# Patient Record
Sex: FEMALE | Race: WHITE | Employment: FULL TIME | URBAN - NONMETROPOLITAN AREA
[De-identification: names, ages, dates, MRNs, and addresses within clinical notes are randomized per-mention and may not be internally consistent; named-entity substitution may affect disease eponyms.]

---

## 2018-05-20 ENCOUNTER — HOSPITAL ENCOUNTER (EMERGENCY)
Dept: GENERAL RADIOLOGY | Age: 43
Discharge: HOME | End: 2018-05-20
Payer: COMMERCIAL

## 2018-05-20 ENCOUNTER — HOSPITAL ENCOUNTER (EMERGENCY)
Age: 43
Discharge: HOME | End: 2018-05-20
Payer: COMMERCIAL

## 2018-05-20 VITALS
DIASTOLIC BLOOD PRESSURE: 83 MMHG | SYSTOLIC BLOOD PRESSURE: 174 MMHG | RESPIRATION RATE: 16 BRPM | HEART RATE: 90 BPM | TEMPERATURE: 98.5 F | BODY MASS INDEX: 43.4 KG/M2 | OXYGEN SATURATION: 97 % | HEIGHT: 69 IN | WEIGHT: 293 LBS

## 2018-05-20 DIAGNOSIS — S90.121A CONTUSION OF TOE OF RIGHT FOOT, INITIAL ENCOUNTER: Primary | ICD-10-CM

## 2018-05-20 PROCEDURE — 99213 OFFICE O/P EST LOW 20 MIN: CPT | Performed by: NURSE PRACTITIONER

## 2018-05-20 PROCEDURE — 73630 X-RAY EXAM OF FOOT: CPT

## 2018-05-20 PROCEDURE — 99213 OFFICE O/P EST LOW 20 MIN: CPT

## 2018-05-20 ASSESSMENT — ENCOUNTER SYMPTOMS
ALLERGIC/IMMUNOLOGIC NEGATIVE: 1
EYE ITCHING: 0
SINUS PRESSURE: 0
COUGH: 0
EYE PAIN: 0
SINUS PAIN: 0
GASTROINTESTINAL NEGATIVE: 1

## 2018-05-20 ASSESSMENT — PAIN DESCRIPTION - LOCATION: LOCATION: FOOT

## 2018-05-20 ASSESSMENT — PAIN DESCRIPTION - ORIENTATION: ORIENTATION: RIGHT

## 2018-05-20 ASSESSMENT — PAIN SCALES - GENERAL: PAINLEVEL_OUTOF10: 8

## 2018-05-20 ASSESSMENT — PAIN DESCRIPTION - DESCRIPTORS: DESCRIPTORS: ACHING

## 2018-05-20 ASSESSMENT — PAIN DESCRIPTION - PAIN TYPE: TYPE: ACUTE PAIN

## 2018-07-02 ENCOUNTER — NURSE TRIAGE (OUTPATIENT)
Dept: ADMINISTRATIVE | Age: 43
End: 2018-07-02

## 2018-07-03 NOTE — TELEPHONE ENCOUNTER
Katarina Chavarria had a migraine today and her throat got really sore and swollen due to allergies. Kalanimarcelle Gates is going to go be seen but isn't sure if she should go to the urgent care or ER?  Please advise.         No answer, left message

## 2018-10-12 ENCOUNTER — OFFICE VISIT (OUTPATIENT)
Dept: FAMILY MEDICINE CLINIC | Age: 43
End: 2018-10-12
Payer: COMMERCIAL

## 2018-10-12 VITALS
TEMPERATURE: 97.1 F | HEART RATE: 98 BPM | OXYGEN SATURATION: 97 % | DIASTOLIC BLOOD PRESSURE: 80 MMHG | BODY MASS INDEX: 59.37 KG/M2 | RESPIRATION RATE: 18 BRPM | WEIGHT: 293 LBS | SYSTOLIC BLOOD PRESSURE: 142 MMHG

## 2018-10-12 DIAGNOSIS — M26.622 ARTHRALGIA OF LEFT TEMPOROMANDIBULAR JOINT: Primary | ICD-10-CM

## 2018-10-12 DIAGNOSIS — K08.89 PAIN, DENTAL: ICD-10-CM

## 2018-10-12 PROCEDURE — 99213 OFFICE O/P EST LOW 20 MIN: CPT | Performed by: NURSE PRACTITIONER

## 2018-10-12 RX ORDER — CYCLOBENZAPRINE HCL 10 MG
10 TABLET ORAL 3 TIMES DAILY PRN
Qty: 8 TABLET | Refills: 0 | Status: SHIPPED | OUTPATIENT
Start: 2018-10-12 | End: 2018-11-05

## 2018-10-12 ASSESSMENT — PATIENT HEALTH QUESTIONNAIRE - PHQ9
1. LITTLE INTEREST OR PLEASURE IN DOING THINGS: 0
2. FEELING DOWN, DEPRESSED OR HOPELESS: 0
SUM OF ALL RESPONSES TO PHQ QUESTIONS 1-9: 0
SUM OF ALL RESPONSES TO PHQ9 QUESTIONS 1 & 2: 0
SUM OF ALL RESPONSES TO PHQ QUESTIONS 1-9: 0

## 2018-10-12 ASSESSMENT — ENCOUNTER SYMPTOMS
SHORTNESS OF BREATH: 0
ABDOMINAL PAIN: 0
FACIAL SWELLING: 0

## 2018-10-12 NOTE — PROGRESS NOTES
gastrointestinal endoscopy (2012, 8/2/16). CURRENT MEDICATIONS       Outpatient Medications Prior to Visit   Medication Sig Dispense Refill    sodium chloride (SALINE MIST) 0.65 % nasal spray 1 spray by Nasal route as needed for Congestion 1 Bottle 0    loratadine-pseudoephedrine (LORATADINE-D 12HR) 5-120 MG per extended release tablet Take 1 tablet by mouth 2 times daily 30 tablet 0    ibuprofen (ADVIL;MOTRIN) 200 MG tablet Take 400 mg by mouth every 6 hours as needed for Pain       Lactobacillus (PROBIOTIC ACIDOPHILUS PO) Take by mouth      HYDROcodone-acetaminophen (NORCO) 5-325 MG per tablet Take 1 tablet by mouth every 6 hours as needed for Pain      topiramate (TOPAMAX) 200 MG tablet Take 200 mg by mouth daily       omeprazole (PRILOSEC) 20 MG capsule Take 40 mg by mouth daily.  albuterol (PROVENTIL HFA;VENTOLIN HFA) 108 (90 BASE) MCG/ACT inhaler Inhale 2 puffs into the lungs every 6 hours as needed for Wheezing.  citalopram (CELEXA) 20 MG tablet Take 20 mg by mouth daily.  norgestimate-ethinyl estradiol (SPRINTEC 28) 0.25-35 MG-MCG per tablet Take 1 tablet by mouth daily.  naproxen (NAPROSYN) 500 MG tablet Take 1 tablet by mouth 2 times daily (with meals) for 30 doses 30 tablet 0     No facility-administered medications prior to visit. ALLERGIES     Patient is is allergic to morphine; bactrim [sulfamethoxazole-trimethoprim]; ceclor [cefaclor]; and vancomycin.     FAMILY HISTORY     Patient's family history includes Arthritis in her maternal grandmother, mother, and paternal grandfather; Asthma in her mother; Cancer in her maternal grandfather, paternal aunt, paternal grandfather, and paternal grandmother; Depression in her maternal grandmother; Diabetes in her paternal grandfather; Hearing Loss in her maternal grandmother; Heart Disease in her maternal grandmother; High Blood Pressure in her father and maternal grandmother; High Cholesterol in her maternal grandmother;

## 2018-10-12 NOTE — PATIENT INSTRUCTIONS
Patient Education        Temporomandibular Disorder: Care Instructions  Your Care Instructions    Temporomandibular (TM) disorders are a problem with the muscles and joints that connect your jaw to your skull. They cause pain when you open your mouth, chew, or yawn. You may feel this pain on one or both sides. TM disorders are often caused by tight jaw muscles. The tightness can be caused by clenching or grinding your teeth. This may happen when you have a lot of stress in your life. If you lower your stress, you may be able to stop clenching or grinding your teeth. This will help relax your jaw and reduce your pain. You may also be able to do some things at home to feel better. But if none of this works, your doctor may prescribe medicine to help relax your muscles and control the pain. Follow-up care is a key part of your treatment and safety. Be sure to make and go to all appointments, and call your doctor if you are having problems. It's also a good idea to know your test results and keep a list of the medicines you take. How can you care for yourself at home? · Put a warm, moist cloth or heating pad set on low on your jaw. Do this for 10 to 20 minutes at a time. Put a thin cloth between the heating pad and your skin. · Avoid hard or chewy foods that cause your jaws to work very hard. Examples include popcorn, jerky, tough meats, chewy breads, gum, and raw apples and carrots. · Choose softer foods that are easy to chew. These include eggs, yogurt, and soup. · Cut your food into small pieces. Chew slowly. · If your jaw gets too painful to chew, or if it locks, you may need to puree your food for a few days or weeks. · To relax your jaw, repeat this exercise for a few minutes every morning and evening. Watch yourself in a mirror. Gently open and close your mouth. Move your jaw straight up and down. But don't do this if it makes your pain worse.   · Get at least 30 minutes of exercise on most days of

## 2018-11-05 ENCOUNTER — OFFICE VISIT (OUTPATIENT)
Dept: FAMILY MEDICINE CLINIC | Age: 43
End: 2018-11-05
Payer: COMMERCIAL

## 2018-11-05 VITALS
TEMPERATURE: 98.5 F | SYSTOLIC BLOOD PRESSURE: 122 MMHG | HEART RATE: 83 BPM | DIASTOLIC BLOOD PRESSURE: 84 MMHG | WEIGHT: 293 LBS | OXYGEN SATURATION: 98 % | RESPIRATION RATE: 18 BRPM | HEIGHT: 69 IN | BODY MASS INDEX: 43.4 KG/M2

## 2018-11-05 DIAGNOSIS — M79.604 RIGHT LEG PAIN: ICD-10-CM
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
## 2018-11-05 DIAGNOSIS — S80.01XA CONTUSION OF RIGHT KNEE AND LOWER LEG, INITIAL ENCOUNTER: ICD-10-CM

## 2018-11-05 DIAGNOSIS — S80.11XA CONTUSION OF RIGHT KNEE AND LOWER LEG, INITIAL ENCOUNTER: ICD-10-CM

## 2018-11-05 PROCEDURE — 99213 OFFICE O/P EST LOW 20 MIN: CPT | Performed by: NURSE PRACTITIONER

## 2018-11-05 RX ORDER — OMEPRAZOLE 40 MG/1
40 CAPSULE, DELAYED RELEASE ORAL DAILY
COMMUNITY

## 2018-11-05 RX ORDER — CALCIUM CARBONATE 300MG(750)
TABLET,CHEWABLE ORAL DAILY
COMMUNITY

## 2018-11-05 RX ORDER — IBUPROFEN 200 MG
200 TABLET ORAL EVERY 6 HOURS PRN
COMMUNITY
End: 2021-10-08 | Stop reason: ALTCHOICE

## 2018-11-05 ASSESSMENT — ENCOUNTER SYMPTOMS
VOMITING: 0
CHOKING: 0
SHORTNESS OF BREATH: 0
ABDOMINAL PAIN: 0
NAUSEA: 0

## 2018-11-05 NOTE — PATIENT INSTRUCTIONS
Patient Education        Contusion: Care Instructions  Your Care Instructions    Contusion is the medical term for a bruise. It is the result of a direct blow or an impact, such as a fall. Contusions are common sports injuries. Most people think of a bruise as a black-and-blue spot. This happens when small blood vessels get torn and leak blood under the skin. But bones, muscles, and organs can also get bruised. This may damage deep tissues but not cause a bruise you can see. The doctor will do a physical exam to find the location of your contusion. You may also have tests to make sure you do not have a more serious injury, such as a broken bone or nerve damage. These may include X-rays or other imaging tests like a CT scan or MRI. Deep-tissue contusions may cause pain and swelling. But if there is no serious damage, they will often get better in a few weeks with home treatment. The doctor has checked you carefully, but problems can develop later. If you notice any problems or new symptoms, get medical treatment right away. Follow-up care is a key part of your treatment and safety. Be sure to make and go to all appointments, and call your doctor if you are having problems. It's also a good idea to know your test results and keep a list of the medicines you take. How can you care for yourself at home? · Put ice or a cold pack on the sore area for 10 to 20 minutes at a time to stop swelling. Put a thin cloth between the ice pack and your skin. · Be safe with medicines. Read and follow all instructions on the label. ¨ If the doctor gave you a prescription medicine for pain, take it as prescribed. ¨ If you are not taking a prescription pain medicine, ask your doctor if you can take an over-the-counter medicine. · If you can, prop up the sore area on pillows as much as possible for the next few days. Try to keep the sore area above the level of your heart. When should you call for help?   Call your doctor now or cold pack and your skin. The cloth protects the injured area from getting too cold. · Use ice for 10 to 15 minutes at a time for the first 48 to 72 hours. Compression  · Use compression for sprains, strains, and surgeries of the arms and legs. · Wrap the injured area with an elastic bandage or compression sleeve to reduce swelling. · Don't wrap it too tightly. If the area below it feels numb, tingles, or feels cool, loosen the wrap. Elevation  · Use elevation for areas of the body that can be propped up, such as arms and legs. · Prop up the injured area on pillows whenever you use ice. Keep it propped up anytime you sit or lie down. · Try to keep the injured area at or above the level of your heart. This will help reduce swelling and bruising. Where can you learn more? Go to https://Minicabstertyrone.vogogo. org and sign in to your [address] account. Enter A124 in the Smart Gardener box to learn more about \"Learning About RICE (Rest, Ice, Compression, and Elevation). \"     If you do not have an account, please click on the \"Sign Up Now\" link. Current as of: November 29, 2017  Content Version: 11.7  © 6263-0446 iPerceptions, Incorporated. Care instructions adapted under license by [facility] ([facility]). If you have questions about a medical condition or this instruction, always ask your healthcare professional. [place] [address] any warranty or liability for your use of this information.

## 2018-11-05 NOTE — PROGRESS NOTES
grandfather; Asthma in her mother; Cancer in her maternal grandfather, paternal aunt, paternal grandfather, and paternal grandmother; Depression in her maternal grandmother; Diabetes in her paternal grandfather; Hearing Loss in her maternal grandmother; Heart Disease in her maternal grandmother; High Blood Pressure in her father and maternal grandmother; High Cholesterol in her maternal grandmother; Kidney Disease in her maternal grandmother; [employer]ian Arms / [place] [employer] in her maternal cousin. SOCIAL HISTORY     Patient  reports that she quit smoking about 2 years ago. Her smoking use included Cigarettes. She has a 1.00 pack-year smoking history. She has never used smokeless tobacco. She reports that she drinks alcohol. She reports that she does not use drugs. PHYSICAL EXAM     VITALS  BP: 122/84, Temp: 98.5 °F (36.9 °C), Pulse: 83, Resp: 18, SpO2: 98 %  Physical Exam   Constitutional: She is oriented to person, place, and time. She appears well-developed and well-nourished. No distress. Pulmonary/Chest: Effort normal. No respiratory distress. Musculoskeletal:        Right knee: She exhibits ecchymosis (mild to the medial aspect). She exhibits normal range of motion, no swelling, no effusion, no deformity and no bony tenderness. No tenderness found. Right lower leg: She exhibits tenderness and bony tenderness (lower lateral aspect). She exhibits no swelling and no edema. 2+ pedal and posttibial pulse noted in the right lower extremity as well as adequate capillary refill. Neurological: She is alert and oriented to person, place, and time. No cranial nerve deficit. Skin: Skin is warm and dry. No rash noted. She is not diaphoretic. Psychiatric: She has a normal mood and affect. Nursing note and vitals reviewed. DIAGNOSTIC RESULTS   Labs:No results found for this visit on 11/05/18.     IMAGING:  No orders to display       No images are attached to the encounter or orders placed in the

## 2018-12-28 ENCOUNTER — OFFICE VISIT (OUTPATIENT)
Dept: FAMILY MEDICINE CLINIC | Age: 43
End: 2018-12-28
Payer: COMMERCIAL

## 2018-12-28 VITALS
TEMPERATURE: 98.1 F | DIASTOLIC BLOOD PRESSURE: 80 MMHG | WEIGHT: 293 LBS | HEART RATE: 98 BPM | RESPIRATION RATE: 26 BRPM | BODY MASS INDEX: 58.63 KG/M2 | SYSTOLIC BLOOD PRESSURE: 138 MMHG | OXYGEN SATURATION: 98 %

## 2018-12-28 DIAGNOSIS — J20.9 ACUTE BRONCHITIS DUE TO INFECTION: Primary | ICD-10-CM

## 2018-12-28 DIAGNOSIS — R06.2 WHEEZING ON AUSCULTATION: ICD-10-CM

## 2018-12-28 PROCEDURE — 99213 OFFICE O/P EST LOW 20 MIN: CPT | Performed by: NURSE PRACTITIONER

## 2018-12-28 RX ORDER — METHYLPREDNISOLONE 4 MG/1
TABLET ORAL
Qty: 1 KIT | Refills: 0 | Status: SHIPPED | OUTPATIENT
Start: 2018-12-28 | End: 2019-08-07

## 2018-12-28 RX ORDER — ALBUTEROL SULFATE 90 UG/1
2 AEROSOL, METERED RESPIRATORY (INHALATION) EVERY 4 HOURS PRN
Qty: 3 INHALER | Refills: 1 | Status: SHIPPED | OUTPATIENT
Start: 2018-12-28

## 2018-12-28 RX ORDER — AZITHROMYCIN 250 MG/1
TABLET, FILM COATED ORAL
Qty: 1 PACKET | Refills: 0 | Status: SHIPPED | OUTPATIENT
Start: 2018-12-28 | End: 2019-08-07

## 2018-12-28 ASSESSMENT — ENCOUNTER SYMPTOMS
SORE THROAT: 0
PHOTOPHOBIA: 0
SHORTNESS OF BREATH: 1
VOMITING: 0
RHINORRHEA: 0
ABDOMINAL PAIN: 0
COUGH: 1
BACK PAIN: 0
WHEEZING: 1
CONSTIPATION: 0
APNEA: 0
NAUSEA: 0
SINUS PRESSURE: 0
DIARRHEA: 0

## 2018-12-28 NOTE — PROGRESS NOTES
Exam   Constitutional: She is oriented to person, place, and time. She appears well-developed and well-nourished. HENT:   Head: Normocephalic and atraumatic. Right Ear: Hearing, tympanic membrane, external ear and ear canal normal.   Left Ear: Hearing, tympanic membrane, external ear and ear canal normal.   Mouth/Throat: No oropharyngeal exudate. Eyes: Pupils are equal, round, and reactive to light. Neck: Normal range of motion. Neck supple. Cardiovascular: Normal rate, regular rhythm and normal heart sounds. Pulmonary/Chest: No respiratory distress. She has wheezes. She has rhonchi in the right upper field and the left upper field. Abdominal: Soft. Bowel sounds are normal. She exhibits no distension. There is no tenderness. Musculoskeletal: Normal range of motion. Lymphadenopathy:     She has no cervical adenopathy. Neurological: She is alert and oriented to person, place, and time. Skin: Skin is warm and dry. Nursing note and vitals reviewed. DIAGNOSTIC RESULTS   Labs:No results found for this visit on 12/28/18. IMAGING:  XR CHEST STANDARD (2 VW)   Final Result      No acute intrathoracic process. **This report has been created using voice recognition software. It may contain minor errors which are inherent in voice recognition technology. **      Final report electronically signed by Dr. Matteo Loaiza on 12/28/2018 10:58 AM          No images are attached to the encounter or orders placed in the encounter. CLINICAL COURSE COURSE:     Vitals:    12/28/18 1019   BP: 138/80   Site: Left Upper Arm   Pulse: 98   Resp: 26   Temp: 98.1 °F (36.7 °C)   TempSrc: Temporal   SpO2: 98%   Weight: (!) 397 lb (180.1 kg)         PROCEDURES:  None  FINAL IMPRESSION      1. Acute bronchitis due to infection    2. Wheezing on auscultation         DISPOSITION/PLAN     Patient has wheezing on auscultation as well as rhonchi in upper lobes. Chest xray is negative for pneumonia.   Will begin treatment for acute bronchitis - likely due to infection at this length in time. Patient in stable condition. Medications sent to pharmacy. All questions answered. Discharge instructions given by staff. PATIENT REFERRED TO:    Follow up with PCP as needed.     DISCHARGE MEDICATIONS:  New Prescriptions    ALBUTEROL SULFATE  (90 BASE) MCG/ACT INHALER    Inhale 2 puffs into the lungs every 4 hours as needed for Wheezing or Shortness of Breath    AZITHROMYCIN (ZITHROMAX Z-PEDRO) 250 MG TABLET    Take 2 tablets day 1 Take 1 tablet days 2-5    METHYLPREDNISOLONE (MEDROL DOSEPACK) 4 MG TABLET    Take by mouth as directed         Electronically signed by SHANELL Deal CNP on 12/28/2018 at 11:40 AM

## 2019-08-07 ENCOUNTER — OFFICE VISIT (OUTPATIENT)
Dept: FAMILY MEDICINE CLINIC | Age: 44
End: 2019-08-07
Payer: COMMERCIAL

## 2019-08-07 VITALS
SYSTOLIC BLOOD PRESSURE: 120 MMHG | RESPIRATION RATE: 20 BRPM | HEART RATE: 97 BPM | WEIGHT: 293 LBS | TEMPERATURE: 98.4 F | OXYGEN SATURATION: 97 % | BODY MASS INDEX: 43.4 KG/M2 | HEIGHT: 69 IN | DIASTOLIC BLOOD PRESSURE: 84 MMHG

## 2019-08-07 DIAGNOSIS — J01.90 ACUTE SINUSITIS, RECURRENCE NOT SPECIFIED, UNSPECIFIED LOCATION: Primary | ICD-10-CM

## 2019-08-07 DIAGNOSIS — J02.9 SORE THROAT: ICD-10-CM

## 2019-08-07 LAB — STREPTOCOCCUS A RNA: NEGATIVE

## 2019-08-07 PROCEDURE — 1036F TOBACCO NON-USER: CPT | Performed by: NURSE PRACTITIONER

## 2019-08-07 PROCEDURE — G8427 DOCREV CUR MEDS BY ELIG CLIN: HCPCS | Performed by: NURSE PRACTITIONER

## 2019-08-07 PROCEDURE — 87651 STREP A DNA AMP PROBE: CPT | Performed by: NURSE PRACTITIONER

## 2019-08-07 PROCEDURE — 99213 OFFICE O/P EST LOW 20 MIN: CPT | Performed by: NURSE PRACTITIONER

## 2019-08-07 PROCEDURE — G8417 CALC BMI ABV UP PARAM F/U: HCPCS | Performed by: NURSE PRACTITIONER

## 2019-08-07 RX ORDER — PREDNISONE 20 MG/1
20 TABLET ORAL 2 TIMES DAILY
Qty: 10 TABLET | Refills: 0 | Status: SHIPPED | OUTPATIENT
Start: 2019-08-07 | End: 2019-08-12

## 2019-08-07 RX ORDER — PREDNISONE 10 MG/1
10 TABLET ORAL DAILY
COMMUNITY
End: 2019-08-07 | Stop reason: ALTCHOICE

## 2019-08-07 RX ORDER — DOXYCYCLINE HYCLATE 100 MG
100 TABLET ORAL 2 TIMES DAILY
Qty: 14 TABLET | Refills: 0 | Status: SHIPPED | OUTPATIENT
Start: 2019-08-07 | End: 2019-08-14

## 2019-08-07 ASSESSMENT — ENCOUNTER SYMPTOMS
STRIDOR: 0
SINUS PAIN: 1
NAUSEA: 0
SWOLLEN GLANDS: 1
WHEEZING: 0
COLOR CHANGE: 0
SINUS PRESSURE: 0
COUGH: 1
VOMITING: 0
VOICE CHANGE: 0
TROUBLE SWALLOWING: 0
BACK PAIN: 0
CHEST TIGHTNESS: 0
SHORTNESS OF BREATH: 0

## 2019-08-07 ASSESSMENT — PATIENT HEALTH QUESTIONNAIRE - PHQ9
SUM OF ALL RESPONSES TO PHQ QUESTIONS 1-9: 0
2. FEELING DOWN, DEPRESSED OR HOPELESS: 0
SUM OF ALL RESPONSES TO PHQ QUESTIONS 1-9: 0
SUM OF ALL RESPONSES TO PHQ9 QUESTIONS 1 & 2: 0
1. LITTLE INTEREST OR PLEASURE IN DOING THINGS: 0

## 2019-08-07 NOTE — PATIENT INSTRUCTIONS
hot, wet towel or a warm gel pack on your face 3 or 4 times a day for 5 to 10 minutes each time. · Try a decongestant nasal spray like oxymetazoline (Afrin). Do not use it for more than 3 days in a row. Using it for more than 3 days can make your congestion worse. When should you call for help? Call your doctor now or seek immediate medical care if:    · You have new or worse swelling or redness in your face or around your eyes.     · You have a new or higher fever.    Watch closely for changes in your health, and be sure to contact your doctor if:    · You have new or worse facial pain.     · The mucus from your nose becomes thicker (like pus) or has new blood in it.     · You are not getting better as expected. Where can you learn more? Go to https://Mc Kinney LocksmithpejanetSGBeb.Peap.co. org and sign in to your IdealSeat account. Enter O786 in the QuoVadis box to learn more about \"Sinusitis: Care Instructions. \"     If you do not have an account, please click on the \"Sign Up Now\" link. Current as of: October 21, 2018  Content Version: 12.0  © 4018-2243 Healthwise, Incorporated. Care instructions adapted under license by [facility] ([facility]). If you have questions about a medical condition or this instruction, always ask your healthcare professional. [address] any warranty or liability for your use of this information.

## 2019-08-12 ASSESSMENT — ENCOUNTER SYMPTOMS
CONSTIPATION: 0
EYE PAIN: 0
EYE DISCHARGE: 0
SORE THROAT: 1
EYE REDNESS: 0

## 2021-02-10 ENCOUNTER — OFFICE VISIT (OUTPATIENT)
Dept: FAMILY MEDICINE CLINIC | Age: 46
End: 2021-02-10

## 2021-02-10 VITALS
OXYGEN SATURATION: 99 % | RESPIRATION RATE: 18 BRPM | SYSTOLIC BLOOD PRESSURE: 136 MMHG | DIASTOLIC BLOOD PRESSURE: 74 MMHG | HEART RATE: 90 BPM | TEMPERATURE: 97.1 F | BODY MASS INDEX: 58.18 KG/M2 | WEIGHT: 293 LBS

## 2021-02-10 DIAGNOSIS — M25.511 ACUTE PAIN OF RIGHT SHOULDER: Primary | ICD-10-CM

## 2021-02-10 PROCEDURE — 1036F TOBACCO NON-USER: CPT | Performed by: NURSE PRACTITIONER

## 2021-02-10 PROCEDURE — 99214 OFFICE O/P EST MOD 30 MIN: CPT | Performed by: NURSE PRACTITIONER

## 2021-02-10 PROCEDURE — G8484 FLU IMMUNIZE NO ADMIN: HCPCS | Performed by: NURSE PRACTITIONER

## 2021-02-10 PROCEDURE — G8427 DOCREV CUR MEDS BY ELIG CLIN: HCPCS | Performed by: NURSE PRACTITIONER

## 2021-02-10 PROCEDURE — G8417 CALC BMI ABV UP PARAM F/U: HCPCS | Performed by: NURSE PRACTITIONER

## 2021-02-10 RX ORDER — KETOROLAC TROMETHAMINE 30 MG/ML
60 INJECTION, SOLUTION INTRAMUSCULAR; INTRAVENOUS ONCE
Status: COMPLETED | OUTPATIENT
Start: 2021-02-10 | End: 2021-02-10

## 2021-02-10 ASSESSMENT — PATIENT HEALTH QUESTIONNAIRE - PHQ9
SUM OF ALL RESPONSES TO PHQ9 QUESTIONS 1 & 2: 0
SUM OF ALL RESPONSES TO PHQ QUESTIONS 1-9: 0
SUM OF ALL RESPONSES TO PHQ QUESTIONS 1-9: 0

## 2021-02-10 NOTE — PATIENT INSTRUCTIONS
Patient Education        Learning About RICE (Rest, Ice, Compression, and Elevation)  What is RICE? RICE is a way to care for an injury. RICE helps relieve pain and swelling. It may also help with healing and flexibility. RICE stands for:  · R est and protect the injured or sore area. · I ce or a cold pack used as soon as possible. · C ompression, or wrapping the injured or sore area with an elastic bandage. · E levation (propping up) the injured or sore area. How do you do RICE? You can use RICE for home treatment when you have general aches and pains or after an injury or surgery. Rest  · Do not put weight on the injury for at least 24 to 48 hours. · Use crutches for a badly sprained knee or ankle. · Support a sprained wrist, elbow, or shoulder with a sling. Ice  · Put ice or a cold pack on the injury right away to reduce pain and swelling. Frozen vegetables will also work as an ice pack. Put a thin cloth between the ice or cold pack and your skin. The cloth protects the injured area from getting too cold. · Use ice for 10 to 15 minutes at a time for the first 48 to 72 hours. Compression  · Use compression for sprains, strains, and surgeries of the arms and legs. · Wrap the injured area with an elastic bandage or compression sleeve to reduce swelling. · Don't wrap it too tightly. If the area below it feels numb, tingles, or feels cool, loosen the wrap. Elevation  · Use elevation for areas of the body that can be propped up, such as arms and legs. · Prop up the injured area on pillows whenever you use ice. Keep it propped up anytime you sit or lie down. · Try to keep the injured area at or above the level of your heart. This will help reduce swelling and bruising. Where can you learn more? Go to https://chpepiceweb.Tripwolf. org and sign in to your vBrand account. Enter E019 in the Sykio box to learn more about \"Learning About RICE (Rest, Ice, Compression, and Elevation). \"     If you do not have an account, please click on the \"Sign Up Now\" link. Current as of: March 2, 2020               Content Version: 12.6  © 2006-2020 Intent. Care instructions adapted under license by [facility] ([facility]). If you have questions about a medical condition or this instruction, always ask your healthcare professional. [place] [address] any warranty or liability for your use of this information. Patient Education        Shoulder Pain: Care Instructions  Your Care Instructions     You can hurt your shoulder by using it too much during an activity, such as fishing or baseball. It can also happen as part of the everyday wear and tear of getting older. Shoulder injuries can be slow to heal, but your shoulder should get better with time. Your doctor may recommend a sling to rest your shoulder. If you have injured your shoulder, you may need testing and treatment. Follow-up care is a key part of your treatment and safety. Be sure to make and go to all appointments, and call your doctor if you are having problems. It's also a good idea to know your test results and keep a list of the medicines you take. How can you care for yourself at home? · Take pain medicines exactly as directed. ? If the doctor gave you a prescription medicine for pain, take it as prescribed. ? If you are not taking a prescription pain medicine, ask your doctor if you can take an over-the-counter medicine. ? Do not take two or more pain medicines at the same time unless the doctor told you to. Many pain medicines contain acetaminophen, which is Tylenol. Too much acetaminophen (Tylenol) can be harmful. · If your doctor recommends that you wear a sling, use it as directed. Do not take it off before your doctor tells you to. · Put ice or a cold pack on the sore area for 10 to 20 minutes at a time. Put a thin cloth between the ice and your skin. · If there is no swelling, you can put moist heat, a heating pad, or a warm cloth on your shoulder. Some doctors suggest alternating between hot and cold. · Rest your shoulder for a few days. If your doctor recommends it, you can then begin gentle exercise of the shoulder, but do not lift anything heavy. When should you call for help? Call 911 anytime you think you may need emergency care. For example, call if:    · You have chest pain or pressure. This may occur with:  ? Sweating. ? Shortness of breath. ? Nausea or vomiting. ? Pain that spreads from the chest to the neck, jaw, or one or both shoulders or arms. ? Dizziness or lightheadedness. ? A fast or uneven pulse. After calling 911, chew 1 adult-strength aspirin. Wait for an ambulance. Do not try to drive yourself.     · Your arm or hand is cool or pale or changes color. Call your doctor now or seek immediate medical care if:    · You have signs of infection, such as:  ? Increased pain, swelling, warmth, or redness in your shoulder. ? Red streaks leading from a place on your shoulder. ? Pus draining from an area of your shoulder. ? Swollen lymph nodes in your neck, armpits, or groin. ? A fever. Watch closely for changes in your health, and be sure to contact your doctor if:    · You cannot use your shoulder.     · Your shoulder does not get better as expected. Where can you learn more? Go to https://Modulus Financial Engineeringtyrone.snapp.me. org and sign in to your Z[employer]ant account. Enter C519 in the Innovari box to learn more about \"Shoulder Pain: Care Instructions. \"     If you do not have an account, please click on the \"Sign Up Now\" link. Current as of: March 2, 2020               Content Version: 12.6  © 7958-8967 Ciespace, Incorporated. Care instructions adapted under license by [facility] ([facility]). If you have questions about a medical condition or this instruction, always ask your healthcare professional. [address] any warranty or liability for your use of this information. Yes

## 2021-02-10 NOTE — PROGRESS NOTES
2/10/2021     Aniyah Butcher (:  1975) is a 41 y.o. female, here for evaluation of the following medical concerns:    Here with complaints of shoulder pain   Was taking the trash out last night, swinging motion with a heavy bag and had pain instantly     Cannot lift or type     Shoulder Pain   The pain is present in the right shoulder. This is a new problem. The current episode started yesterday. There has been a history of trauma. The problem occurs constantly. The problem has been gradually worsening. The quality of the pain is described as aching. The pain is at a severity of 9/10. The pain is severe. Associated symptoms include a limited range of motion and numbness (right hand ). Pertinent negatives include no fever. The symptoms are aggravated by activity. She has tried acetaminophen, NSAIDS, heat and cold (biofreeze) for the symptoms. The treatment provided no relief. Review of Systems   Constitutional: Negative for chills, fatigue and fever. Respiratory: Negative for cough and shortness of breath. Cardiovascular: Negative for chest pain and leg swelling. Gastrointestinal: Negative for abdominal distention, constipation, diarrhea, nausea and rectal pain. Musculoskeletal: Positive for arthralgias. Right shoulder pain    Skin: Negative for color change, rash and wound. Neurological: Positive for numbness (right hand ). Prior to Visit Medications    Medication Sig Taking?  Authorizing Provider   albuterol sulfate  (90 Base) MCG/ACT inhaler Inhale 2 puffs into the lungs every 4 hours as needed for Wheezing or Shortness of Breath  Lilibeth Wang, APRN - CNP   omeprazole (PRILOSEC) 40 MG delayed release capsule Take 40 mg by mouth daily  Historical Provider, MD   Magnesium 400 MG TABS Take by mouth  Historical Provider, MD   ibuprofen (ADVIL;MOTRIN) 200 MG tablet Take 200 mg by mouth every 6 hours as needed for Pain  Historical Provider, MD topiramate (TOPAMAX) 200 MG tablet Take 200 mg by mouth daily   Historical Provider, MD   citalopram (CELEXA) 20 MG tablet Take 20 mg by mouth daily. Historical Provider, MD   norgestimate-ethinyl estradiol ([address] [address]) 0.25-35 MG-MCG per tablet Take 1 tablet by mouth daily. Historical Provider, MD        Social History     Tobacco Use    Smoking status: Former Smoker     Packs/day: 1.00     Years: 1.00     Pack years: 1.00     Types: Cigarettes     Quit date: 2015     Years since quittin.1    Smokeless tobacco: Never Used   Substance Use Topics    Alcohol use: Yes     Comment: rare        Vitals:    02/10/21 1358   BP: 136/74   Pulse: 90   Resp: 18   Temp: 97.1 °F (36.2 °C)   SpO2: 99%   Weight: (!) 394 lb (178.7 kg)     Estimated body mass index is 58.18 kg/m² as calculated from the following:    Height as of 19: 5' 9\" (1.753 m). Weight as of this encounter: 394 lb (178.7 kg). Physical Exam  Vitals signs reviewed. Constitutional:       General: She is not in acute distress. Appearance: Normal appearance. She is normal weight. She is not ill-appearing. HENT:      Head: Normocephalic and atraumatic. Right Ear: External ear normal.      Left Ear: External ear normal.      Mouth/Throat:      Mouth: Mucous membranes are moist.   Eyes:      General:         Right eye: No discharge. Conjunctiva/sclera: Conjunctivae normal.   Pulmonary:      Effort: Pulmonary effort is normal.   Musculoskeletal:      Right shoulder: She exhibits tenderness and pain. She exhibits no swelling, no effusion, no crepitus, no deformity, no spasm, normal pulse and normal strength. Skin:     Capillary Refill: Capillary refill takes less than 2 seconds. Neurological:      Mental Status: She is alert and oriented to person, place, and time. Psychiatric:         Mood and Affect: Mood normal.           ASSESSMENT/PLAN:    1. Acute pain of right shoulder  XR No acute fracture or dislocation. toradol given for pain. Arm placed in a sling for comfort. Recommend RICE therapy. F/U in 1 week, may need ORTHO referral.   - XR SHOULDER RIGHT (MIN 2 VIEWS); Future  - ketorolac (TORADOL) injection 60 mg  - XR SHOULDER RIGHT (MIN 2 VIEWS)  - DJO Ultrasling IV Shoulder Sling        Return in about 1 week (around 2/17/2021), or if symptoms worsen or fail to improve. An electronic signature was used to authenticate this note.     --SHANELL Wood - CNP on 2/22/2021 at 10:27 AM

## 2021-02-22 ASSESSMENT — ENCOUNTER SYMPTOMS
COLOR CHANGE: 0
RECTAL PAIN: 0
CONSTIPATION: 0
ABDOMINAL DISTENTION: 0
DIARRHEA: 0
SHORTNESS OF BREATH: 0
NAUSEA: 0
COUGH: 0

## 2021-02-26 ENCOUNTER — TELEPHONE (OUTPATIENT)
Dept: FAMILY MEDICINE CLINIC | Age: 46
End: 2021-02-26

## 2021-02-26 DIAGNOSIS — M25.511 ACUTE PAIN OF RIGHT SHOULDER: Primary | ICD-10-CM

## 2021-02-26 NOTE — TELEPHONE ENCOUNTER
Patient was seen 02/10/21 and stated her arm is not any better. She is requesting a referral to [facility].  Please advise

## 2021-05-19 RX ORDER — LORATADINE 10 MG/1
10 TABLET ORAL DAILY PRN
COMMUNITY

## 2021-05-19 NOTE — PROGRESS NOTES
Following instructions given to patient, who states understanding:    NPO after midnight  [employer] and 's license  Wear comfortable clean clothing  Do not bring jewelry   Shower night before and morning of surgery with a liquid antibacterial soap  Bring medications in original bottles  Follow all instructions given by your physician   needed at discharge  Call [phone] for any questions  Report to SDS on 2nd floor  If you would become ill prior to surgery, please call the surgeon  May have a visitor with you, we request that you limit to 2 visitors in pre-op area  Please bring and wear mask  You will be receiving a phone call one or two days before surgery to review covid screening exposure

## 2021-05-19 NOTE — PROGRESS NOTES
In preparation for their surgical procedure above patient was screened for Obstructive Sleep Apnea (CRUZ) using the STOP-Bang Questionnaire by the Pre-Admission Testing department. This is a pre-surgical screening tool for patient safety and serves as a recommendation, this WILL NOT cause cancellation of surgery. STOP-Bang Questionnaire  * Do you currently see a pulmonologist?  No     If yes STOP, do not complete. Patient follows with DrRodger     1.  Do you snore loudly (able to be heard in the next room)? Yes    2. Do you often feel tired or sleepy during the daytime? No       3. Has anyone ever told you that you stop breathing during your sleep? No    4. Do you have or are you being treated for high blood pressure? No      5. BMI more than 35? BMI (Calculated): 57.7        Yes    6. Age over 48 years? 41 y.o. No    7. Neck Circumference greater than 17 inches for male or 16 inches for female? Measured           (visits only)            Not Applicable    8. Gender Male? No      TOTAL SCORE: 2    CRUZ - Low Risk : Yes to 0 - 2 questions  CRUZ - Intermediate Risk : Yes to 3 - 4 questions  CRUZ - High Risk : Yes to 5 - 8 questions    Adapted from:   STOP Questionnaire: A Tool to Screen Patients for Obstructive Sleep Apnea   [employer] Garett Zepeda M.B.B.S., Elizabeth Saucedo M.D., Ebenezer Morin. Inessa Greenberg, Ph.D., JAQUAN Bedoya.B.B.S., Nathan Gayle M.Sc., Valerie Uribe M.D., Renetta Booker. CLAY Wolfe.P.C.    Anesthesiology 2008; 766:945-52 Copyright 2008, the [address] of [employer]esthesi[employer], [facility].   ----------------------------------------------------------------------------------------------------------------

## 2021-05-19 NOTE — PROGRESS NOTES
States she had covid vaccine 3/22/21 and 4/12/21  Covid screening questionnaire complete and negative for symptoms or exposure see chart for documentation.   Please bring vaccine card if you have had both covid shots  Please limit your exposure to the public after you have your covid test  Please call your doctor immediately if you develop any symptoms of covid prior to your surgery

## 2021-05-19 NOTE — PROGRESS NOTES
Called Dr. Orlena Dancer office and left message with [facility] requesting copy of EKG and reported abnormal lab results WBC 12.2, , K 3.2

## 2021-05-26 ENCOUNTER — ANESTHESIA EVENT (OUTPATIENT)
Dept: OPERATING ROOM | Age: 46
End: 2021-05-26
Payer: COMMERCIAL

## 2021-05-26 ENCOUNTER — HOSPITAL ENCOUNTER (OUTPATIENT)
Age: 46
Setting detail: OUTPATIENT SURGERY
Discharge: HOME | End: 2021-05-26
Attending: ORTHOPAEDIC SURGERY | Admitting: ORTHOPAEDIC SURGERY
Payer: COMMERCIAL

## 2021-05-26 ENCOUNTER — ANESTHESIA (OUTPATIENT)
Dept: OPERATING ROOM | Age: 46
End: 2021-05-26
Payer: COMMERCIAL

## 2021-05-26 VITALS
RESPIRATION RATE: 16 BRPM | WEIGHT: 293 LBS | TEMPERATURE: 97.7 F | SYSTOLIC BLOOD PRESSURE: 165 MMHG | HEART RATE: 85 BPM | OXYGEN SATURATION: 97 % | HEIGHT: 69 IN | DIASTOLIC BLOOD PRESSURE: 78 MMHG | BODY MASS INDEX: 43.4 KG/M2

## 2021-05-26 LAB — PREGNANCY, URINE: NEGATIVE

## 2021-05-26 PROCEDURE — 2580000003 HC RX 258: Performed by: ORTHOPAEDIC SURGERY

## 2021-05-26 PROCEDURE — 81025 URINE PREGNANCY TEST: CPT

## 2021-05-26 RX ORDER — FENTANYL CITRATE 50 UG/ML
50 INJECTION, SOLUTION INTRAMUSCULAR; INTRAVENOUS EVERY 5 MIN PRN
Status: DISCONTINUED | OUTPATIENT
Start: 2021-05-26 | End: 2021-05-26 | Stop reason: HOSPADM

## 2021-05-26 RX ORDER — SODIUM CHLORIDE 9 MG/ML
INJECTION, SOLUTION INTRAVENOUS CONTINUOUS
Status: DISCONTINUED | OUTPATIENT
Start: 2021-05-26 | End: 2021-05-26 | Stop reason: HOSPADM

## 2021-05-26 RX ORDER — LABETALOL 20 MG/4 ML (5 MG/ML) INTRAVENOUS SYRINGE
10 EVERY 10 MIN PRN
Status: DISCONTINUED | OUTPATIENT
Start: 2021-05-26 | End: 2021-05-26 | Stop reason: HOSPADM

## 2021-05-26 RX ORDER — FENTANYL CITRATE 50 UG/ML
25 INJECTION, SOLUTION INTRAMUSCULAR; INTRAVENOUS EVERY 5 MIN PRN
Status: DISCONTINUED | OUTPATIENT
Start: 2021-05-26 | End: 2021-05-26 | Stop reason: HOSPADM

## 2021-05-26 RX ORDER — PROMETHAZINE HYDROCHLORIDE 25 MG/ML
12.5 INJECTION, SOLUTION INTRAMUSCULAR; INTRAVENOUS
Status: DISCONTINUED | OUTPATIENT
Start: 2021-05-26 | End: 2021-05-26 | Stop reason: HOSPADM

## 2021-05-26 RX ADMIN — SODIUM CHLORIDE: 9 INJECTION, SOLUTION INTRAVENOUS at 12:32

## 2021-05-26 ASSESSMENT — PAIN SCALES - GENERAL: PAINLEVEL_OUTOF10: 6

## 2021-05-26 ASSESSMENT — PAIN DESCRIPTION - PAIN TYPE: TYPE: ACUTE PAIN

## 2021-05-26 NOTE — ANESTHESIA PRE PROCEDURE
Department of Anesthesiology  Preprocedure Note       Name:  Cherie Chambers   Age:  41 y.o.  :  1975                                          MRN:  [id number]         Date:  2021      Surgeon: Mayra Mojica):  Ashley Escobar DO    Procedure: Procedure(s):  RIGHT SHOULDER ARTHROSCOPY, DISTAL CALVICLE EXCISION, SUBACROMIAL DECOMPRESSION, POSS ROTATOR CUFF REPAIR, TENOTOMY VS BICEP TENODESIS    Medications prior to admission:   Prior to Admission medications    Medication Sig Start Date End Date Taking? Authorizing Provider   loratadine (CLARITIN) 10 MG tablet Take 10 mg by mouth daily as needed   Yes Historical Provider, MD   albuterol sulfate  (90 Base) MCG/ACT inhaler Inhale 2 puffs into the lungs every 4 hours as needed for Wheezing or Shortness of Breath 18  Yes Lilibeth Wang APRN - CNP   omeprazole (PRILOSEC) 40 MG delayed release capsule Take 40 mg by mouth daily   Yes Historical Provider, MD   Magnesium 400 MG TABS Take by mouth daily    Yes Historical Provider, MD   ibuprofen (ADVIL;MOTRIN) 200 MG tablet Take 200 mg by mouth every 6 hours as needed for Pain   Yes Historical Provider, MD   topiramate (TOPAMAX) 200 MG tablet Take 200 mg by mouth daily    Yes Historical Provider, MD   citalopram (CELEXA) 20 MG tablet Take 20 mg by mouth daily. Yes Historical Provider, MD   norgestimate-ethinyl estradiol ([address] [address]) 0.25-35 MG-MCG per tablet Take 1 tablet by mouth daily. Yes Historical Provider, MD       Current medications:    Current Facility-Administered Medications   Medication Dose Route Frequency Provider Last Rate Last Admin    0.9 % sodium chloride infusion   Intravenous Continuous Ashley Escobar  mL/hr at 21 1232 New Bag at 21 1232    ceFAZolin (ANCEF) 3000 mg in dextrose 5 % 100 mL IVPB  3,000 mg Intravenous Once Carlos High DOTTIE           Allergies:     Allergies   Allergen Reactions    Morphine Anaphylaxis    Bactrim [Sulfamethoxazole-Trimethoprim] Hives    Gluten Meal      GI upset    Other      [place] causes GI upset    Ceclor [Cefaclor] Rash    Clindamycin/Lincomycin Hives    Vancomycin Rash     Itching and rash       Problem List:    Patient Active Problem List   Diagnosis Code    Sepsis ([facility] [address] A41.9    Cellulitis of groin, left L03.314    GERD (gastroesophageal reflux disease) K21.9    Epigastric pain R10.13       Past Medical History:        Diagnosis Date    Asthma     seasonal    Blood circulation, collateral     soccer injury    Ana Maria Fry infection     Gastro-oesophageal reflux disease with hiatal hernia     Pneumonia        Past Surgical History:        Procedure Laterality Date    BREAST SURGERY Left 2005    lupmectomy    CHOLECYSTECTOMY  1998    COLONOSCOPY      CYST REMOVAL      ENDOSCOPY, COLON, DIAGNOSTIC      hiatal hernia & abdominal spasms    SKIN BIOPSY  1998    R forearm    UPPER GASTROINTESTINAL ENDOSCOPY  , 16    Dr. Judy Rebolledo       Social History:    Social History     Tobacco Use    Smoking status: Former Smoker     Packs/day: 1.00     Years: 1.00     Pack years: 1.00     Types: Cigarettes     Quit date: 2015     Years since quittin.4    Smokeless tobacco: Never Used   Substance Use Topics    Alcohol use: Yes     Comment: rare                                Counseling given: Not Answered      Vital Signs (Current):   Vitals:    21 1253 21 1131   BP:  (!) 165/78   Pulse:  85   Resp:  16   Temp:  97.7 °F (36.5 °C)   TempSrc:  Temporal   SpO2:  97%   Weight: (!) 390 lb (176.9 kg) (!) 394 lb 12.8 oz (179.1 kg)   Height: 5' 9\" (1.753 m) 5' 9\" (1.753 m)                                              BP Readings from Last 3 Encounters:   21 (!) 165/78   02/10/21 136/74   19 120/84       NPO Status: Time of last liquid consumption: 2300                        Time of last solid consumption:                         Date of last liquid consumption: 05/25/21                        Date of last solid food consumption: 05/25/21    BMI:   Wt Readings from Last 3 Encounters:   05/26/21 (!) 394 lb 12.8 oz (179.1 kg)   02/10/21 (!) 394 lb (178.7 kg)   08/07/19 (!) 388 lb 6.4 oz (176.2 kg)     Body mass index is 58.3 kg/m². CBC:   Lab Results   Component Value Date    WBC 10.1 11/07/2016    RBC 4.09 11/07/2016    HGB 12.2 11/07/2016    HCT 36.8 11/07/2016    MCV 90.1 11/07/2016    RDW 13.5 11/07/2016     11/07/2016       CMP:   Lab Results   Component Value Date     11/07/2016    K 3.5 11/07/2016     11/07/2016    CO2 21 11/07/2016    BUN 12 11/07/2016    CREATININE 0.6 11/07/2016    LABGLOM >90 11/07/2016    GLUCOSE 96 11/07/2016    PROT 7.1 11/07/2016    CALCIUM 8.9 11/07/2016    BILITOT <0.2 11/07/2016    ALKPHOS 118 11/07/2016    AST 14 11/07/2016    ALT 14 11/07/2016       POC Tests: No results for input(s): POCGLU, POCNA, POCK, POCCL, POCBUN, POCHEMO, POCHCT in the last 72 hours.     Coags:   Lab Results   Component Value Date    INR 0.94 09/12/2014    APTT 32.3 09/12/2014       HCG (If Applicable):   Lab Results   Component Value Date    PREGTESTUR NEGATIVE 05/26/2021    PREGSERUM NEGATIVE 08/05/2016        ABGs: No results found for: PHART, PO2ART, WVU7CQL, KOP7PRQ, BEART, J7CKOAUI     Type & Screen (If Applicable):  No results found for: LABABO, LABRH    Drug/Infectious Status (If Applicable):  No results found for: HIV, HEPCAB    COVID-19 Screening (If Applicable): No results found for: COVID19        Anesthesia Evaluation  Patient summary reviewed  Airway: Mallampati: II  TM distance: >3 FB   Neck ROM: full  Mouth opening: > = 3 FB Dental:          Pulmonary:   (+) asthma:                            Cardiovascular:                      Neuro/Psych:   (+) neuromuscular disease:,             GI/Hepatic/Renal:   (+) GERD:, morbid obesity          Endo/Other:                     Abdominal:           Vascular:

## 2021-05-26 NOTE — FLOWSHEET NOTE
Pt admitted to [facility] room 15 and oriented to unit. Fall and allergy bands applied. SCD sleeves applied. Nares swabbed. Pt verbalized permission for first name, last initial and physicians name on white board. SDS board and discharge criteria explained, pt and family verbalized understanding. Pt denies thoughts of harming self or others. Call light in reach. Mother Autumn Duffy at the bedside.

## 2021-05-26 NOTE — PROGRESS NOTES
Pharmacy Consult      Lul Nava is a 41 y.o. female for whom pharmacy has been consulted to dose cefazolin preop per P&T. Patient Active Problem List   Diagnosis    Sepsis ([address]    Cellulitis of groin, left    GERD (gastroesophageal reflux disease)    Epigastric pain       Allergies:  Morphine, Bactrim [sulfamethoxazole-trimethoprim], Gluten meal, Other, Ceclor [cefaclor], Clindamycin/lincomycin, and Vancomycin     No results for input(s): CREATININE in the last 72 hours. Ht/Wt:   Ht Readings from Last 1 Encounters:   05/26/21 5' 9\" (1.753 m)        Wt Readings from Last 1 Encounters:   05/26/21 (!) 394 lb 12.8 oz (179.1 kg)         CrCl cannot be calculated (Patient's most recent lab result is older than the maximum 120 days allowed. ). Assessment/Plan:    Cefazolin 3000 mg IV once preop    Kyra MACEDO Ph., [facility] Center[facility] 5/26/2021 12:25 PM

## 2021-05-27 ENCOUNTER — ANESTHESIA EVENT (OUTPATIENT)
Dept: OPERATING ROOM | Age: 46
End: 2021-05-27
Payer: COMMERCIAL

## 2021-05-27 ENCOUNTER — HOSPITAL ENCOUNTER (OUTPATIENT)
Age: 46
Setting detail: OBSERVATION
Discharge: HOME | End: 2021-05-28
Attending: ORTHOPAEDIC SURGERY | Admitting: ORTHOPAEDIC SURGERY
Payer: COMMERCIAL

## 2021-05-27 ENCOUNTER — ANESTHESIA (OUTPATIENT)
Dept: OPERATING ROOM | Age: 46
End: 2021-05-27
Payer: COMMERCIAL

## 2021-05-27 VITALS — OXYGEN SATURATION: 94 % | DIASTOLIC BLOOD PRESSURE: 84 MMHG | TEMPERATURE: 97.7 F | SYSTOLIC BLOOD PRESSURE: 199 MMHG

## 2021-05-27 DIAGNOSIS — G89.18 ACUTE POST-OPERATIVE PAIN: Primary | ICD-10-CM

## 2021-05-27 PROBLEM — M67.813 BICEPS TENDONOSIS OF RIGHT SHOULDER: Status: ACTIVE | Noted: 2021-05-27

## 2021-05-27 PROCEDURE — 2500000003 HC RX 250 WO HCPCS: Performed by: ORTHOPAEDIC SURGERY

## 2021-05-27 PROCEDURE — 3700000000 HC ANESTHESIA ATTENDED CARE: Performed by: ORTHOPAEDIC SURGERY

## 2021-05-27 PROCEDURE — 2580000003 HC RX 258: Performed by: NURSE ANESTHETIST, CERTIFIED REGISTERED

## 2021-05-27 PROCEDURE — 2580000003 HC RX 258: Performed by: ORTHOPAEDIC SURGERY

## 2021-05-27 PROCEDURE — 6360000002 HC RX W HCPCS: Performed by: ANESTHESIOLOGY

## 2021-05-27 PROCEDURE — G0378 HOSPITAL OBSERVATION PER HR: HCPCS

## 2021-05-27 PROCEDURE — 6360000002 HC RX W HCPCS: Performed by: NURSE ANESTHETIST, CERTIFIED REGISTERED

## 2021-05-27 PROCEDURE — 7100000000 HC PACU RECOVERY - FIRST 15 MIN: Performed by: ORTHOPAEDIC SURGERY

## 2021-05-27 PROCEDURE — 2500000003 HC RX 250 WO HCPCS: Performed by: NURSE ANESTHETIST, CERTIFIED REGISTERED

## 2021-05-27 PROCEDURE — 64415 NJX AA&/STRD BRCH PLXS IMG: CPT | Performed by: ANESTHESIOLOGY

## 2021-05-27 PROCEDURE — 3600000015 HC SURGERY LEVEL 5 ADDTL 15MIN: Performed by: ORTHOPAEDIC SURGERY

## 2021-05-27 PROCEDURE — 3700000001 HC ADD 15 MINUTES (ANESTHESIA): Performed by: ORTHOPAEDIC SURGERY

## 2021-05-27 PROCEDURE — 7100000001 HC PACU RECOVERY - ADDTL 15 MIN: Performed by: ORTHOPAEDIC SURGERY

## 2021-05-27 PROCEDURE — 2709999900 HC NON-CHARGEABLE SUPPLY: Performed by: ORTHOPAEDIC SURGERY

## 2021-05-27 PROCEDURE — 2720000010 HC SURG SUPPLY STERILE: Performed by: ORTHOPAEDIC SURGERY

## 2021-05-27 PROCEDURE — 3600000005 HC SURGERY LEVEL 5 BASE: Performed by: ORTHOPAEDIC SURGERY

## 2021-05-27 PROCEDURE — 6360000002 HC RX W HCPCS: Performed by: ORTHOPAEDIC SURGERY

## 2021-05-27 PROCEDURE — 6370000000 HC RX 637 (ALT 250 FOR IP): Performed by: NURSE PRACTITIONER

## 2021-05-27 RX ORDER — ALBUTEROL SULFATE 90 UG/1
2 AEROSOL, METERED RESPIRATORY (INHALATION) EVERY 4 HOURS PRN
Status: DISCONTINUED | OUTPATIENT
Start: 2021-05-27 | End: 2021-05-28 | Stop reason: HOSPADM

## 2021-05-27 RX ORDER — LIDOCAINE HYDROCHLORIDE AND EPINEPHRINE 10; 10 MG/ML; UG/ML
INJECTION, SOLUTION INFILTRATION; PERINEURAL PRN
Status: DISCONTINUED | OUTPATIENT
Start: 2021-05-27 | End: 2021-05-27 | Stop reason: ALTCHOICE

## 2021-05-27 RX ORDER — CETIRIZINE HYDROCHLORIDE 10 MG/1
10 TABLET ORAL DAILY
Status: DISCONTINUED | OUTPATIENT
Start: 2021-05-27 | End: 2021-05-28 | Stop reason: HOSPADM

## 2021-05-27 RX ORDER — HYDROCODONE BITARTRATE AND ACETAMINOPHEN 5; 325 MG/1; MG/1
2 TABLET ORAL EVERY 4 HOURS PRN
Status: DISCONTINUED | OUTPATIENT
Start: 2021-05-27 | End: 2021-05-28 | Stop reason: HOSPADM

## 2021-05-27 RX ORDER — FENTANYL CITRATE 50 UG/ML
INJECTION, SOLUTION INTRAMUSCULAR; INTRAVENOUS PRN
Status: DISCONTINUED | OUTPATIENT
Start: 2021-05-27 | End: 2021-05-27 | Stop reason: SDUPTHER

## 2021-05-27 RX ORDER — MIDAZOLAM HYDROCHLORIDE 1 MG/ML
INJECTION INTRAMUSCULAR; INTRAVENOUS PRN
Status: DISCONTINUED | OUTPATIENT
Start: 2021-05-27 | End: 2021-05-27 | Stop reason: SDUPTHER

## 2021-05-27 RX ORDER — CITALOPRAM 20 MG/1
20 TABLET ORAL DAILY
Status: DISCONTINUED | OUTPATIENT
Start: 2021-05-27 | End: 2021-05-28 | Stop reason: HOSPADM

## 2021-05-27 RX ORDER — HYDROCODONE BITARTRATE AND ACETAMINOPHEN 5; 325 MG/1; MG/1
1 TABLET ORAL EVERY 4 HOURS PRN
Qty: 30 TABLET | Refills: 0 | Status: SHIPPED | OUTPATIENT
Start: 2021-05-27 | End: 2021-06-01

## 2021-05-27 RX ORDER — SUCCINYLCHOLINE/SOD CL,ISO/PF 200MG/10ML
SYRINGE (ML) INTRAVENOUS PRN
Status: DISCONTINUED | OUTPATIENT
Start: 2021-05-27 | End: 2021-05-27 | Stop reason: SDUPTHER

## 2021-05-27 RX ORDER — ROCURONIUM BROMIDE 10 MG/ML
INJECTION, SOLUTION INTRAVENOUS PRN
Status: DISCONTINUED | OUTPATIENT
Start: 2021-05-27 | End: 2021-05-27 | Stop reason: SDUPTHER

## 2021-05-27 RX ORDER — DEXAMETHASONE SODIUM PHOSPHATE 10 MG/ML
INJECTION, EMULSION INTRAMUSCULAR; INTRAVENOUS PRN
Status: DISCONTINUED | OUTPATIENT
Start: 2021-05-27 | End: 2021-05-27 | Stop reason: SDUPTHER

## 2021-05-27 RX ORDER — SODIUM CHLORIDE 9 MG/ML
INJECTION, SOLUTION INTRAVENOUS CONTINUOUS PRN
Status: DISCONTINUED | OUTPATIENT
Start: 2021-05-27 | End: 2021-05-27 | Stop reason: SDUPTHER

## 2021-05-27 RX ORDER — PANTOPRAZOLE SODIUM 40 MG/1
40 TABLET, DELAYED RELEASE ORAL
Status: DISCONTINUED | OUTPATIENT
Start: 2021-05-28 | End: 2021-05-28 | Stop reason: HOSPADM

## 2021-05-27 RX ORDER — ROPIVACAINE HYDROCHLORIDE 5 MG/ML
INJECTION, SOLUTION EPIDURAL; INFILTRATION; PERINEURAL
Status: COMPLETED | OUTPATIENT
Start: 2021-05-27 | End: 2021-05-27

## 2021-05-27 RX ORDER — ONDANSETRON 2 MG/ML
INJECTION INTRAMUSCULAR; INTRAVENOUS PRN
Status: DISCONTINUED | OUTPATIENT
Start: 2021-05-27 | End: 2021-05-27 | Stop reason: SDUPTHER

## 2021-05-27 RX ORDER — AMPICILLIN AND SULBACTAM 1; .5 G/1; G/1
INJECTION, POWDER, FOR SOLUTION INTRAMUSCULAR; INTRAVENOUS PRN
Status: DISCONTINUED | OUTPATIENT
Start: 2021-05-27 | End: 2021-05-27 | Stop reason: SDUPTHER

## 2021-05-27 RX ORDER — LIDOCAINE HYDROCHLORIDE 10 MG/ML
INJECTION, SOLUTION INFILTRATION; PERINEURAL PRN
Status: DISCONTINUED | OUTPATIENT
Start: 2021-05-27 | End: 2021-05-27 | Stop reason: SDUPTHER

## 2021-05-27 RX ORDER — HYDROMORPHONE HCL 110MG/55ML
PATIENT CONTROLLED ANALGESIA SYRINGE INTRAVENOUS PRN
Status: DISCONTINUED | OUTPATIENT
Start: 2021-05-27 | End: 2021-05-27 | Stop reason: SDUPTHER

## 2021-05-27 RX ORDER — SODIUM CHLORIDE 9 MG/ML
INJECTION, SOLUTION INTRAVENOUS CONTINUOUS
Status: DISCONTINUED | OUTPATIENT
Start: 2021-05-27 | End: 2021-05-28 | Stop reason: HOSPADM

## 2021-05-27 RX ORDER — NORGESTIMATE AND ETHINYL ESTRADIOL 0.25-0.035
1 KIT ORAL DAILY
Status: DISCONTINUED | OUTPATIENT
Start: 2021-05-27 | End: 2021-05-28 | Stop reason: HOSPADM

## 2021-05-27 RX ORDER — HYDROCODONE BITARTRATE AND ACETAMINOPHEN 5; 325 MG/1; MG/1
1 TABLET ORAL EVERY 4 HOURS PRN
Status: DISCONTINUED | OUTPATIENT
Start: 2021-05-27 | End: 2021-05-28 | Stop reason: HOSPADM

## 2021-05-27 RX ORDER — PROPOFOL 10 MG/ML
INJECTION, EMULSION INTRAVENOUS PRN
Status: DISCONTINUED | OUTPATIENT
Start: 2021-05-27 | End: 2021-05-27 | Stop reason: SDUPTHER

## 2021-05-27 RX ORDER — TOPIRAMATE 100 MG/1
200 TABLET, FILM COATED ORAL DAILY
Status: DISCONTINUED | OUTPATIENT
Start: 2021-05-27 | End: 2021-05-28 | Stop reason: HOSPADM

## 2021-05-27 RX ORDER — IBUPROFEN 400 MG/1
200 TABLET ORAL EVERY 6 HOURS PRN
Status: DISCONTINUED | OUTPATIENT
Start: 2021-05-27 | End: 2021-05-28 | Stop reason: HOSPADM

## 2021-05-27 RX ADMIN — ROCURONIUM BROMIDE 10 MG: 10 INJECTION INTRAVENOUS at 15:35

## 2021-05-27 RX ADMIN — CITALOPRAM 20 MG: 20 TABLET, FILM COATED ORAL at 20:45

## 2021-05-27 RX ADMIN — CETIRIZINE HYDROCHLORIDE 10 MG: 10 TABLET, FILM COATED ORAL at 20:45

## 2021-05-27 RX ADMIN — LIDOCAINE HYDROCHLORIDE 100 MG: 10 INJECTION, SOLUTION INFILTRATION; PERINEURAL at 14:51

## 2021-05-27 RX ADMIN — FENTANYL CITRATE 50 MCG: 50 INJECTION, SOLUTION INTRAMUSCULAR; INTRAVENOUS at 15:20

## 2021-05-27 RX ADMIN — FENTANYL CITRATE 50 MCG: 50 INJECTION, SOLUTION INTRAMUSCULAR; INTRAVENOUS at 15:59

## 2021-05-27 RX ADMIN — SODIUM CHLORIDE: 9 INJECTION, SOLUTION INTRAVENOUS at 11:36

## 2021-05-27 RX ADMIN — SODIUM CHLORIDE: 9 INJECTION, SOLUTION INTRAVENOUS at 16:12

## 2021-05-27 RX ADMIN — SODIUM CHLORIDE: 9 INJECTION, SOLUTION INTRAVENOUS at 15:00

## 2021-05-27 RX ADMIN — TOPIRAMATE 200 MG: 100 TABLET, FILM COATED ORAL at 20:45

## 2021-05-27 RX ADMIN — MIDAZOLAM 2 MG: 1 INJECTION INTRAMUSCULAR; INTRAVENOUS at 13:50

## 2021-05-27 RX ADMIN — IBUPROFEN 200 MG: 400 TABLET ORAL at 20:47

## 2021-05-27 RX ADMIN — ROPIVACAINE HYDROCHLORIDE 30 ML: 5 INJECTION, SOLUTION EPIDURAL; INFILTRATION; PERINEURAL at 14:48

## 2021-05-27 RX ADMIN — ONDANSETRON HYDROCHLORIDE 4 MG: 4 INJECTION, SOLUTION INTRAMUSCULAR; INTRAVENOUS at 16:37

## 2021-05-27 RX ADMIN — HYDROMORPHONE HYDROCHLORIDE 1 MG: 2 INJECTION INTRAMUSCULAR; INTRAVENOUS; SUBCUTANEOUS at 16:24

## 2021-05-27 RX ADMIN — ROCURONIUM BROMIDE 40 MG: 10 INJECTION INTRAVENOUS at 14:59

## 2021-05-27 RX ADMIN — FENTANYL CITRATE 100 MCG: 50 INJECTION, SOLUTION INTRAMUSCULAR; INTRAVENOUS at 13:50

## 2021-05-27 RX ADMIN — HYDROCODONE BITARTRATE AND ACETAMINOPHEN 2 TABLET: 5; 325 TABLET ORAL at 18:21

## 2021-05-27 RX ADMIN — SUGAMMADEX 200 MG: 100 INJECTION, SOLUTION INTRAVENOUS at 16:45

## 2021-05-27 RX ADMIN — PROPOFOL 50 MG: 10 INJECTION, EMULSION INTRAVENOUS at 16:03

## 2021-05-27 RX ADMIN — DEXAMETHASONE SODIUM PHOSPHATE 8 MG: 10 INJECTION, EMULSION INTRAMUSCULAR; INTRAVENOUS at 16:00

## 2021-05-27 RX ADMIN — Medication 180 MG: at 14:51

## 2021-05-27 RX ADMIN — NORGESTIMATE AND ETHINYL ESTRADIOL 1 TABLET: KIT ORAL at 20:44

## 2021-05-27 RX ADMIN — AMPICILLIN SODIUM AND SULBACTAM SODIUM 1.5 G: 1; .5 INJECTION, POWDER, FOR SOLUTION INTRAMUSCULAR; INTRAVENOUS at 15:20

## 2021-05-27 RX ADMIN — ROCURONIUM BROMIDE 20 MG: 10 INJECTION INTRAVENOUS at 16:03

## 2021-05-27 ASSESSMENT — PULMONARY FUNCTION TESTS
PIF_VALUE: 23
PIF_VALUE: 23
PIF_VALUE: 22
PIF_VALUE: 25
PIF_VALUE: 13
PIF_VALUE: 23
PIF_VALUE: 26
PIF_VALUE: 5
PIF_VALUE: 25
PIF_VALUE: 24
PIF_VALUE: 23
PIF_VALUE: 5
PIF_VALUE: 25
PIF_VALUE: 24
PIF_VALUE: 23
PIF_VALUE: 34
PIF_VALUE: 22
PIF_VALUE: 1
PIF_VALUE: 24
PIF_VALUE: 24
PIF_VALUE: 41
PIF_VALUE: 24
PIF_VALUE: 26
PIF_VALUE: 25
PIF_VALUE: 24
PIF_VALUE: 8
PIF_VALUE: 29
PIF_VALUE: 29
PIF_VALUE: 22
PIF_VALUE: 24
PIF_VALUE: 32
PIF_VALUE: 23
PIF_VALUE: 24
PIF_VALUE: 22
PIF_VALUE: 24
PIF_VALUE: 14
PIF_VALUE: 25
PIF_VALUE: 14
PIF_VALUE: 29
PIF_VALUE: 0
PIF_VALUE: 24
PIF_VALUE: 24
PIF_VALUE: 31
PIF_VALUE: 23
PIF_VALUE: 1
PIF_VALUE: 22
PIF_VALUE: 25
PIF_VALUE: 23
PIF_VALUE: 32
PIF_VALUE: 14
PIF_VALUE: 26
PIF_VALUE: 0
PIF_VALUE: 30
PIF_VALUE: 23
PIF_VALUE: 0
PIF_VALUE: 26
PIF_VALUE: 22
PIF_VALUE: 32
PIF_VALUE: 24
PIF_VALUE: 24
PIF_VALUE: 28
PIF_VALUE: 6
PIF_VALUE: 24
PIF_VALUE: 24
PIF_VALUE: 26
PIF_VALUE: 29
PIF_VALUE: 23
PIF_VALUE: 25
PIF_VALUE: 25
PIF_VALUE: 24
PIF_VALUE: 21
PIF_VALUE: 24
PIF_VALUE: 30

## 2021-05-27 ASSESSMENT — PAIN - FUNCTIONAL ASSESSMENT
PAIN_FUNCTIONAL_ASSESSMENT: 0-10
PAIN_FUNCTIONAL_ASSESSMENT: ACTIVITIES ARE NOT PREVENTED

## 2021-05-27 ASSESSMENT — PAIN DESCRIPTION - DESCRIPTORS
DESCRIPTORS: ACHING
DESCRIPTORS: ACHING

## 2021-05-27 ASSESSMENT — PAIN DESCRIPTION - LOCATION: LOCATION: HEAD

## 2021-05-27 ASSESSMENT — PAIN DESCRIPTION - PROGRESSION
CLINICAL_PROGRESSION: NOT CHANGED

## 2021-05-27 ASSESSMENT — PAIN SCALES - GENERAL
PAINLEVEL_OUTOF10: 4
PAINLEVEL_OUTOF10: 8
PAINLEVEL_OUTOF10: 7

## 2021-05-27 ASSESSMENT — PAIN DESCRIPTION - FREQUENCY: FREQUENCY: CONTINUOUS

## 2021-05-27 ASSESSMENT — PAIN DESCRIPTION - ORIENTATION: ORIENTATION: ANTERIOR;OTHER (COMMENT)

## 2021-05-27 ASSESSMENT — PAIN DESCRIPTION - PAIN TYPE: TYPE: ACUTE PAIN

## 2021-05-27 ASSESSMENT — PAIN DESCRIPTION - ONSET: ONSET: ON-GOING

## 2021-05-27 NOTE — ANESTHESIA PROCEDURE NOTES
Peripheral Block    Patient location during procedure: PACU  Start time: 5/27/2021 1:55 PM  End time: 5/27/2021 2:15 PM  Staffing  Performed: anesthesiologist   Anesthesiologist: Adam Diego MD  Preanesthetic Checklist  Completed: patient identified, IV checked, site marked, risks and benefits discussed, surgical consent, monitors and equipment checked, pre-op evaluation, timeout performed, anesthesia consent given, oxygen available and patient being monitored  Peripheral Block  Patient position: supine  Prep: ChloraPrep  Patient monitoring: IV access, frequent blood pressure checks, continuous pulse ox and cardiac monitor  Block type: Brachial plexus  Laterality: right  Injection technique: single-shot  Guidance: ultrasound guided  Interscalene  Provider prep: mask and sterile gloves  Needle  Needle type: combined needle/nerve stimulator   Needle gauge: 22 G  Needle length: 5 cm  Needle localization: ultrasound guidance and nerve stimulator  Test dose: negative  Assessment  Injection assessment: negative aspiration for heme, no paresthesia on injection and local visualized surrounding nerve on ultrasound  Paresthesia pain: none  Slow fractionated injection: yes  Hemodynamics: stable  Medications Administered  Ropivacaine (NAROPIN) injection 0.5%, 30 mL  Reason for block: post-op pain management

## 2021-05-27 NOTE — ANESTHESIA PRE PROCEDURE
Department of Anesthesiology  Preprocedure Note       Name:  Dede Lujan   Age:  41 y.o.  :  1975                                          MRN:  [id number]         Date:  2021      Surgeon: Prieto Alvarez):  Snehal Salas DO    Procedure: Procedure(s):  RIGHT SHOULDER ARTHROSCOPY, DISTAL CALVICLE EXCISION, SUBACROMIAL DECOMPRESSION, POSS ROTATOR CUFF REPAIR, TENOTOMY VS BICEP TENODESIS    Medications prior to admission:   Prior to Admission medications    Medication Sig Start Date End Date Taking? Authorizing Provider   loratadine (CLARITIN) 10 MG tablet Take 10 mg by mouth daily as needed    Historical Provider, MD   albuterol sulfate  (90 Base) MCG/ACT inhaler Inhale 2 puffs into the lungs every 4 hours as needed for Wheezing or Shortness of Breath 18   Lilibeth Wang, APRN - CNP   omeprazole (PRILOSEC) 40 MG delayed release capsule Take 40 mg by mouth daily    Historical Provider, MD   Magnesium 400 MG TABS Take by mouth daily     Historical Provider, MD   ibuprofen (ADVIL;MOTRIN) 200 MG tablet Take 200 mg by mouth every 6 hours as needed for Pain    Historical Provider, MD   topiramate (TOPAMAX) 200 MG tablet Take 200 mg by mouth daily     Historical Provider, MD   citalopram (CELEXA) 20 MG tablet Take 20 mg by mouth daily. Historical Provider, MD   norgestimate-ethinyl estradiol ([address] [address]) 0.25-35 MG-MCG per tablet Take 1 tablet by mouth daily. Historical Provider, MD       Current medications:    Current Facility-Administered Medications   Medication Dose Route Frequency Provider Last Rate Last Admin    0.9 % sodium chloride infusion   Intravenous Continuous Snehal Salas DO        ceFAZolin (ANCEF) 2000 mg in dextrose 5 % 50 mL IVPB  2,000 mg Intravenous Once Snehal Salas DO           Allergies:     Allergies   Allergen Reactions    Morphine Anaphylaxis    Bactrim [Sulfamethoxazole-Trimethoprim] Hives    Gluten Meal      GI upset    Other      [place] causes GI upset    Ceclor [Cefaclor] Rash    Clindamycin/Lincomycin Hives    Vancomycin Rash     Itching and rash       Problem List:    Patient Active Problem List   Diagnosis Code    [facility]    Cellulitis of groin, left L03.314    GERD (gastroesophageal reflux disease) K21.9    Epigastric pain R10.13       Past Medical History:        Diagnosis Date    Asthma     seasonal    Blood circulation, collateral     soccer injury    Ana Maria Fry infection     Gastro-oesophageal reflux disease with hiatal hernia     Pneumonia        Past Surgical History:        Procedure Laterality Date    BREAST SURGERY Left 2005    lupmectomy    CHOLECYSTECTOMY  1998    COLONOSCOPY  2012    CYST REMOVAL      ENDOSCOPY, COLON, DIAGNOSTIC      hiatal hernia & abdominal spasms    SKIN BIOPSY  1998    R forearm    UPPER GASTROINTESTINAL ENDOSCOPY  , 16    Dr. Luc Nicolas       Social History:    Social History     Tobacco Use    Smoking status: Former Smoker     Packs/day: 1.00     Years: 1.00     Pack years: 1.00     Types: Cigarettes     Quit date: 2015     Years since quittin.4    Smokeless tobacco: Never Used   Substance Use Topics    Alcohol use: Yes     Comment: rare                                Counseling given: Not Answered      Vital Signs (Current):   Vitals:    21 1059   BP: 134/62   Pulse: 88   Resp: 16   Temp: 96.8 °F (36 °C)   TempSrc: Temporal   SpO2: 98%   Weight: (!) 395 lb 12.8 oz (179.5 kg)   Height: 5' 9\" (1.753 m)                                              BP Readings from Last 3 Encounters:   21 134/62   21 (!) 165/78   02/10/21 136/74       NPO Status:                                                                                 BMI:   Wt Readings from Last 3 Encounters:   21 (!) 395 lb 12.8 oz (179.5 kg)   21 (!) 394 lb 12.8 oz (179.1 kg)   02/10/21 (!) 394 lb (178.7 kg)     Body mass index is 58.45 kg/m².     CBC:   Lab Results Component Value Date    WBC 10.1 11/07/2016    RBC 4.09 11/07/2016    HGB 12.2 11/07/2016    HCT 36.8 11/07/2016    MCV 90.1 11/07/2016    RDW 13.5 11/07/2016     11/07/2016       CMP:   Lab Results   Component Value Date     11/07/2016    K 3.5 11/07/2016     11/07/2016    CO2 21 11/07/2016    BUN 12 11/07/2016    CREATININE 0.6 11/07/2016    LABGLOM >90 11/07/2016    GLUCOSE 96 11/07/2016    PROT 7.1 11/07/2016    CALCIUM 8.9 11/07/2016    BILITOT <0.2 11/07/2016    ALKPHOS 118 11/07/2016    AST 14 11/07/2016    ALT 14 11/07/2016       POC Tests: No results for input(s): POCGLU, POCNA, POCK, POCCL, POCBUN, POCHEMO, POCHCT in the last 72 hours. Coags:   Lab Results   Component Value Date    INR 0.94 09/12/2014    APTT 32.3 09/12/2014       HCG (If Applicable):   Lab Results   Component Value Date    PREGTESTUR NEGATIVE 05/26/2021    PREGSERUM NEGATIVE 08/05/2016        ABGs: No results found for: PHART, PO2ART, EUD3PDW, PKX9XUE, BEART, I5AEWFHD     Type & Screen (If Applicable):  No results found for: LABABO, LABRH    Drug/Infectious Status (If Applicable):  No results found for: HIV, HEPCAB    COVID-19 Screening (If Applicable): No results found for: COVID19        Anesthesia Evaluation    Airway: Mallampati: III  TM distance: >3 FB   Neck ROM: full  Mouth opening: > = 3 FB Dental:          Pulmonary:   (+) decreased breath sounds,  asthma:                            Cardiovascular:            Rhythm: regular                      Neuro/Psych:               GI/Hepatic/Renal:   (+) GERD:, morbid obesity          Endo/Other:                     Abdominal:   (+) obese,         Vascular:                                        Anesthesia Plan      general     ASA 3     (Right interscalene block for post op pain)  Induction: intravenous. MIPS: Postoperative opioids intended and Prophylactic antiemetics administered. Anesthetic plan and risks discussed with patient. Plan discussed with CRNA. Madan Barrera MD   5/27/2021

## 2021-05-27 NOTE — PROGRESS NOTES
Pt  oriented to SDS 9 and unit. Pt verbalized approval for first name, last initial and physician name on unit whiteboard. Fall band applied. Vaccine information given. Plan of care reviewed.

## 2021-05-27 NOTE — PROGRESS NOTES
Transported to Floor in stable condition. 3lpm per n /c. Denies c/o  No change in circ/neuro checks to right hand/arm.

## 2021-05-27 NOTE — PROGRESS NOTES
Asleep/snoring. Arouses easily. When awake, denies c/o pain. Able to wiggle right fingers. Decreased sensation to right fingers. Falls back to sleep. o2 3lpm per n/c on pt.  o2 sat 92-95%.

## 2021-05-27 NOTE — PROGRESS NOTES
Transported to floor in stable condition. Lethargic but arouses easily-snoring. 3lpm per n/c on pt. Denies c/o pain. Able to wiggle right hand fingers, move right arm.  + sensation but decreased. Right arm in sling/ice applied.

## 2021-05-27 NOTE — H&P
Update History & Physical    The patient's History and Physical of May 10, 2021 was reviewed with the patient and I examined the patient. There was no change. The surgical site was confirmed by the patient and me. Plan: The risks, benefits, expected outcome, and alternative to the recommended procedure have been discussed with the patient. Patient understands and wants to proceed with the procedure.      Electronically signed by Shannon Larsen DO on 5/27/2021 at 10:54 AM

## 2021-05-27 NOTE — PROGRESS NOTES
Admitted to PACU from OR  Report received from anesthesia. Denies c/o. Able to wiggle right fingers. + sensation. Right arm in sling/ice applied to shoulder.

## 2021-05-27 NOTE — ANESTHESIA POSTPROCEDURE EVALUATION
Department of Anesthesiology  Postprocedure Note    Patient: Anuradha Chávez  MRN: [id number]  YOB: 1975  Date of evaluation: 5/27/2021  Time:  5:39 PM     Procedure Summary     Date: 05/27/21 Room / Location: [address] [address] / [facility]    Anesthesia Start: 1445 Anesthesia Stop: 7710    Procedure: RIGHT SHOULDER ARTHROSCOPY, DISTAL CALVICLE EXCISION, SUBACROMIAL DECOMPRESSION, POSS ROTATOR CUFF REPAIR, TENOTOMY VS BICEP TENODESIS (Right ) Diagnosis: (BICEP TENDINITIS, STRAIN OF RIGHT SHOULDER)    Surgeons: Obdulia Gale DO Responsible Provider: Yohan Huertas MD    Anesthesia Type: general ASA Status: 3          Anesthesia Type: general    Jenny Phase I: Jenny Score: 8    Jenny Phase II:      Last vitals: Reviewed and per EMR flowsheets. Anesthesia Post Evaluation    Patient location during evaluation: PACU  Patient participation: complete - patient participated  Level of consciousness: awake and alert  Airway patency: patent  Nausea & Vomiting: no nausea and no vomiting  Complications: no  Cardiovascular status: hemodynamically stable  Respiratory status: acceptable  Hydration status: euvolemic  Comments: Patient to remain in hospital for observation. D/w Dr. Quyen Kumar. [facility] Kerns [address]  POST-ANESTHESIA NOTE       Name:  Anuradha Chávez                                         Age:  41 y.o.   MRN:  [id number]      Last Vitals:  BP (!) 154/69   Pulse 86   Temp 97.5 °F (36.4 °C) (Temporal)   Resp 28   Ht 5' 9\" (1.753 m)   Wt (!) 395 lb 12.8 oz (179.5 kg)   LMP 05/12/2021   SpO2 94%   BMI 58.45 kg/m²   Patient Vitals for the past 4 hrs:   BP Temp Temp src Pulse Resp SpO2   05/27/21 1730 (!) 154/69   86 28 94 %   05/27/21 1725 (!) 155/69   87 28 92 %   05/27/21 1720 (!) 143/65   84 24 93 %   05/27/21 1715 (!) 159/69   86 24 95 %   05/27/21 1710 (!) 157/62   86 28 93 %   05/27/21 1705 (!) 163/72   88 28 92 %   05/27/21 1700 (!) 174/73 97.5 °F (36.4 °C) Temporal 93 28 92 % 05/27/21 1440 (!) 118/57   85 26 100 %   05/27/21 1435 120/60   78 26 100 %   05/27/21 1430 (!) 119/58   79 25 99 %   05/27/21 1425 116/60   77 21 100 %   05/27/21 1420 118/72   84 24 98 %   05/27/21 1415 124/74   84 22 98 %   05/27/21 1410 127/73   84 25 99 %   05/27/21 1405 129/75   81 20 99 %   05/27/21 1400 127/65   83 19 98 %   05/27/21 1355 125/70   76 10 100 %   05/27/21 1350 121/66   81 25 100 %   05/27/21 1345 114/62   80 20 100 %       Level of Consciousness:  Awake    Respiratory:  Stable    Oxygen Saturation:  Stable    Cardiovascular:  Stable    Hydration:  Adequate    PONV:  Stable    Post-op Pain:  Adequate analgesia    Post-op Assessment:  No apparent anesthetic complications    Additional Follow-Up / Treatment / Comment:  None    Scott Belcher MD  May 27, 2021   5:39 PM

## 2021-05-27 NOTE — BRIEF OP NOTE
Brief Postoperative Note      Patient: Priscilla Ngo  YOB: 1975  MRN: [id number]    Date of Procedure: 5/27/2021    Pre-Op Diagnosis: BICEP TENDINITIS, STRAIN OF RIGHT SHOULDER    Post-Op Diagnosis: same       Procedure(s):  RIGHT SHOULDER ARTHROSCOPY, DISTAL CALVICLE EXCISION, SUBACROMIAL DECOMPRESSION, POSS ROTATOR CUFF REPAIR, TENOTOMY VS BICEP TENODESIS    Surgeon(s):  Rios Galdamez DO    Assistant:  Jessi Jacobs CNP    Anesthesia: General    Estimated Blood Loss (mL): 50 ml    Complications: none    Specimens: NA      Implants: NA        Drains:     Findings: confirmed  Electronically signed by SHANELL Rivera CNP on 5/27/2021 at 5:20 PM

## 2021-05-27 NOTE — PROGRESS NOTES
1340 PT to PACU, placed on the monitor educated on what to expect for the block   1348 DR Cobos to bedside block discussed with pt all questions answered  1357 Pt medicated with versed and fentanyl   1400 block started  1413 block complete , pt tolerated  1430 resting resp easy   1440 pt taken by cart to OR

## 2021-05-28 VITALS
OXYGEN SATURATION: 99 % | TEMPERATURE: 98.7 F | BODY MASS INDEX: 43.4 KG/M2 | DIASTOLIC BLOOD PRESSURE: 56 MMHG | HEIGHT: 69 IN | HEART RATE: 77 BPM | WEIGHT: 293 LBS | SYSTOLIC BLOOD PRESSURE: 119 MMHG | RESPIRATION RATE: 18 BRPM

## 2021-05-28 PROCEDURE — 6370000000 HC RX 637 (ALT 250 FOR IP): Performed by: NURSE PRACTITIONER

## 2021-05-28 PROCEDURE — G0378 HOSPITAL OBSERVATION PER HR: HCPCS

## 2021-05-28 RX ADMIN — HYDROCODONE BITARTRATE AND ACETAMINOPHEN 1 TABLET: 5; 325 TABLET ORAL at 07:49

## 2021-05-28 RX ADMIN — HYDROCODONE BITARTRATE AND ACETAMINOPHEN 2 TABLET: 5; 325 TABLET ORAL at 01:49

## 2021-05-28 ASSESSMENT — PAIN DESCRIPTION - FREQUENCY: FREQUENCY: CONTINUOUS

## 2021-05-28 ASSESSMENT — PAIN SCALES - GENERAL
PAINLEVEL_OUTOF10: 6
PAINLEVEL_OUTOF10: 4
PAINLEVEL_OUTOF10: 6

## 2021-05-28 ASSESSMENT — PAIN DESCRIPTION - ORIENTATION: ORIENTATION: RIGHT

## 2021-05-28 ASSESSMENT — PAIN DESCRIPTION - PROGRESSION
CLINICAL_PROGRESSION: NOT CHANGED

## 2021-05-28 ASSESSMENT — PAIN DESCRIPTION - LOCATION: LOCATION: SHOULDER

## 2021-05-28 ASSESSMENT — PAIN DESCRIPTION - DESCRIPTORS: DESCRIPTORS: ACHING;BURNING

## 2021-05-28 ASSESSMENT — PAIN DESCRIPTION - ONSET: ONSET: ON-GOING

## 2021-05-28 ASSESSMENT — PAIN - FUNCTIONAL ASSESSMENT: PAIN_FUNCTIONAL_ASSESSMENT: PREVENTS OR INTERFERES SOME ACTIVE ACTIVITIES AND ADLS

## 2021-05-28 ASSESSMENT — PAIN DESCRIPTION - PAIN TYPE: TYPE: ACUTE PAIN;SURGICAL PAIN

## 2021-05-28 NOTE — OP NOTE
of external rotation, 90 degrees of abduction and 80 degrees of internal  rotation. No instability. The patient was then prepped and draped in a  standard surgical fashion. Time-out was taken and correct patient,  procedure and operative site were agreed upon by all who were present. I then injected the planned portal sites as well as the subacromial  space with 20 mL of lidocaine with epinephrine for hemostasis effect. I  then insufflated the joint with normal saline. I made a stab incision  in the posterior shoulder. I gained entry into the joint with a blunt  obturator. I began the diagnostic arthroscopy. There was no  degenerative change in the glenohumeral joint. There was obvious  fraying towards the supraspinatus tendon. The biceps tendon appeared  healthy. There was some labral fraying anterior to superior. I then  made an anterior working portal in the rotator interval.  I continued  the diagnostic arthroscopy. I could elevate the superior labrum from  anterior to the biceps anchor to just posterior to the biceps anchor,  also the superior glenoid. This was consistent with a SLAP tear. After  this, I chose to perform a tenotomy of the biceps. The labrum was then  gently debrided superiorly and anteriorly. Subscap was intact. The  supraspinatus was debrided. There was approximately a 4 cm articular  sided tear after debridement. I debrided this exposed portion of the  footprint just to simulate some healing. I then transitioned the scope  to subacromial space and performed a complete bursectomy. I  skeletonized the acromion. It looks like there is potential os  acromiale. I then performed a bony acromioplasty. I then proceeded to  perform a distal clavicle resection. Bony debris was suctioned from the  subacromial space. The incision was closed with 3-0 Prolene suture. Sterile dressing was applied. The patient was awakened from anesthesia. There were no complications.   She will follow up in 2 weeks for suture  removal.  She will start physical therapy at that time as well.         Mayra Singh D.O.    D: 05/28/2021 7:59:40       T: 05/28/2021 8:10:21     [email or web address]  Job#: [id number]     Doc#: [id number]    CC:

## 2021-05-28 NOTE — DISCHARGE SUMMARY
Bring a paper prescription for each of these medications  · HYDROcodone-acetaminophen 5-325 MG per tablet         Activity: See DCI  Wound Care: See DCI  Diet: Home diet      Follow-up with Viona Mortimer, DO in 10-14 days.     Signed:  Electronically signed by Viona Mortimer, DO on 5/28/2021 at 7:25 AM

## 2021-05-28 NOTE — PROGRESS NOTES
Assisted patient to get dressed. Father here to pick her up and take her home. Discharge instructions reviewed with patient. Verbalized understanding. Prescription given. Ice pack given for ride home. Denies any concerns. [facility] assisted patient down to in wheelchair to discharge lobby.

## 2021-10-08 ENCOUNTER — HOSPITAL ENCOUNTER (EMERGENCY)
Age: 46
Discharge: HOME | End: 2021-10-08
Attending: EMERGENCY MEDICINE
Payer: COMMERCIAL

## 2021-10-08 VITALS
HEIGHT: 69 IN | TEMPERATURE: 98.6 F | BODY MASS INDEX: 43.4 KG/M2 | WEIGHT: 293 LBS | SYSTOLIC BLOOD PRESSURE: 132 MMHG | DIASTOLIC BLOOD PRESSURE: 72 MMHG | HEART RATE: 93 BPM | RESPIRATION RATE: 16 BRPM | OXYGEN SATURATION: 99 %

## 2021-10-08 DIAGNOSIS — K04.7 DENTAL INFECTION: ICD-10-CM

## 2021-10-08 DIAGNOSIS — K08.89 PAIN, DENTAL: Primary | ICD-10-CM

## 2021-10-08 PROCEDURE — 99282 EMERGENCY DEPT VISIT SF MDM: CPT

## 2021-10-08 PROCEDURE — 6370000000 HC RX 637 (ALT 250 FOR IP): Performed by: EMERGENCY MEDICINE

## 2021-10-08 RX ORDER — HYDROCODONE BITARTRATE AND ACETAMINOPHEN 5; 325 MG/1; MG/1
2 TABLET ORAL ONCE
Status: COMPLETED | OUTPATIENT
Start: 2021-10-08 | End: 2021-10-08

## 2021-10-08 RX ORDER — IBUPROFEN 800 MG/1
800 TABLET ORAL EVERY 8 HOURS PRN
Qty: 30 TABLET | Refills: 0 | Status: SHIPPED | OUTPATIENT
Start: 2021-10-08 | End: 2021-11-07

## 2021-10-08 RX ORDER — AMOXICILLIN AND CLAVULANATE POTASSIUM 875; 125 MG/1; MG/1
1 TABLET, FILM COATED ORAL 2 TIMES DAILY
Qty: 20 TABLET | Refills: 0 | Status: SHIPPED | OUTPATIENT
Start: 2021-10-08 | End: 2021-10-08 | Stop reason: SDUPTHER

## 2021-10-08 RX ORDER — IBUPROFEN 800 MG/1
800 TABLET ORAL EVERY 8 HOURS PRN
Qty: 30 TABLET | Refills: 0 | Status: SHIPPED | OUTPATIENT
Start: 2021-10-08 | End: 2021-10-08 | Stop reason: SDUPTHER

## 2021-10-08 RX ORDER — AMOXICILLIN AND CLAVULANATE POTASSIUM 875; 125 MG/1; MG/1
1 TABLET, FILM COATED ORAL 2 TIMES DAILY
Qty: 20 TABLET | Refills: 0 | Status: SHIPPED | OUTPATIENT
Start: 2021-10-08 | End: 2021-10-18

## 2021-10-08 RX ADMIN — HYDROCODONE BITARTRATE AND ACETAMINOPHEN 2 TABLET: 5; 325 TABLET ORAL at 19:18

## 2021-10-08 ASSESSMENT — PAIN DESCRIPTION - LOCATION: LOCATION: MOUTH

## 2021-10-08 ASSESSMENT — ENCOUNTER SYMPTOMS
NAUSEA: 0
SHORTNESS OF BREATH: 0
VOMITING: 0
DIARRHEA: 0
SORE THROAT: 0
TROUBLE SWALLOWING: 0
VOICE CHANGE: 0
CONSTIPATION: 0
RHINORRHEA: 0
ABDOMINAL PAIN: 0
CHEST TIGHTNESS: 0
SINUS PRESSURE: 0
WHEEZING: 0
BACK PAIN: 0
COUGH: 0

## 2021-10-08 ASSESSMENT — PAIN DESCRIPTION - DESCRIPTORS: DESCRIPTORS: STABBING;SHARP

## 2021-10-08 ASSESSMENT — PAIN DESCRIPTION - PAIN TYPE: TYPE: ACUTE PAIN

## 2021-10-08 ASSESSMENT — PAIN DESCRIPTION - FREQUENCY: FREQUENCY: CONTINUOUS

## 2021-10-08 ASSESSMENT — PAIN SCALES - GENERAL
PAINLEVEL_OUTOF10: 10
PAINLEVEL_OUTOF10: 10

## 2021-10-08 ASSESSMENT — PAIN DESCRIPTION - ORIENTATION: ORIENTATION: LEFT

## 2021-10-08 NOTE — ED PROVIDER NOTES
[address] [facility]        Room # E/E    CHIEF COMPLAINT    Chief Complaint   Patient presents with    Dental Pain     left lower       Nurses Notes reviewed and I agree except as noted in the HPI. HPI    Remberto Cramer is a 40 y.o. female who presents for evaluation of dental pain for the past 3 to 4 days. Patient was seen by her dentist and patient was placed on Keflex for the past 3 days without any improvement. Denies any facial swelling, denies any fever, no chills, no shortness of breath or chest pain. Patient states that she had appointment with a dentist next week for a dental extraction    REVIEW OF SYSTEMS    Review of Systems   Constitutional: Negative for appetite change, chills, diaphoresis, fatigue and fever. HENT: Positive for dental problem. Negative for congestion, ear pain, postnasal drip, rhinorrhea, sinus pressure, sneezing, sore throat, trouble swallowing and voice change. Dental pain left lower   Respiratory: Negative for cough, chest tightness, shortness of breath and wheezing. Cardiovascular: Negative for chest pain, palpitations and leg swelling. Gastrointestinal: Negative for abdominal pain, constipation, diarrhea, nausea and vomiting. Musculoskeletal: Negative for arthralgias, back pain, joint swelling, myalgias, neck pain and neck stiffness. Neurological: Negative for dizziness, syncope, weakness, light-headedness, numbness and headaches. PAST MEDICAL HISTORY     has a past medical history of Asthma, Blood circulation, collateral, Ana Maria Barr infection, Gastro-oesophageal reflux disease with hiatal hernia, and Pneumonia. SURGICAL HISTORY     has a past surgical history that includes Cholecystectomy (1998); Endoscopy, colon, diagnostic (1998); skin biopsy (1998); Colonoscopy (2012); Upper gastrointestinal endoscopy (2012, 8/2/16);  Breast surgery (Left, 2005); cyst removal; and Shoulder arthroscopy (Right, 2021). CURRENT MEDICATIONS    Previous Medications    ALBUTEROL SULFATE  (90 BASE) MCG/ACT INHALER    Inhale 2 puffs into the lungs every 4 hours as needed for Wheezing or Shortness of Breath    CITALOPRAM (CELEXA) 20 MG TABLET    Take 20 mg by mouth daily. LORATADINE (CLARITIN) 10 MG TABLET    Take 10 mg by mouth daily as needed    MAGNESIUM 400 MG TABS    Take by mouth daily     NORGESTIMATE-ETHINYL ESTRADIOL (SPRINTEC 28) 0.25-35 MG-MCG PER TABLET    Take 1 tablet by mouth daily. OMEPRAZOLE (PRILOSEC) 40 MG DELAYED RELEASE CAPSULE    Take 40 mg by mouth daily    TOPIRAMATE (TOPAMAX) 200 MG TABLET    Take 200 mg by mouth daily        ALLERGIES    is allergic to morphine, bactrim [sulfamethoxazole-trimethoprim], gluten meal, other, ceclor [cefaclor], clindamycin/lincomycin, and vancomycin. FAMILY HISTORY    She indicated that her mother is alive. She indicated that her father is alive. She indicated that her maternal grandmother is alive. She indicated that her maternal grandfather is . She indicated that her paternal grandmother is . She indicated that her paternal grandfather is alive. She indicated that her paternal aunt is alive. She indicated that her maternal cousin is alive. family history includes Arthritis in her maternal grandmother, mother, and paternal grandfather; Asthma in her mother; Cancer in her maternal grandfather, paternal aunt, paternal grandfather, and paternal grandmother; Depression in her maternal grandmother; Diabetes in her paternal grandfather; Hearing Loss in her maternal grandmother; Heart Disease in her maternal grandmother; High Blood Pressure in her father and maternal grandmother; High Cholesterol in her maternal grandmother; Kidney Disease in her maternal grandmother; Laretta Hove / [place] in her maternal cousin. SOCIAL HISTORY     reports that she quit smoking about 5 years ago.  Her smoking use included cigarettes. She has a 1.00 pack-year smoking history. She has never used smokeless tobacco. She reports current alcohol use. She reports that she does not use drugs. PHYSICAL EXAM      INITIAL VITALS: /72   Pulse 93   Temp 98.6 °F (37 °C) (Oral)   Resp 16   Ht 5' 9\" (1.753 m)   Wt (!) 375 lb (170.1 kg)   SpO2 99%   BMI 55.38 kg/m²  Estimated body mass index is 55.38 kg/m² as calculated from the following:    Height as of this encounter: 5' 9\" (1.753 m). Weight as of this encounter: 375 lb (170.1 kg). Physical Exam  Vitals reviewed. Constitutional:       Appearance: She is well-developed. HENT:      Head: Normocephalic and atraumatic. Right Ear: External ear normal.      Left Ear: External ear normal.      Nose: Nose normal.      Mouth/Throat:      Comments: There is no facial swelling, the left lower premolar showed cavity and tender on the surrounding gums  Eyes:      General: No scleral icterus. Conjunctiva/sclera: Conjunctivae normal.      Pupils: Pupils are equal, round, and reactive to light. Neck:      Thyroid: No thyromegaly. Vascular: No JVD. Cardiovascular:      Rate and Rhythm: Normal rate and regular rhythm. Heart sounds: No murmur heard. No friction rub. Pulmonary:      Effort: Pulmonary effort is normal.      Breath sounds: Normal breath sounds. No wheezing or rales. Chest:      Chest wall: No tenderness. Abdominal:      General: Bowel sounds are normal.      Palpations: Abdomen is soft. There is no mass. Tenderness: There is no abdominal tenderness. Musculoskeletal:      Cervical back: Normal range of motion and neck supple. Lymphadenopathy:      Cervical: No cervical adenopathy. Skin:     Findings: No rash. Neurological:      Mental Status: She is alert and oriented to person, place, and time. Psychiatric:         Behavior: Behavior is cooperative.          MEDICAL DECISION MAKING    DIFFERENTIAL DIAGNOSIS:  Pelvic pain likely infected      DIAGNOSTIC RESULTS  None    Vitals:    Vitals:    10/08/21 1825   BP: 132/72   Pulse: 93   Resp: 16   Temp: 98.6 °F (37 °C)   TempSrc: Oral   SpO2: 99%   Weight: (!) 375 lb (170.1 kg)   Height: 5' 9\" (1.753 m)       EMERGENCY DEPARTMENT COURSE:    Medications - No data to display    The pt was seen and evaluated by me. Within the department, I observed the pt's vitalsigns to be within acceptable range. I observed the pt's condition to more dynamically stable during the duration of their stay. I explained my proposed course of treatment to the pt, and they were amenable to my decision. They were discharged home, and they will return to the ED if their symptoms become more severein nature, or otherwise change. Controlled Substances Monitoring:        CRITICAL CARE:   None. CONSULTS:  None    PROCEDURES:  None. FINAL IMPRESSION       1. Pain, dental    2. Dental infection          DISPOSITION/PLAN  PATIENT REFERRED TO:  Dentist of choice      Dental extraction    DISCHARGEMEDICATIONS:  New Prescriptions    AMOXICILLIN-CLAVULANATE (AUGMENTIN) 875-125 MG PER TABLET    Take 1 tablet by mouth 2 times daily for 10 days    IBUPROFEN (IBU) 800 MG TABLET    Take 1 tablet by mouth every 8 hours as needed for Pain         (Please note that portions of this note were completed with a voice recognition program and electronically transcribed. Efforts were [facility] edit the dictations but occasionally words are mis-transcribed . The transcription may contain errors not detected in proofreading.   This transcription was electronically signed.)         10/08/21 7:14 PM      Shahla Rosenbaum MD      Emergency room physician           Shahla Rosenbaum MD  10/08/21 8793

## 2021-10-08 NOTE — ED TRIAGE NOTES
Patient presents to ER with complaints of left lower dental pain that started 4 days ago. Patient reports being on antibiotic Cephalexin for the past 3 days in which has not helped. Pain reported 10 on a scale of 0-10.

## 2022-05-13 ENCOUNTER — HOSPITAL ENCOUNTER (EMERGENCY)
Age: 47
Discharge: HOME | End: 2022-05-13
Payer: COMMERCIAL

## 2022-05-13 VITALS
OXYGEN SATURATION: 97 % | HEIGHT: 69 IN | HEART RATE: 99 BPM | BODY MASS INDEX: 43.4 KG/M2 | SYSTOLIC BLOOD PRESSURE: 127 MMHG | TEMPERATURE: 97.9 F | WEIGHT: 293 LBS | RESPIRATION RATE: 16 BRPM | DIASTOLIC BLOOD PRESSURE: 65 MMHG

## 2022-05-13 DIAGNOSIS — M77.8 RIGHT WRIST TENDONITIS: Primary | ICD-10-CM

## 2022-05-13 PROCEDURE — L3809 WHFO W/O JOINTS PRE OTS: HCPCS

## 2022-05-13 PROCEDURE — 99213 OFFICE O/P EST LOW 20 MIN: CPT

## 2022-05-13 PROCEDURE — 99213 OFFICE O/P EST LOW 20 MIN: CPT | Performed by: EMERGENCY MEDICINE

## 2022-05-13 RX ORDER — NAPROXEN 500 MG/1
500 TABLET ORAL 2 TIMES DAILY WITH MEALS
Qty: 60 TABLET | Refills: 0 | Status: SHIPPED | OUTPATIENT
Start: 2022-05-13

## 2022-05-13 RX ORDER — IBUPROFEN 400 MG/1
400 TABLET ORAL EVERY 6 HOURS PRN
COMMUNITY

## 2022-05-13 ASSESSMENT — PAIN - FUNCTIONAL ASSESSMENT
PAIN_FUNCTIONAL_ASSESSMENT: 0-10
PAIN_FUNCTIONAL_ASSESSMENT: PREVENTS OR INTERFERES SOME ACTIVE ACTIVITIES AND ADLS

## 2022-05-13 ASSESSMENT — ENCOUNTER SYMPTOMS
COUGH: 0
ABDOMINAL PAIN: 0
BACK PAIN: 0
SHORTNESS OF BREATH: 0

## 2022-05-13 ASSESSMENT — PAIN DESCRIPTION - PAIN TYPE: TYPE: ACUTE PAIN

## 2022-05-13 ASSESSMENT — PAIN DESCRIPTION - DIRECTION: RADIATING_TOWARDS: RIGHT WRIST

## 2022-05-13 ASSESSMENT — PAIN SCALES - GENERAL: PAINLEVEL_OUTOF10: 9

## 2022-05-13 ASSESSMENT — PAIN DESCRIPTION - LOCATION: LOCATION: HAND

## 2022-05-13 ASSESSMENT — PAIN DESCRIPTION - ORIENTATION: ORIENTATION: RIGHT

## 2022-05-13 ASSESSMENT — PAIN DESCRIPTION - DESCRIPTORS: DESCRIPTORS: DISCOMFORT

## 2022-05-13 NOTE — ED TRIAGE NOTES
Patient ambulated to room with complaint of right hand pain. States she noticed weakness in her right hand yesterday but today it hurts to  or write. Denies known injury. 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

## 2022-05-13 NOTE — ED PROVIDER NOTES
[facility]       CHIEF COMPLAINT       Chief Complaint   Patient presents with    Hand Pain     nki       Nurses Notes reviewed and I agree except as noted in the HPI. HISTORY OF PRESENT ILLNESS   Xiomara Gonsales is a 40 y.o. female who presents for complaints of right wrist pain. Pain has been present since yesterday. Pain is on the dorsal aspect of the right wrist and hand. She states it hurts to make a fist and to flex and extend the wrist.  Denies any injury. She has taken ibuprofen which does help with the pain. States the pain kept her awake last night. 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 She does a lot of clicking of the mouse, dragging and dropping with the mouse. She states this aggravates the pain. HPI    REVIEW OF SYSTEMS     Review of Systems   Constitutional: Negative for fatigue and fever. Respiratory: Negative for cough and shortness of breath. Gastrointestinal: Negative for abdominal pain. Musculoskeletal: Positive for arthralgias (right wrist). Negative for back pain and gait problem. Neurological: Negative for dizziness. Psychiatric/Behavioral: Negative for behavioral problems. PAST MEDICAL HISTORY         Diagnosis Date    Asthma     seasonal    Blood circulation, collateral 1995    soccer injury    Karolee Cortez infection     Gastro-oesophageal reflux disease with hiatal hernia     Pneumonia        SURGICALHISTORY     Patient  has a past surgical history that includes Cholecystectomy (1998); Endoscopy, colon, diagnostic (1998); skin biopsy (1998); Colonoscopy (2012); Upper gastrointestinal endoscopy (2012, 8/2/16); Breast surgery (Left, 2005); cyst removal; and Shoulder arthroscopy (Right, 5/27/2021).     CURRENT MEDICATIONS       Previous Medications    ALBUTEROL SULFATE  (90 BASE) MCG/ACT INHALER    Inhale 2 puffs into the lungs every 4 hours as needed for Wheezing or Shortness of Breath    CITALOPRAM (CELEXA) 20 MG TABLET    Take 20 mg by mouth daily. IBUPROFEN (ADVIL;MOTRIN) 400 MG TABLET    Take 400 mg by mouth every 6 hours as needed for Pain    IBUPROFEN (IBU) 800 MG TABLET    Take 1 tablet by mouth every 8 hours as needed for Pain    LORATADINE (CLARITIN) 10 MG TABLET    Take 10 mg by mouth daily as needed    MAGNESIUM 400 MG TABS    Take by mouth daily     NORGESTIMATE-ETHINYL ESTRADIOL (SPRINTEC 28) 0.25-35 MG-MCG PER TABLET    Take 1 tablet by mouth daily. OMEPRAZOLE (PRILOSEC) 40 MG DELAYED RELEASE CAPSULE    Take 40 mg by mouth daily    TOPIRAMATE (TOPAMAX) 200 MG TABLET    Take 200 mg by mouth daily        ALLERGIES     Patient is is allergic to morphine, bactrim [sulfamethoxazole-trimethoprim], gluten meal, other, ceclor [cefaclor], clindamycin/lincomycin, and vancomycin. Patients   Immunization History   Administered Date(s) Administered    Hepatitis B Adult (Engerix-B) 08/22/2012, 09/26/2012    Influenza Virus Vaccine 11/07/2015, 10/12/2018, 01/13/2020    Influenza, MDCK Quadv, IM, PF (Flucelvax 2 yrs and older) 10/17/2018    Influenza, Palak Herb, IM, (6 mo and older Fluzone, Flulaval, Fluarix and 3 yrs and older Afluria) 10/12/2018    Pneumococcal Polysaccharide ([id number]) 01/13/2020       FAMILY HISTORY     Patient's family history includes Arthritis in her maternal grandmother, mother, and paternal grandfather; Asthma in her mother; Cancer in her maternal grandfather, paternal aunt, paternal grandfather, and paternal grandmother; Depression in her maternal grandmother; Diabetes in her paternal grandfather; Hearing Loss in her maternal grandmother; Heart Disease in her maternal grandmother; High Blood Pressure in her father and maternal grandmother; High Cholesterol in her maternal grandmother; Kidney Disease in her maternal grandmother; Dorsie Countess / [place] in her maternal cousin.     SOCIAL HISTORY     Patient  reports that she quit smoking naproxen twice daily, Velcro wrist splint and advised ice for control of pain and symptoms.   Advised to follow-up with [facility] next week if no improvement of symptoms      PATIENT REFERRED TO:  Abel Sanderson, DO  [address] [place] [address] / [facility] [id number]      DISCHARGE MEDICATIONS:  New Prescriptions    NAPROXEN (NAPROSYN) 500 MG TABLET    Take 1 tablet by mouth 2 times daily (with meals)       Discontinued Medications    No medications on file       Current Discharge Medication List          SHANELL Jovel CNP    (Please note that portions of this note were completed with a voice recognition program. Efforts were made to edit the dictations but occasionally words are mis-transcribed.)          SHANELL Jovel CNP  05/13/22 0428

## 2022-08-18 ENCOUNTER — TELEPHONE (OUTPATIENT)
Dept: FAMILY MEDICINE CLINIC | Age: 47
End: 2022-08-18

## 2022-08-18 ENCOUNTER — OFFICE VISIT (OUTPATIENT)
Dept: FAMILY MEDICINE CLINIC | Age: 47
End: 2022-08-18
Payer: COMMERCIAL

## 2022-08-18 VITALS
DIASTOLIC BLOOD PRESSURE: 98 MMHG | TEMPERATURE: 97 F | HEIGHT: 69 IN | BODY MASS INDEX: 43.4 KG/M2 | HEART RATE: 96 BPM | RESPIRATION RATE: 16 BRPM | OXYGEN SATURATION: 99 % | WEIGHT: 293 LBS | SYSTOLIC BLOOD PRESSURE: 138 MMHG

## 2022-08-18 DIAGNOSIS — M79.672 LEFT FOOT PAIN: Primary | ICD-10-CM

## 2022-08-18 PROCEDURE — G8427 DOCREV CUR MEDS BY ELIG CLIN: HCPCS | Performed by: STUDENT IN AN ORGANIZED HEALTH CARE EDUCATION/TRAINING PROGRAM

## 2022-08-18 PROCEDURE — 1036F TOBACCO NON-USER: CPT | Performed by: STUDENT IN AN ORGANIZED HEALTH CARE EDUCATION/TRAINING PROGRAM

## 2022-08-18 PROCEDURE — G8417 CALC BMI ABV UP PARAM F/U: HCPCS | Performed by: STUDENT IN AN ORGANIZED HEALTH CARE EDUCATION/TRAINING PROGRAM

## 2022-08-18 PROCEDURE — 99213 OFFICE O/P EST LOW 20 MIN: CPT | Performed by: STUDENT IN AN ORGANIZED HEALTH CARE EDUCATION/TRAINING PROGRAM

## 2022-08-18 SDOH — ECONOMIC STABILITY: FOOD INSECURITY: WITHIN THE PAST 12 MONTHS, YOU WORRIED THAT YOUR FOOD WOULD RUN OUT BEFORE YOU GOT MONEY TO BUY MORE.: NEVER TRUE

## 2022-08-18 SDOH — ECONOMIC STABILITY: FOOD INSECURITY: WITHIN THE PAST 12 MONTHS, THE FOOD YOU BOUGHT JUST DIDN'T LAST AND YOU DIDN'T HAVE MONEY TO GET MORE.: NEVER TRUE

## 2022-08-18 ASSESSMENT — PATIENT HEALTH QUESTIONNAIRE - PHQ9
SUM OF ALL RESPONSES TO PHQ QUESTIONS 1-9: 0
SUM OF ALL RESPONSES TO PHQ9 QUESTIONS 1 & 2: 0
SUM OF ALL RESPONSES TO PHQ QUESTIONS 1-9: 0
2. FEELING DOWN, DEPRESSED OR HOPELESS: 0
SUM OF ALL RESPONSES TO PHQ QUESTIONS 1-9: 0
SUM OF ALL RESPONSES TO PHQ QUESTIONS 1-9: 0
1. LITTLE INTEREST OR PLEASURE IN DOING THINGS: 0

## 2022-08-18 ASSESSMENT — SOCIAL DETERMINANTS OF HEALTH (SDOH): HOW HARD IS IT FOR YOU TO PAY FOR THE VERY BASICS LIKE FOOD, HOUSING, MEDICAL CARE, AND HEATING?: NOT HARD AT ALL

## 2022-08-18 NOTE — PROGRESS NOTES
[address] [facility] [address] [facility]  Dept: [phone]  Dept Fax: [phone]  Loc: [phone]    HPI:     Nathalia Rojo is a 40 y.o. female who presents today for:  No chief complaint on file. The patient states she     Health Maintenance Topics with due status: Overdue       Topic Date Due    HIV screen Never done    Hepatitis C screen Never done    DTaP/Tdap/Td vaccine Never done    Cervical cancer screen Never done    Diabetes screen Never done    Lipids Never done    Colorectal Cancer Screen Never done    Depression Screen 02/10/2022     Review of Systems    Past Medical History:          Diagnosis Date    Asthma     seasonal    Blood circulation, collateral 1995    soccer injury    Ana Maria Fry infection     Gastro-oesophageal reflux disease with hiatal hernia     Pneumonia        Past Surgical History:          Procedure Laterality Date    BREAST SURGERY Left 2005    lupmectomy    CHOLECYSTECTOMY  1998    COLONOSCOPY  2012    CYST REMOVAL      ENDOSCOPY, COLON, DIAGNOSTIC  1998    hiatal hernia & abdominal spasms    SHOULDER ARTHROSCOPY Right 5/27/2021    RIGHT SHOULDER ARTHROSCOPY, DISTAL CALVICLE EXCISION, SUBACROMIAL DECOMPRESSION BICEP TENOTOMY,  DEBRIDEMENT OF ROTATOR CUFF performed by Feng Lugo DO at [address]  2012, 8/2/16    Dr. Josie Thayer       Medications:      has a current medication list which includes the following prescription(s): ibuprofen, naproxen, ibuprofen, loratadine, albuterol sulfate hfa, omeprazole, magnesium, topiramate, citalopram, and norgestimate-ethinyl estradiol.      Allergies:      Morphine, Bactrim [sulfamethoxazole-trimethoprim], Gluten meal, Other, Ceclor [cefaclor], Clindamycin/lincomycin, and Vancomycin    Social History     Socioeconomic History    Marital status:    Tobacco Use    Smoking status: Former     Packs/day: 1.00     Years: 1.00     Pack years: 1.00     Types: Cigarettes     Quit date: 2015     Years since quittin.6    Smokeless tobacco: Never   Vaping Use    Vaping Use: Never used   Substance and Sexual Activity    Alcohol use: Yes     Comment: rare    Drug use: No    Sexual activity: Never        Family History:          Problem Relation Age of Onset    Arthritis Mother     Asthma Mother     Cancer Paternal Aunt     Arthritis Maternal Grandmother     Depression Maternal Grandmother     Hearing Loss Maternal Grandmother     Heart Disease Maternal Grandmother     High Blood Pressure Maternal Grandmother     High Cholesterol Maternal Grandmother     Kidney Disease Maternal Grandmother     Cancer Maternal Grandfather     Cancer Paternal Grandmother     Arthritis Paternal Grandfather     Cancer Paternal Grandfather     Diabetes Paternal Grandfather     Miscarriages / Stillbirths Maternal Cousin     High Blood Pressure Father        Objective: There were no vitals filed for this visit. Physical Exam  Physical Exam:  GENERAL: Alert and oriented. No distress  EYES: No erythema or icterus  ENT: No thyromegaly or cervical lymphadenopathy. No JVD  HEART: Normal S1/S2. No murmur, rub or gallop  LUNGS: Clear to auscultation  ABDOMEN: Soft and non-tender  EXTREMITIES: no edema  NEUROLOGICAL: Grossly normal  SKIN: no rashes    Assessment/Plan:   No diagnosis found. No follow-ups on file. No future appointments.     Electronically signed by Shruthi Schaefer DO on 2022 at 11:39 AM

## 2022-08-18 NOTE — TELEPHONE ENCOUNTER
----- Message from Lynette Alford DO sent at 8/18/2022  1:10 PM EDT -----  Please call patient and let her know that her xray did not show any fracture, but did show some soft tissue swelling in the area where she had her injury. I would recommend supportive care with ice as needed, ibuprofen or tylenol. She should follow up with her primary care provider if symptoms persist/worsen.  Thanks

## 2022-08-18 NOTE — PROGRESS NOTES
[address] [place] [id number]  Dept: [phone]  Dept Fax: [phone]  Loc: [phone]    Serge Spencer is a 40 y.o. female who presents today for her medical conditions/complaints as noted below. Chief Complaint   Patient presents with    Foot Injury     Left        HPI:     Patient presents the office today with concern of left foot pain after dropping a laptop on the top of it about 1 month ago. States that she dropped a laptop on the top of her left foot near the base of her first 3 toes. Denies any associated weakness, or loss of sensation, though does admit to some diminished sensation in her toes that seems to be associated with swelling in her foot. Patient states that she does have some swelling around the area that is painful. Has been taking ibuprofen as needed for her discomfort. Was not initially evaluated after this injury and did not have any x-ray performed in the past.    Patient follows with Dr. Ibeth King for her primary care needs.       Past Medical History:   Diagnosis Date    Asthma     seasonal    Blood circulation, collateral 1995    soccer injury    Ana Maria Fry infection     Gastro-oesophageal reflux disease with hiatal hernia     Pneumonia       Past Surgical History:   Procedure Laterality Date    BREAST SURGERY Left 2005    lupmectomy    CHOLECYSTECTOMY  1998    COLONOSCOPY  2012    CYST REMOVAL      ENDOSCOPY, COLON, DIAGNOSTIC  1998    hiatal hernia & abdominal spasms    SHOULDER ARTHROSCOPY Right 5/27/2021    RIGHT SHOULDER ARTHROSCOPY, DISTAL CALVICLE EXCISION, SUBACROMIAL DECOMPRESSION BICEP TENOTOMY,  DEBRIDEMENT OF ROTATOR CUFF performed by Juany Garcia DO at [address]  2012, 8/2/16    Dr. Bonny Mack       Family History   Problem Relation Age of Onset    Arthritis Mother     Asthma Mother     Cancer Paternal Aunt     Arthritis Maternal Grandmother     Depression Maternal Grandmother     Hearing Loss Maternal Grandmother     Heart Disease Maternal Grandmother     High Blood Pressure Maternal Grandmother     High Cholesterol Maternal Grandmother     Kidney Disease Maternal Grandmother     Cancer Maternal Grandfather     Cancer Paternal Grandmother     Arthritis Paternal Grandfather     Cancer Paternal Grandfather     Diabetes Paternal Grandfather     Miscarriages / Stillbirths Maternal Cousin     High Blood Pressure Father        Social History     Tobacco Use    Smoking status: Former     Packs/day: 1.00     Years: 1.00     Pack years: 1.00     Types: Cigarettes     Quit date: 2015     Years since quittin.6    Smokeless tobacco: Never   Substance Use Topics    Alcohol use: Yes     Comment: rare      Current Outpatient Medications   Medication Sig Dispense Refill    ibuprofen (ADVIL;MOTRIN) 400 MG tablet Take 400 mg by mouth every 6 hours as needed for Pain      naproxen (NAPROSYN) 500 MG tablet Take 1 tablet by mouth 2 times daily (with meals) 60 tablet 0    loratadine (CLARITIN) 10 MG tablet Take 10 mg by mouth daily as needed      albuterol sulfate  (90 Base) MCG/ACT inhaler Inhale 2 puffs into the lungs every 4 hours as needed for Wheezing or Shortness of Breath 3 Inhaler 1    omeprazole (PRILOSEC) 40 MG delayed release capsule Take 40 mg by mouth daily      Magnesium 400 MG TABS Take by mouth daily       topiramate (TOPAMAX) 200 MG tablet Take 200 mg by mouth daily       citalopram (CELEXA) 20 MG tablet Take 20 mg by mouth daily. norgestimate-ethinyl estradiol (ORTHO-CYCLEN) 0.25-35 MG-MCG per tablet Take 1 tablet by mouth daily. ibuprofen (IBU) 800 MG tablet Take 1 tablet by mouth every 8 hours as needed for Pain (Patient taking differently: Take 400 mg by mouth every 8 hours as needed for Pain ) 30 tablet 0     No current facility-administered medications for this visit. Allergies   Allergen Reactions    Morphine Anaphylaxis    Bactrim [Sulfamethoxazole-Trimethoprim] Hives    Gluten Meal      GI upset    Other      [place] causes GI upset    Ceclor [Cefaclor] Rash    Clindamycin/Lincomycin Hives    Vancomycin Rash     Itching and rash       Health Maintenance   Topic Date Due    HIV screen  Never done    Hepatitis C screen  Never done    DTaP/Tdap/Td vaccine (1 - Tdap) Never done    Cervical cancer screen  Never done    Diabetes screen  Never done    Lipids  Never done    Colorectal Cancer Screen  Never done    Depression Screen  02/10/2022    Flu vaccine (1) 09/01/2022    COVID-19 Vaccine  Completed    Hepatitis A vaccine  Aged Out    Hepatitis B vaccine  Aged Out    Hib vaccine  Aged Out    Meningococcal (ACWY) vaccine  Aged Out    Pneumococcal 0-64 years Vaccine  Aged Out       Subjective:      Review of Systems   Constitutional:  Negative for chills and fever. Musculoskeletal:  Positive for arthralgias (left foot pain). + Left foot swelling   Neurological:  Positive for numbness (diminished sensation in left toes associated with swelling of her foot). Negative for weakness. Objective:     Physical Exam  Vitals and nursing note reviewed. Constitutional:       General: She is not in acute distress. Appearance: Normal appearance. She is morbidly obese. She is not ill-appearing or toxic-appearing. Cardiovascular:      Rate and Rhythm: Normal rate and regular rhythm. Pulses:           Popliteal pulses are 2+ on the right side and 2+ on the left side. Heart sounds: Normal heart sounds. No murmur heard. Comments: Trace nonpitting edema bilateral ankles/feet  Pulmonary:      Effort: Pulmonary effort is normal.      Breath sounds: Normal breath sounds and air entry. Musculoskeletal:      Left ankle: No deformity. No tenderness.       Comments: Mild tenderness upon palpation along base of left great toe base, as well as base of 2nd and 3rd phalanx of left foot; no obvious deformity, erythema, or ecchymosis noted; mild swelling appreciated; cap refill <2, sensation intact but mildly diminished in toes of left foot; no pain upon palpation of left tarsals, metatarsals, or base of 5th metatarsal   Skin:     General: Skin is warm and dry. Capillary Refill: Capillary refill takes less than 2 seconds. Neurological:      General: No focal deficit present. Mental Status: She is alert and oriented to person, place, and time. Motor: Motor function is intact. Gait: Gait is intact. Comments: Able to move all toes of left foot without difficulty   Psychiatric:         Behavior: Behavior is cooperative. BP (!) 138/98   Pulse 96   Temp 97 °F (36.1 °C) (Infrared)   Resp 16   Ht 5' 9\" (1.753 m)   Wt (!) 407 lb 6.4 oz (184.8 kg)   SpO2 99%   BMI 60.16 kg/m²     Assessment/Plan:   Jeison Banerjee was seen today for foot injury. Diagnoses and all orders for this visit:    Left foot pain  -     XR FOOT LEFT (2 VIEWS); Future  -     XR FOOT LEFT (2 VIEWS)    30-year-old female presenting for left foot pain following a foot injury 1 month ago. No red flags on exam today. Good cap refill. Since pain has been ongoing for 1 month, we will plan to obtain foot/toe x-ray to rule out underlying fracture or bony injury-x-ray showed no evidence of acute fracture or dislocation, only soft tissue swelling in area of concern. Patient advised to continue supportive care with rest, ice as needed, ibuprofen or Tylenol as needed for discomfort.       Patient advised to follow-up with her PCP for recheck if symptoms persist.    Electronically signed by Martha Hill DO on 8/18/2022 at 3:33 PM

## 2022-11-12 ENCOUNTER — HOSPITAL ENCOUNTER (OUTPATIENT)
Age: 47
Discharge: HOME | End: 2022-11-12
Payer: COMMERCIAL

## 2022-11-12 ENCOUNTER — HOSPITAL ENCOUNTER (OUTPATIENT)
Dept: GENERAL RADIOLOGY | Age: 47
Discharge: HOME | End: 2022-11-12
Payer: COMMERCIAL

## 2022-11-12 DIAGNOSIS — R52 PAIN: ICD-10-CM

## 2022-11-12 LAB
ALBUMIN SERPL-MCNC: 3.8 G/DL (ref 3.5–5.1)
ALP BLD-CCNC: 172 U/L (ref 38–126)
ALT SERPL-CCNC: 38 U/L (ref 11–66)
ANION GAP SERPL CALCULATED.3IONS-SCNC: 11 MEQ/L (ref 8–16)
AST SERPL-CCNC: 30 U/L (ref 5–40)
BASOPHILS # BLD: 0.3 %
BASOPHILS ABSOLUTE: 0 THOU/MM3 (ref 0–0.1)
BILIRUB SERPL-MCNC: 0.2 MG/DL (ref 0.3–1.2)
BUN BLDV-MCNC: 13 MG/DL (ref 7–22)
C-REACTIVE PROTEIN: 2.87 MG/DL (ref 0–1)
CALCIUM SERPL-MCNC: 9.3 MG/DL (ref 8.5–10.5)
CHLORIDE BLD-SCNC: 104 MEQ/L (ref 98–111)
CO2: 24 MEQ/L (ref 23–33)
CREAT SERPL-MCNC: 0.6 MG/DL (ref 0.4–1.2)
EOSINOPHIL # BLD: 2.9 %
EOSINOPHILS ABSOLUTE: 0.3 THOU/MM3 (ref 0–0.4)
ERYTHROCYTE [DISTWIDTH] IN BLOOD BY AUTOMATED COUNT: 13.5 % (ref 11.5–14.5)
ERYTHROCYTE [DISTWIDTH] IN BLOOD BY AUTOMATED COUNT: 47.4 FL (ref 35–45)
GFR SERPL CREATININE-BSD FRML MDRD: > 60 ML/MIN/1.73M2
GLUCOSE BLD-MCNC: 102 MG/DL (ref 70–108)
HCT VFR BLD CALC: 43.3 % (ref 37–47)
HEMOGLOBIN: 13.7 GM/DL (ref 12–16)
IMMATURE GRANS (ABS): 0.03 THOU/MM3 (ref 0–0.07)
IMMATURE GRANULOCYTES: 0.3 %
LYMPHOCYTES # BLD: 35.5 %
LYMPHOCYTES ABSOLUTE: 3.6 THOU/MM3 (ref 1–4.8)
MAGNESIUM: 2 MG/DL (ref 1.6–2.4)
MCH RBC QN AUTO: 30.2 PG (ref 26–33)
MCHC RBC AUTO-ENTMCNC: 31.6 GM/DL (ref 32.2–35.5)
MCV RBC AUTO: 95.4 FL (ref 81–99)
MONOCYTES # BLD: 5 %
MONOCYTES ABSOLUTE: 0.5 THOU/MM3 (ref 0.4–1.3)
NUCLEATED RED BLOOD CELLS: 0 /100 WBC
PLATELET # BLD: 433 THOU/MM3 (ref 130–400)
PMV BLD AUTO: 8.9 FL (ref 9.4–12.4)
POTASSIUM SERPL-SCNC: 4.8 MEQ/L (ref 3.5–5.2)
RBC # BLD: 4.54 MILL/MM3 (ref 4.2–5.4)
SEDIMENTATION RATE, ERYTHROCYTE: 52 MM/HR (ref 0–20)
SEG NEUTROPHILS: 56 %
SEGMENTED NEUTROPHILS ABSOLUTE COUNT: 5.6 THOU/MM3 (ref 1.8–7.7)
SODIUM BLD-SCNC: 139 MEQ/L (ref 135–145)
TOTAL PROTEIN: 7 G/DL (ref 6.1–8)
TRIGL SERPL-MCNC: 181 MG/DL (ref 0–199)
VITAMIN D 25-HYDROXY: 9 NG/ML (ref 30–100)
WBC # BLD: 10 THOU/MM3 (ref 4.8–10.8)

## 2022-11-12 PROCEDURE — 85025 COMPLETE CBC W/AUTO DIFF WBC: CPT

## 2022-11-12 PROCEDURE — 73564 X-RAY EXAM KNEE 4 OR MORE: CPT

## 2022-11-12 PROCEDURE — 85651 RBC SED RATE NONAUTOMATED: CPT

## 2022-11-12 PROCEDURE — 84478 ASSAY OF TRIGLYCERIDES: CPT

## 2022-11-12 PROCEDURE — 83735 ASSAY OF MAGNESIUM: CPT

## 2022-11-12 PROCEDURE — 82306 VITAMIN D 25 HYDROXY: CPT

## 2022-11-12 PROCEDURE — 86140 C-REACTIVE PROTEIN: CPT

## 2022-11-12 PROCEDURE — 80053 COMPREHEN METABOLIC PANEL: CPT

## 2022-11-12 PROCEDURE — 72100 X-RAY EXAM L-S SPINE 2/3 VWS: CPT

## 2022-11-12 PROCEDURE — 82530 CORTISOL FREE: CPT

## 2022-11-12 PROCEDURE — 36415 COLL VENOUS BLD VENIPUNCTURE: CPT

## 2022-11-16 LAB — CORTISOL (UR), FREE: NORMAL

## 2023-02-26 NOTE — PROGRESS NOTES
[address] [place] [address]  Dept: [phone]  Dept Fax: [phone]  Loc: [phone]    Kamlesh Ram is a 56 y.o. female who presents today for her medical conditions/complaints as noted below. Chief Complaint   Patient presents with    New Patient       HPI:     Patient is here in the office today to establish care. Previous PCP: Abel Sanderson  Specialists: Dr. Rich Banerjee (neurology [place]), [facility] in [facility], [facility]    Past medical history: COVID (11/2020 -- hospitalized for this), GERD, chronic diarrhea (improved currently), anxiety, migraines, reactive airways    Social history:   - Tobacco: former smoker -- quit few years ago  - Alcohol: rare  - Marijuana: denies  - Other drugs: denies  - Employment: works for [employer] (medical records)  - Household: lives at home by herself    Preventative care:   HIV screen: due  Hepatitis C screen: due  Tdap vaccine: UTD per patient  Pap exam: UTD, per patient -- will get records  Diabetes screen: due  Lipid screen: due  Colon cancer screening: [facility] (2022) -- will get records      Chronic diarrhea:  Patient reports a history of chronic diarrhea for which she previously saw gastroenterology. Had a colonoscopy in 2022, which was normal per patient. States that she was told to take fiber supplement to help bulk her stools, which she reports actually constipates her. Denies current issues with diarrhea as this has currently resolved. GERD:  Patient is taking omeprazole 40 mg daily without issues. Denies breakthrough symptoms. Anxiety:  Patient reports that she is taking citalopram 20 mg daily and has been doing well on this for several years. Denies SI or HI. No new concerns at this time. Migraines:  Patient reports that she has a history of migraines without aura. Follows with neurology in [place] at [facility].   Currently taking magnesium, Topamax, and Aimovig for treatment. Vitamin D deficiency:  Vitamin D level was 9 on labs in 11/2022. Patient reports that she was never contacted to start supplementation for this but is agreeable to doing so. Patient also reports that she has a history of reactive airways in which she has been prescribed an albuterol inhaler in the past.  States that over the last 4 days, she has had nasal congestion and cough that she feels is moving into her chest.  Has had a little bit of wheeziness intermittently for which she has been using her albuterol inhaler. Typically does not have to use her albuterol inhaler regularly at all, unless she has a URI that causes exacerbation. Also states that she has seen a functional medicine/holistic provider in the past that was treating her for adrenal issues. Can try to obtain these records as well.       Past Medical History:   Diagnosis Date    Asthma     seasonal    Blood circulation, collateral 1995    soccer injury    Ana Maria Fry infection     Gastro-oesophageal reflux disease with hiatal hernia     Pneumonia       Past Surgical History:   Procedure Laterality Date    BREAST SURGERY Left 2005    lupmectomy    CHOLECYSTECTOMY  1998    COLONOSCOPY  2012    CYST REMOVAL      ENDOSCOPY, COLON, DIAGNOSTIC  1998    hiatal hernia & abdominal spasms    SHOULDER ARTHROSCOPY Right 5/27/2021    RIGHT SHOULDER ARTHROSCOPY, DISTAL CALVICLE EXCISION, SUBACROMIAL DECOMPRESSION BICEP TENOTOMY,  DEBRIDEMENT OF ROTATOR CUFF performed by Joanne Jeong DO at [address]  2012, 8/2/16    Dr. Josie Cole       Family History   Problem Relation Age of Onset    Arthritis Mother     Asthma Mother     Cancer Paternal Aunt     Arthritis Maternal Grandmother     Depression Maternal Grandmother     Hearing Loss Maternal Grandmother     Heart Disease Maternal Grandmother     High Blood Pressure Maternal Grandmother     High Cholesterol Maternal Grandmother     Kidney Disease Maternal Grandmother     Cancer Maternal Grandfather     Cancer Paternal Grandmother     Arthritis Paternal Grandfather     Cancer Paternal Grandfather     Diabetes Paternal Grandfather     Miscarriages / Stillbirths Maternal Cousin     High Blood Pressure Father        Social History     Tobacco Use    Smoking status: Former     Packs/day: 1.00     Years: 1.00     Pack years: 1.00     Types: Cigarettes     Quit date: 2015     Years since quittin.2    Smokeless tobacco: Never   Substance Use Topics    Alcohol use: Yes     Comment: rare      Current Outpatient Medications   Medication Sig Dispense Refill    ondansetron (ZOFRAN-ODT) 4 MG disintegrating tablet       vitamin D (ERGOCALCIFEROL) 1.25 MG (39592 UT) CAPS capsule Take 1 capsule by mouth once a week 4 capsule 2    amoxicillin-clavulanate (AUGMENTIN) 875-125 MG per tablet Take 1 tablet by mouth 2 times daily for 7 days 14 tablet 0    ibuprofen (ADVIL;MOTRIN) 400 MG tablet Take 400 mg by mouth every 6 hours as needed for Pain      loratadine (CLARITIN) 10 MG tablet Take 10 mg by mouth daily as needed      albuterol sulfate  (90 Base) MCG/ACT inhaler Inhale 2 puffs into the lungs every 4 hours as needed for Wheezing or Shortness of Breath 3 Inhaler 1    omeprazole (PRILOSEC) 40 MG delayed release capsule Take 40 mg by mouth daily      Magnesium 400 MG TABS Take by mouth daily       topiramate (TOPAMAX) 200 MG tablet Take 200 mg by mouth daily       citalopram (CELEXA) 20 MG tablet Take 20 mg by mouth daily. No current facility-administered medications for this visit.      Allergies   Allergen Reactions    Morphine Anaphylaxis    Bactrim [Sulfamethoxazole-Trimethoprim] Hives    Gluten Meal      GI upset    Other      [place] causes GI upset    Ceclor [Cefaclor] Rash    Clindamycin/Lincomycin Hives    Vancomycin Rash     Itching and rash       Health Maintenance   Topic Date Due    HIV screen Never done    Hepatitis C screen  Never done    DTaP/Tdap/Td vaccine (1 - Tdap) Never done    Cervical cancer screen  Never done    Diabetes screen  Never done    Lipids  Never done    Colorectal Cancer Screen  Never done    COVID-19 Vaccine (4 - Booster for Pfizer series) 01/21/2022    Depression Screen  02/27/2024    Flu vaccine  Completed    Hepatitis A vaccine  Aged Out    Hib vaccine  Aged Out    Meningococcal (ACWY) vaccine  Aged Out    Pneumococcal 0-64 years Vaccine  Aged Out       Subjective:      Review of Systems   Constitutional:  Negative for chills and fever. HENT:  Positive for congestion and rhinorrhea. Negative for ear discharge and ear pain. Respiratory:  Positive for cough and wheezing (occasional). Negative for shortness of breath. Cardiovascular:  Negative for chest pain. Gastrointestinal:  Negative for abdominal pain, diarrhea (currently resolved), nausea and vomiting. Neurological:  Positive for headaches. Psychiatric/Behavioral:  Negative for dysphoric mood, self-injury and suicidal ideas. The patient is nervous/anxious (stable/controlled). Objective:     Physical Exam  Vitals and nursing note reviewed. Constitutional:       General: She is not in acute distress. Appearance: She is morbidly obese. She is not toxic-appearing. HENT:      Right Ear: Tympanic membrane, ear canal and external ear normal.      Left Ear: Tympanic membrane, ear canal and external ear normal.      Nose: Congestion present. Mouth/Throat:      Lips: Pink. Mouth: Mucous membranes are moist.      Pharynx: Oropharynx is clear. Uvula midline. No oropharyngeal exudate or posterior oropharyngeal erythema. Eyes:      General: Lids are normal.      Conjunctiva/sclera: Conjunctivae normal.   Cardiovascular:      Rate and Rhythm: Normal rate and regular rhythm. Heart sounds: Normal heart sounds. No murmur heard.   Pulmonary:      Effort: Pulmonary effort is normal.      Breath sounds: Normal breath sounds and air entry. No wheezing, rhonchi or rales.   Musculoskeletal:      Cervical back: Neck supple.   Lymphadenopathy:      Cervical: No cervical adenopathy.   Skin:     General: Skin is warm and dry.   Neurological:      General: No focal deficit present.      Mental Status: She is alert and oriented to person, place, and time.      Gait: Gait is intact.   Psychiatric:         Mood and Affect: Mood and affect normal.         Behavior: Behavior is cooperative.     /80 (Site: Left Upper Arm, Position: Sitting, Cuff Size: Large Adult)   Pulse 88   Ht 5' 9\" (1.753 m)   Wt (!) 389 lb 3.2 oz (176.5 kg)   LMP 01/26/2023   SpO2 98%   BMI 57.47 kg/m²     Assessment/Plan:   Poncho was seen today for new patient.    Diagnoses and all orders for this visit:    Encounter for wellness examination in adult  Comments:  47-year-old female here to establish care.  Reports colonoscopy, Pap exam, and Tdap vaccine are up-to-date; we will request records.  Fasting labs ordered as detailed below--to be completed in 10 weeks.  Encouraged healthy diet changes and exercise as able for weight management.  Orders:  -     Comprehensive Metabolic Panel; Future  -     CBC with Auto Differential; Future    Vitamin D deficiency  Comments:  Established, uncontrolled.  Vitamin D level was 9 in 11/2022.  Plan to start patient on 50,000 units once weekly.  Recheck labs in 10 weeks.  Orders:  -     Vitamin D 25 Hydroxy; Future  -     vitamin D (ERGOCALCIFEROL) 1.25 MG (94589 UT) CAPS capsule; Take 1 capsule by mouth once a week    Gastroesophageal reflux disease, unspecified whether esophagitis present  Comments:  Chronic, stable.  Continue omeprazole 40 mg daily as prescribed.  Advised to follow-up with GI for regular screenings.    Anxiety  Comments:  Chronic, stable.  Continue citalopram 20 mg daily as prescribed.  Advised to contact the office with any worsening or uncontrolled symptoms.    Upper respiratory tract  infection, unspecified type  Comments:  Acute cough and congestion x4 days, worsening. COVID-19 test negative today. Patient is nontoxic-appearing in the office today. Due to tobacco use history and RAD, plan to start patient on Augmentin x7 days to cover for URI/sinusitis. Advised to continue supportive care with rest, hydration, Tylenol or Motrin as needed for fever or discomfort. Orders:  -     amoxicillin-clavulanate (AUGMENTIN) 875-125 MG per tablet; Take 1 tablet by mouth 2 times daily for 7 days    Acute cough  Comments:  COVID-19 test negative today. Orders:  -     POCT COVID-19 Rapid, NAAT    Migraine without aura and without status migrainosus, not intractable  Comments:  Established, stable. Taking magnesium, Topamax, and Aimovig. Following with neurology at [facility] [place] --follow-up as scheduled. BMI 50.0-59.9, adult (HCC)  Comments:  BMI 57.47. Encourage healthy diet changes and exercise as able for weight loss management. Orders:  -     Hemoglobin A1C; Future    Former smoker  Comments:  Former smoker -- quit within the last few years. Praised cessation efforts. Lipid screening  Comments:  Fasting lipid panel ordered. Orders:  -     Lipid Panel; Future    Screening for HIV without presence of risk factors  Comments:  HIV screening lab ordered. Orders:  -     HIV Screen; Future    Need for hepatitis C screening test  Comments:  Hepatitis C screening lab ordered. Orders:  -     Hepatitis C Antibody; Future    Plan to request records from previous PCP, neurologist, OB/GYN, and gastroenterology. Return in about 3 months (around 5/31/2023) for follow up lab review.     Electronically signed by Bakari Garrido DO on 2/28/2023 at 7:58 AM

## 2023-02-27 ENCOUNTER — OFFICE VISIT (OUTPATIENT)
Dept: FAMILY MEDICINE CLINIC | Age: 48
End: 2023-02-27
Payer: COMMERCIAL

## 2023-02-27 VITALS
OXYGEN SATURATION: 98 % | HEIGHT: 69 IN | HEART RATE: 88 BPM | SYSTOLIC BLOOD PRESSURE: 128 MMHG | BODY MASS INDEX: 43.4 KG/M2 | WEIGHT: 293 LBS | DIASTOLIC BLOOD PRESSURE: 80 MMHG

## 2023-02-27 DIAGNOSIS — R05.1 ACUTE COUGH: ICD-10-CM

## 2023-02-27 DIAGNOSIS — Z11.4 SCREENING FOR HIV WITHOUT PRESENCE OF RISK FACTORS: ICD-10-CM

## 2023-02-27 DIAGNOSIS — F41.9 ANXIETY: ICD-10-CM

## 2023-02-27 DIAGNOSIS — E55.9 VITAMIN D DEFICIENCY: ICD-10-CM

## 2023-02-27 DIAGNOSIS — K21.9 GASTROESOPHAGEAL REFLUX DISEASE, UNSPECIFIED WHETHER ESOPHAGITIS PRESENT: ICD-10-CM

## 2023-02-27 DIAGNOSIS — Z11.59 NEED FOR HEPATITIS C SCREENING TEST: ICD-10-CM

## 2023-02-27 DIAGNOSIS — G43.009 MIGRAINE WITHOUT AURA AND WITHOUT STATUS MIGRAINOSUS, NOT INTRACTABLE: ICD-10-CM

## 2023-02-27 DIAGNOSIS — J06.9 UPPER RESPIRATORY TRACT INFECTION, UNSPECIFIED TYPE: ICD-10-CM

## 2023-02-27 DIAGNOSIS — Z87.891 FORMER SMOKER: ICD-10-CM

## 2023-02-27 DIAGNOSIS — Z13.220 LIPID SCREENING: ICD-10-CM

## 2023-02-27 DIAGNOSIS — Z00.00 ENCOUNTER FOR WELLNESS EXAMINATION IN ADULT: Primary | ICD-10-CM

## 2023-02-27 LAB
Lab: NORMAL
QC PASS/FAIL: NORMAL
SARS-COV-2 RDRP RESP QL NAA+PROBE: NEGATIVE

## 2023-02-27 PROCEDURE — 87635 SARS-COV-2 COVID-19 AMP PRB: CPT | Performed by: STUDENT IN AN ORGANIZED HEALTH CARE EDUCATION/TRAINING PROGRAM

## 2023-02-27 PROCEDURE — 99396 PREV VISIT EST AGE 40-64: CPT | Performed by: STUDENT IN AN ORGANIZED HEALTH CARE EDUCATION/TRAINING PROGRAM

## 2023-02-27 PROCEDURE — G8482 FLU IMMUNIZE ORDER/ADMIN: HCPCS | Performed by: STUDENT IN AN ORGANIZED HEALTH CARE EDUCATION/TRAINING PROGRAM

## 2023-02-27 RX ORDER — ERGOCALCIFEROL 1.25 MG/1
50000 CAPSULE ORAL WEEKLY
Qty: 4 CAPSULE | Refills: 2 | Status: SHIPPED | OUTPATIENT
Start: 2023-02-27

## 2023-02-27 RX ORDER — ONDANSETRON 4 MG/1
TABLET, ORALLY DISINTEGRATING ORAL
COMMUNITY
Start: 2023-02-13

## 2023-02-27 RX ORDER — AMOXICILLIN AND CLAVULANATE POTASSIUM 875; 125 MG/1; MG/1
1 TABLET, FILM COATED ORAL 2 TIMES DAILY
Qty: 14 TABLET | Refills: 0 | Status: SHIPPED | OUTPATIENT
Start: 2023-02-27 | End: 2023-03-06

## 2023-02-27 SDOH — HEALTH STABILITY: PHYSICAL HEALTH: ON AVERAGE, HOW MANY MINUTES DO YOU ENGAGE IN EXERCISE AT THIS LEVEL?: 20 MIN

## 2023-02-27 SDOH — HEALTH STABILITY: PHYSICAL HEALTH: ON AVERAGE, HOW MANY DAYS PER WEEK DO YOU ENGAGE IN MODERATE TO STRENUOUS EXERCISE (LIKE A BRISK WALK)?: 2 DAYS

## 2023-02-27 SDOH — ECONOMIC STABILITY: FOOD INSECURITY: WITHIN THE PAST 12 MONTHS, YOU WORRIED THAT YOUR FOOD WOULD RUN OUT BEFORE YOU GOT MONEY TO BUY MORE.: NEVER TRUE

## 2023-02-27 SDOH — ECONOMIC STABILITY: FOOD INSECURITY: WITHIN THE PAST 12 MONTHS, THE FOOD YOU BOUGHT JUST DIDN'T LAST AND YOU DIDN'T HAVE MONEY TO GET MORE.: NEVER TRUE

## 2023-02-27 SDOH — ECONOMIC STABILITY: HOUSING INSECURITY
IN THE LAST 12 MONTHS, WAS THERE A TIME WHEN YOU DID NOT HAVE A STEADY PLACE TO SLEEP OR SLEPT IN A SHELTER (INCLUDING NOW)?: NO

## 2023-02-27 SDOH — ECONOMIC STABILITY: INCOME INSECURITY: HOW HARD IS IT FOR YOU TO PAY FOR THE VERY BASICS LIKE FOOD, HOUSING, MEDICAL CARE, AND HEATING?: NOT HARD AT ALL

## 2023-02-27 ASSESSMENT — SOCIAL DETERMINANTS OF HEALTH (SDOH)

## 2023-02-27 ASSESSMENT — PATIENT HEALTH QUESTIONNAIRE - PHQ9
SUM OF ALL RESPONSES TO PHQ QUESTIONS 1-9: 0
SUM OF ALL RESPONSES TO PHQ QUESTIONS 1-9: 0
SUM OF ALL RESPONSES TO PHQ9 QUESTIONS 1 & 2: 0
SUM OF ALL RESPONSES TO PHQ QUESTIONS 1-9: 0
2. FEELING DOWN, DEPRESSED OR HOPELESS: 0
1. LITTLE INTEREST OR PLEASURE IN DOING THINGS: 0
SUM OF ALL RESPONSES TO PHQ QUESTIONS 1-9: 0

## 2023-02-27 NOTE — PATIENT INSTRUCTIONS
- Start taking vitamin D supplement once weekly  - Plan to recheck labs, including vitamin D level in 3 months (5/2023)  - Follow up 1 week after labs are completed

## 2023-02-28 ASSESSMENT — ENCOUNTER SYMPTOMS
RHINORRHEA: 1
NAUSEA: 0
VOMITING: 0
WHEEZING: 1
ABDOMINAL PAIN: 0
SHORTNESS OF BREATH: 0
DIARRHEA: 0
COUGH: 1

## 2023-05-15 ENCOUNTER — OFFICE VISIT (OUTPATIENT)
Dept: FAMILY MEDICINE CLINIC | Age: 48
End: 2023-05-15
Payer: COMMERCIAL

## 2023-05-15 VITALS
OXYGEN SATURATION: 97 % | HEART RATE: 70 BPM | BODY MASS INDEX: 43.4 KG/M2 | SYSTOLIC BLOOD PRESSURE: 134 MMHG | WEIGHT: 293 LBS | HEIGHT: 69 IN | DIASTOLIC BLOOD PRESSURE: 86 MMHG

## 2023-05-15 DIAGNOSIS — Z00.00 ENCOUNTER FOR WELLNESS EXAMINATION IN ADULT: ICD-10-CM

## 2023-05-15 DIAGNOSIS — R22.9 LUMP OF SKIN: Primary | ICD-10-CM

## 2023-05-15 DIAGNOSIS — Z13.220 LIPID SCREENING: ICD-10-CM

## 2023-05-15 DIAGNOSIS — Z11.4 SCREENING FOR HIV WITHOUT PRESENCE OF RISK FACTORS: ICD-10-CM

## 2023-05-15 DIAGNOSIS — Z11.59 NEED FOR HEPATITIS C SCREENING TEST: ICD-10-CM

## 2023-05-15 DIAGNOSIS — E55.9 VITAMIN D DEFICIENCY: ICD-10-CM

## 2023-05-15 LAB
25(OH)D3 SERPL-MCNC: 23 NG/ML (ref 30–100)
ALBUMIN SERPL BCG-MCNC: 3.9 G/DL (ref 3.5–5.1)
ALP SERPL-CCNC: 141 U/L (ref 38–126)
ALT SERPL W/O P-5'-P-CCNC: 25 U/L (ref 11–66)
ANION GAP SERPL CALC-SCNC: 11 MEQ/L (ref 8–16)
AST SERPL-CCNC: 17 U/L (ref 5–40)
BASOPHILS ABSOLUTE: 0 THOU/MM3 (ref 0–0.1)
BASOPHILS NFR BLD AUTO: 0.3 %
BILIRUB SERPL-MCNC: 0.2 MG/DL (ref 0.3–1.2)
BUN SERPL-MCNC: 15 MG/DL (ref 7–22)
CALCIUM SERPL-MCNC: 9 MG/DL (ref 8.5–10.5)
CHLORIDE SERPL-SCNC: 107 MEQ/L (ref 98–111)
CHOLEST SERPL-MCNC: 188 MG/DL (ref 100–199)
CO2 SERPL-SCNC: 22 MEQ/L (ref 23–33)
CREAT SERPL-MCNC: 0.6 MG/DL (ref 0.4–1.2)
DEPRECATED MEAN GLUCOSE BLD GHB EST-ACNC: 105 MG/DL (ref 70–126)
DEPRECATED RDW RBC AUTO: 49.4 FL (ref 35–45)
EOSINOPHIL NFR BLD AUTO: 2.9 %
EOSINOPHILS ABSOLUTE: 0.3 THOU/MM3 (ref 0–0.4)
ERYTHROCYTE [DISTWIDTH] IN BLOOD BY AUTOMATED COUNT: 13.8 % (ref 11.5–14.5)
GFR SERPL CREATININE-BSD FRML MDRD: > 60 ML/MIN/1.73M2
GLUCOSE SERPL-MCNC: 101 MG/DL (ref 70–108)
HBA1C MFR BLD HPLC: 5.5 % (ref 4.4–6.4)
HCT VFR BLD AUTO: 43.1 % (ref 37–47)
HCV IGG SERPL QL IA: NEGATIVE
HDLC SERPL-MCNC: 46 MG/DL
HGB BLD-MCNC: 13.2 GM/DL (ref 12–16)
IMM GRANULOCYTES # BLD AUTO: 0.02 THOU/MM3 (ref 0–0.07)
IMM GRANULOCYTES NFR BLD AUTO: 0.2 %
LDLC SERPL CALC-MCNC: 110 MG/DL
LYMPHOCYTES ABSOLUTE: 3.6 THOU/MM3 (ref 1–4.8)
LYMPHOCYTES NFR BLD AUTO: 35.2 %
MCH RBC QN AUTO: 29.6 PG (ref 26–33)
MCHC RBC AUTO-ENTMCNC: 30.6 GM/DL (ref 32.2–35.5)
MCV RBC AUTO: 96.6 FL (ref 81–99)
MONOCYTES ABSOLUTE: 0.6 THOU/MM3 (ref 0.4–1.3)
MONOCYTES NFR BLD AUTO: 5.6 %
NEUTROPHILS NFR BLD AUTO: 55.8 %
NRBC BLD AUTO-RTO: 0 /100 WBC
PLATELET # BLD AUTO: 413 THOU/MM3 (ref 130–400)
PMV BLD AUTO: 9.5 FL (ref 9.4–12.4)
POTASSIUM SERPL-SCNC: 4.6 MEQ/L (ref 3.5–5.2)
PROT SERPL-MCNC: 7.3 G/DL (ref 6.1–8)
RBC # BLD AUTO: 4.46 MILL/MM3 (ref 4.2–5.4)
SEGMENTED NEUTROPHILS ABSOLUTE COUNT: 5.6 THOU/MM3 (ref 1.8–7.7)
SODIUM SERPL-SCNC: 140 MEQ/L (ref 135–145)
TRIGL SERPL-MCNC: 160 MG/DL (ref 0–199)
WBC # BLD AUTO: 10.1 THOU/MM3 (ref 4.8–10.8)

## 2023-05-15 PROCEDURE — 99213 OFFICE O/P EST LOW 20 MIN: CPT | Performed by: STUDENT IN AN ORGANIZED HEALTH CARE EDUCATION/TRAINING PROGRAM

## 2023-05-15 PROCEDURE — G8417 CALC BMI ABV UP PARAM F/U: HCPCS | Performed by: STUDENT IN AN ORGANIZED HEALTH CARE EDUCATION/TRAINING PROGRAM

## 2023-05-15 PROCEDURE — 1036F TOBACCO NON-USER: CPT | Performed by: STUDENT IN AN ORGANIZED HEALTH CARE EDUCATION/TRAINING PROGRAM

## 2023-05-15 PROCEDURE — 36415 COLL VENOUS BLD VENIPUNCTURE: CPT | Performed by: STUDENT IN AN ORGANIZED HEALTH CARE EDUCATION/TRAINING PROGRAM

## 2023-05-15 PROCEDURE — G8427 DOCREV CUR MEDS BY ELIG CLIN: HCPCS | Performed by: STUDENT IN AN ORGANIZED HEALTH CARE EDUCATION/TRAINING PROGRAM

## 2023-05-15 RX ORDER — ERENUMAB-AOOE 70 MG/ML
INJECTION SUBCUTANEOUS
COMMUNITY
Start: 2023-03-30

## 2023-05-15 NOTE — PROGRESS NOTES
[address] [address]  Dept: [phone]  Dept Fax: [phone]  Loc: [phone]    Sal Santos is a 56 y.o. female who presents today for her medical conditions/complaints as noted below. Chief Complaint   Patient presents with    Mass     Small lump on back of neck in the middle. Doesn't hurt, just tender. Noticed today. No known injury        HPI:     Patient presents the office today for concerns of a small lump on the back of her neck. States that she was coming in to do lab work today anyways but wanted to have a very small lump looked at at the very base of her neck. States that she just noticed it today. Has felt warm at times, but is not large or painful. Mildly tender to touch at times but feels deep under the skin. No drainage or bleeding that she has noticed. States that she does have a history of staph infection in her groin and wanted to make sure that this did not look infected. It is difficult for her to look at this area.       Past Medical History:   Diagnosis Date    Asthma     seasonal    Blood circulation, collateral 1995    soccer injury    Ana Maria Fry infection     Gastro-oesophageal reflux disease with hiatal hernia     Pneumonia       Past Surgical History:   Procedure Laterality Date    BREAST SURGERY Left 2005    lupmectomy    CHOLECYSTECTOMY  1998    COLONOSCOPY  2012    CYST REMOVAL      ENDOSCOPY, COLON, DIAGNOSTIC  1998    hiatal hernia & abdominal spasms    SHOULDER ARTHROSCOPY Right 5/27/2021    RIGHT SHOULDER ARTHROSCOPY, DISTAL CALVICLE EXCISION, SUBACROMIAL DECOMPRESSION BICEP TENOTOMY,  DEBRIDEMENT OF ROTATOR CUFF performed by Rod Scott DO at [facility] forearm    [address] [place] [address]  2012, 8/2/16    Dr. Chirag Santamaria       Family History   Problem Relation Age of Onset    Arthritis Mother     Asthma Mother     Cancer Paternal Aunt

## 2023-05-16 LAB — HIV 1+2 AB+HIV1 P24 AG SERPL QL IA: NONREACTIVE

## 2023-05-22 ENCOUNTER — OFFICE VISIT (OUTPATIENT)
Dept: FAMILY MEDICINE CLINIC | Age: 48
End: 2023-05-22
Payer: COMMERCIAL

## 2023-05-22 VITALS
SYSTOLIC BLOOD PRESSURE: 128 MMHG | DIASTOLIC BLOOD PRESSURE: 76 MMHG | HEIGHT: 69 IN | WEIGHT: 293 LBS | BODY MASS INDEX: 43.4 KG/M2 | RESPIRATION RATE: 16 BRPM | OXYGEN SATURATION: 99 % | HEART RATE: 73 BPM

## 2023-05-22 DIAGNOSIS — E55.9 VITAMIN D DEFICIENCY: ICD-10-CM

## 2023-05-22 DIAGNOSIS — K21.9 GASTROESOPHAGEAL REFLUX DISEASE, UNSPECIFIED WHETHER ESOPHAGITIS PRESENT: Primary | ICD-10-CM

## 2023-05-22 DIAGNOSIS — F41.9 ANXIETY: ICD-10-CM

## 2023-05-22 PROCEDURE — 1036F TOBACCO NON-USER: CPT | Performed by: STUDENT IN AN ORGANIZED HEALTH CARE EDUCATION/TRAINING PROGRAM

## 2023-05-22 PROCEDURE — 99214 OFFICE O/P EST MOD 30 MIN: CPT | Performed by: STUDENT IN AN ORGANIZED HEALTH CARE EDUCATION/TRAINING PROGRAM

## 2023-05-22 PROCEDURE — G8427 DOCREV CUR MEDS BY ELIG CLIN: HCPCS | Performed by: STUDENT IN AN ORGANIZED HEALTH CARE EDUCATION/TRAINING PROGRAM

## 2023-05-22 PROCEDURE — G8417 CALC BMI ABV UP PARAM F/U: HCPCS | Performed by: STUDENT IN AN ORGANIZED HEALTH CARE EDUCATION/TRAINING PROGRAM

## 2023-05-22 RX ORDER — CHOLECALCIFEROL (VITAMIN D3) 125 MCG
1 CAPSULE ORAL DAILY
Qty: 30 CAPSULE | Refills: 2 | Status: SHIPPED | OUTPATIENT
Start: 2023-05-22

## 2023-05-22 RX ORDER — TOPIRAMATE 100 MG/1
TABLET, FILM COATED ORAL
COMMUNITY
Start: 2023-05-13 | End: 2023-05-22

## 2023-05-23 ENCOUNTER — TELEPHONE (OUTPATIENT)
Dept: FAMILY MEDICINE CLINIC | Age: 48
End: 2023-05-23

## 2023-05-23 ASSESSMENT — ENCOUNTER SYMPTOMS
VOMITING: 0
SHORTNESS OF BREATH: 0
NAUSEA: 0
COUGH: 0
ABDOMINAL PAIN: 0

## 2023-05-23 NOTE — TELEPHONE ENCOUNTER
----- Message from Quintin Hernández DO sent at 5/23/2023  1:16 PM EDT -----  Patient had colonoscopy completed in 2022 at [facility]. Can we obtain records?  Thanks    Dr. Jaclyn Lima

## 2023-06-01 ENCOUNTER — OFFICE VISIT (OUTPATIENT)
Dept: FAMILY MEDICINE CLINIC | Age: 48
End: 2023-06-01
Payer: COMMERCIAL

## 2023-06-01 VITALS
BODY MASS INDEX: 43.4 KG/M2 | DIASTOLIC BLOOD PRESSURE: 74 MMHG | WEIGHT: 293 LBS | OXYGEN SATURATION: 99 % | HEART RATE: 88 BPM | HEIGHT: 69 IN | SYSTOLIC BLOOD PRESSURE: 118 MMHG

## 2023-06-01 DIAGNOSIS — N76.4 LABIAL ABSCESS: Primary | ICD-10-CM

## 2023-06-01 PROCEDURE — 1036F TOBACCO NON-USER: CPT | Performed by: NURSE PRACTITIONER

## 2023-06-01 PROCEDURE — G8427 DOCREV CUR MEDS BY ELIG CLIN: HCPCS | Performed by: NURSE PRACTITIONER

## 2023-06-01 PROCEDURE — 99213 OFFICE O/P EST LOW 20 MIN: CPT | Performed by: NURSE PRACTITIONER

## 2023-06-01 PROCEDURE — G8417 CALC BMI ABV UP PARAM F/U: HCPCS | Performed by: NURSE PRACTITIONER

## 2023-06-01 RX ORDER — DOXYCYCLINE HYCLATE 100 MG
100 TABLET ORAL 2 TIMES DAILY
Qty: 14 TABLET | Refills: 0 | Status: SHIPPED | OUTPATIENT
Start: 2023-06-01 | End: 2023-06-01

## 2023-06-01 RX ORDER — DOXYCYCLINE HYCLATE 100 MG
100 TABLET ORAL 2 TIMES DAILY
Qty: 14 TABLET | Refills: 0 | Status: SHIPPED | OUTPATIENT
Start: 2023-06-01 | End: 2023-06-08

## 2023-06-01 ASSESSMENT — ENCOUNTER SYMPTOMS
EYE ITCHING: 0
DIARRHEA: 0
EYE REDNESS: 0
NAUSEA: 0
COUGH: 0
ABDOMINAL PAIN: 0
VOMITING: 0
SHORTNESS OF BREATH: 0

## 2023-10-02 ENCOUNTER — APPOINTMENT (OUTPATIENT)
Dept: ULTRASOUND IMAGING | Age: 48
End: 2023-10-02
Payer: COMMERCIAL

## 2023-10-02 ENCOUNTER — HOSPITAL ENCOUNTER (EMERGENCY)
Age: 48
Discharge: HOME | End: 2023-10-02
Attending: EMERGENCY MEDICINE
Payer: COMMERCIAL

## 2023-10-02 VITALS
TEMPERATURE: 97.9 F | DIASTOLIC BLOOD PRESSURE: 71 MMHG | OXYGEN SATURATION: 98 % | HEIGHT: 69 IN | BODY MASS INDEX: 43.4 KG/M2 | SYSTOLIC BLOOD PRESSURE: 128 MMHG | HEART RATE: 80 BPM | RESPIRATION RATE: 17 BRPM | WEIGHT: 293 LBS

## 2023-10-02 DIAGNOSIS — N93.9 VAGINAL BLEEDING: Primary | ICD-10-CM

## 2023-10-02 DIAGNOSIS — N94.89 UTERINE CRAMPING: ICD-10-CM

## 2023-10-02 LAB
ALBUMIN SERPL BCG-MCNC: 3.6 G/DL (ref 3.5–5.1)
ALP SERPL-CCNC: 122 U/L (ref 38–126)
ALT SERPL W/O P-5'-P-CCNC: 15 U/L (ref 11–66)
ANION GAP SERPL CALC-SCNC: 10 MEQ/L (ref 8–16)
AST SERPL-CCNC: 14 U/L (ref 5–40)
B-HCG SERPL QL: NEGATIVE
BASOPHILS ABSOLUTE: 0 THOU/MM3 (ref 0–0.1)
BASOPHILS NFR BLD AUTO: 0.3 %
BILIRUB SERPL-MCNC: < 0.2 MG/DL (ref 0.3–1.2)
BILIRUB UR QL STRIP.AUTO: NEGATIVE
BUN SERPL-MCNC: 16 MG/DL (ref 7–22)
CALCIUM SERPL-MCNC: 8.7 MG/DL (ref 8.5–10.5)
CHARACTER UR: CLEAR
CHLORIDE SERPL-SCNC: 105 MEQ/L (ref 98–111)
CO2 SERPL-SCNC: 24 MEQ/L (ref 23–33)
COLOR: YELLOW
CREAT SERPL-MCNC: 0.7 MG/DL (ref 0.4–1.2)
DEPRECATED RDW RBC AUTO: 48.6 FL (ref 35–45)
EOSINOPHIL NFR BLD AUTO: 2.3 %
EOSINOPHILS ABSOLUTE: 0.3 THOU/MM3 (ref 0–0.4)
ERYTHROCYTE [DISTWIDTH] IN BLOOD BY AUTOMATED COUNT: 13.9 % (ref 11.5–14.5)
GFR SERPL CREATININE-BSD FRML MDRD: > 60 ML/MIN/1.73M2
GLUCOSE SERPL-MCNC: 93 MG/DL (ref 70–108)
GLUCOSE UR QL STRIP.AUTO: NEGATIVE MG/DL
HCT VFR BLD AUTO: 39.7 % (ref 37–47)
HGB BLD-MCNC: 12.4 GM/DL (ref 12–16)
HGB UR QL STRIP.AUTO: NEGATIVE
IMM GRANULOCYTES # BLD AUTO: 0.03 THOU/MM3 (ref 0–0.07)
IMM GRANULOCYTES NFR BLD AUTO: 0.3 %
KETONES UR QL STRIP.AUTO: ABNORMAL
LYMPHOCYTES ABSOLUTE: 4 THOU/MM3 (ref 1–4.8)
LYMPHOCYTES NFR BLD AUTO: 34.4 %
MCH RBC QN AUTO: 29.7 PG (ref 26–33)
MCHC RBC AUTO-ENTMCNC: 31.2 GM/DL (ref 32.2–35.5)
MCV RBC AUTO: 95.2 FL (ref 81–99)
MONOCYTES ABSOLUTE: 0.6 THOU/MM3 (ref 0.4–1.3)
MONOCYTES NFR BLD AUTO: 5.6 %
NEUTROPHILS NFR BLD AUTO: 57.1 %
NITRITE UR QL STRIP: NEGATIVE
NRBC BLD AUTO-RTO: 0 /100 WBC
OSMOLALITY SERPL CALC.SUM OF ELEC: 278.4 MOSMOL/KG (ref 275–300)
PH UR STRIP.AUTO: 6 [PH] (ref 5–9)
PLATELET # BLD AUTO: 413 THOU/MM3 (ref 130–400)
PMV BLD AUTO: 9.7 FL (ref 9.4–12.4)
POTASSIUM SERPL-SCNC: 4 MEQ/L (ref 3.5–5.2)
PROT SERPL-MCNC: 6.7 G/DL (ref 6.1–8)
PROT UR STRIP.AUTO-MCNC: NEGATIVE MG/DL
RBC # BLD AUTO: 4.17 MILL/MM3 (ref 4.2–5.4)
SEGMENTED NEUTROPHILS ABSOLUTE COUNT: 6.6 THOU/MM3 (ref 1.8–7.7)
SODIUM SERPL-SCNC: 139 MEQ/L (ref 135–145)
SP GR UR REFRACT.AUTO: > 1.03 (ref 1–1.03)
UROBILINOGEN, URINE: 1 EU/DL (ref 0–1)
WBC # BLD AUTO: 11.5 THOU/MM3 (ref 4.8–10.8)
WBC #/AREA URNS HPF: NEGATIVE /[HPF]

## 2023-10-02 PROCEDURE — 76830 TRANSVAGINAL US NON-OB: CPT

## 2023-10-02 PROCEDURE — 80053 COMPREHEN METABOLIC PANEL: CPT

## 2023-10-02 PROCEDURE — 96374 THER/PROPH/DIAG INJ IV PUSH: CPT

## 2023-10-02 PROCEDURE — 84703 CHORIONIC GONADOTROPIN ASSAY: CPT

## 2023-10-02 PROCEDURE — 99284 EMERGENCY DEPT VISIT MOD MDM: CPT

## 2023-10-02 PROCEDURE — 96376 TX/PRO/DX INJ SAME DRUG ADON: CPT

## 2023-10-02 PROCEDURE — 85025 COMPLETE CBC W/AUTO DIFF WBC: CPT

## 2023-10-02 PROCEDURE — 6360000002 HC RX W HCPCS: Performed by: EMERGENCY MEDICINE

## 2023-10-02 PROCEDURE — 81003 URINALYSIS AUTO W/O SCOPE: CPT

## 2023-10-02 PROCEDURE — 36415 COLL VENOUS BLD VENIPUNCTURE: CPT

## 2023-10-02 RX ORDER — HYDROCODONE BITARTRATE AND ACETAMINOPHEN 5; 325 MG/1; MG/1
1 TABLET ORAL EVERY 6 HOURS PRN
Qty: 12 TABLET | Refills: 0 | Status: SHIPPED | OUTPATIENT
Start: 2023-10-02 | End: 2023-10-05

## 2023-10-02 RX ORDER — FENTANYL CITRATE 50 UG/ML
50 INJECTION, SOLUTION INTRAMUSCULAR; INTRAVENOUS ONCE
Status: COMPLETED | OUTPATIENT
Start: 2023-10-02 | End: 2023-10-02

## 2023-10-02 RX ADMIN — FENTANYL CITRATE 50 MCG: 50 INJECTION, SOLUTION INTRAMUSCULAR; INTRAVENOUS at 18:57

## 2023-10-02 RX ADMIN — FENTANYL CITRATE 50 MCG: 50 INJECTION, SOLUTION INTRAMUSCULAR; INTRAVENOUS at 21:05

## 2023-10-02 ASSESSMENT — PAIN DESCRIPTION - LOCATION: LOCATION: ABDOMEN

## 2023-10-02 ASSESSMENT — PAIN SCALES - GENERAL
PAINLEVEL_OUTOF10: 10
PAINLEVEL_OUTOF10: 9
PAINLEVEL_OUTOF10: 7
PAINLEVEL_OUTOF10: 9

## 2023-10-02 ASSESSMENT — PAIN - FUNCTIONAL ASSESSMENT
PAIN_FUNCTIONAL_ASSESSMENT: 0-10

## 2023-10-02 ASSESSMENT — PAIN DESCRIPTION - DESCRIPTORS: DESCRIPTORS: CRAMPING

## 2023-10-02 NOTE — ED NOTES
Patient given ice chips at this time. Patient denies any other needs.      Eva Mcleod RN  10/02/23 0194

## 2023-10-02 NOTE — ED TRIAGE NOTES
Patient to ED c/o abdominal cramping. Patient states she had an IUD placed 9/12/23. States she has had abdominal cramping through the weekend with it worsening. States gynecologist office says her body is \"adjusting\" to the placement. Patient reports she started spotting 9/30. Rates lower abdominal pain 10/10. IV access established.

## 2023-10-02 NOTE — ED NOTES
Patient medicated per MAR. Urine sample collected and sent. Patient resting in bed. Respirations easy and unlabored. No distress noted. Call light within reach.      Orlando Kaur RN  10/02/23 1912

## 2023-10-03 ENCOUNTER — TELEPHONE (OUTPATIENT)
Dept: FAMILY MEDICINE CLINIC | Age: 48
End: 2023-10-03

## 2023-10-03 NOTE — ED PROVIDER NOTES
[facility]    Pt Name: Jacob Burr  MRN: [id number]  [facility] [place] 1975  Date of evaluation: 9/12/20      I personally saw and examined the patient. I have reviewed and agree with the Resident findings, including all diagnostic interpretations and treatment plans as written. I was present for the key portion of any procedures performed and the inclusive time noted in any critical care statement. History:     Patient was seen with Conception Adjutant, resident physician    This is a 41-year-old female complaining of abnormal uterine bleeding. She has been under the care of OB/GYN, has had ongoing bleeding problems. Recently had an IUD in to try to help with this has now developed some cramping bilaterally. We did a pelvic ultrasound to check the placement of the IUD which apparently looks okay. No obvious acute pathology. We have checked her hemoglobin which appears to be stable. We are referring her back to OB/GYN on an outpatient basis.     Diagnosis: Abnormal uterine bleeding  discharged                Marisol Oneal, [facility],   10/03/23 7782
proofreading.     Electronically Signed: Jimmy Reinoso MD, 10/03/23, 4:58 PM      Jimmy Reinoso MD  Resident  10/03/23 1223

## 2023-10-03 NOTE — DISCHARGE INSTRUCTIONS
You were seen at [facility] emergency department for vaginal bleeding and cramping since having an IUD placed. Your lab work was normal, and there was no evidence of UTI in your urinalysis. Transvaginal ultrasound showed a properly placed IUD. At this time, you are able to return home. Please follow-up with your OB/GYN ASAP. Preferably within the next 48 hours.   If the bleeding worsens, please return to the emergency department

## 2023-11-13 DIAGNOSIS — D72.829 LEUKOCYTOSIS, UNSPECIFIED TYPE: ICD-10-CM

## 2023-11-13 DIAGNOSIS — K21.9 GASTROESOPHAGEAL REFLUX DISEASE, UNSPECIFIED WHETHER ESOPHAGITIS PRESENT: ICD-10-CM

## 2023-11-13 DIAGNOSIS — E55.9 VITAMIN D DEFICIENCY: Primary | ICD-10-CM

## 2023-11-13 DIAGNOSIS — F41.9 ANXIETY: ICD-10-CM

## 2023-11-13 RX ORDER — CITALOPRAM 20 MG/1
20 TABLET ORAL DAILY
Qty: 30 TABLET | Refills: 5 | Status: SHIPPED | OUTPATIENT
Start: 2023-11-13 | End: 2023-11-15 | Stop reason: SDUPTHER

## 2023-11-13 RX ORDER — OMEPRAZOLE 40 MG/1
40 CAPSULE, DELAYED RELEASE ORAL DAILY
Qty: 30 CAPSULE | Refills: 5 | Status: SHIPPED | OUTPATIENT
Start: 2023-11-13 | End: 2023-11-15 | Stop reason: SDUPTHER

## 2023-11-13 NOTE — TELEPHONE ENCOUNTER
Refill sent to pharmacy. Please let patient know that I would like to recheck her vitamin D level and also her CBC prior to her next appointment. Labs were ordered.   Thanks    Katiana Lopez DO

## 2023-11-13 NOTE — TELEPHONE ENCOUNTER
Patient's last appointment was : 6/1/2023  Patient's next appointment is : 11/20/2023  Last sent in: Celexa- 4-13-23 20 mg tablets, 30 tab with 5 refills  Omeprazole- 4-13-23 40 mg, 30 capsules with 5 refills         Patient also wondering if she needs updated blood work before appt on 11-20?

## 2023-11-15 ENCOUNTER — TELEPHONE (OUTPATIENT)
Dept: FAMILY MEDICINE CLINIC | Age: 48
End: 2023-11-15

## 2023-11-15 DIAGNOSIS — F41.9 ANXIETY: ICD-10-CM

## 2023-11-15 DIAGNOSIS — K21.9 GASTROESOPHAGEAL REFLUX DISEASE, UNSPECIFIED WHETHER ESOPHAGITIS PRESENT: ICD-10-CM

## 2023-11-15 RX ORDER — CITALOPRAM 20 MG/1
20 TABLET ORAL DAILY
Qty: 30 TABLET | Refills: 5 | Status: SHIPPED | OUTPATIENT
Start: 2023-11-15

## 2023-11-15 RX ORDER — OMEPRAZOLE 40 MG/1
40 CAPSULE, DELAYED RELEASE ORAL DAILY
Qty: 30 CAPSULE | Refills: 5 | Status: SHIPPED | OUTPATIENT
Start: 2023-11-15

## 2023-11-15 NOTE — TELEPHONE ENCOUNTER
Patient called stating her omeprazole and Celexa was called into the wrong pharmacy.  She uses [id number]

## 2023-11-17 ENCOUNTER — NURSE ONLY (OUTPATIENT)
Dept: FAMILY MEDICINE CLINIC | Age: 48
End: 2023-11-17
Payer: COMMERCIAL

## 2023-11-17 DIAGNOSIS — E55.9 VITAMIN D DEFICIENCY: ICD-10-CM

## 2023-11-17 DIAGNOSIS — D72.829 LEUKOCYTOSIS, UNSPECIFIED TYPE: ICD-10-CM

## 2023-11-17 LAB
25(OH)D3 SERPL-MCNC: 29 NG/ML (ref 30–100)
BASOPHILS ABSOLUTE: 0 THOU/MM3 (ref 0–0.1)
BASOPHILS NFR BLD AUTO: 0.4 %
DEPRECATED RDW RBC AUTO: 47.4 FL (ref 35–45)
EOSINOPHIL NFR BLD AUTO: 2.7 %
EOSINOPHILS ABSOLUTE: 0.3 THOU/MM3 (ref 0–0.4)
ERYTHROCYTE [DISTWIDTH] IN BLOOD BY AUTOMATED COUNT: 13.7 % (ref 11.5–14.5)
HCT VFR BLD AUTO: 39.6 % (ref 37–47)
HGB BLD-MCNC: 12.2 GM/DL (ref 12–16)
IMM GRANULOCYTES # BLD AUTO: 0.04 THOU/MM3 (ref 0–0.07)
IMM GRANULOCYTES NFR BLD AUTO: 0.3 %
LYMPHOCYTES ABSOLUTE: 3.7 THOU/MM3 (ref 1–4.8)
LYMPHOCYTES NFR BLD AUTO: 31.8 %
MCH RBC QN AUTO: 29.2 PG (ref 26–33)
MCHC RBC AUTO-ENTMCNC: 30.8 GM/DL (ref 32.2–35.5)
MCV RBC AUTO: 94.7 FL (ref 81–99)
MONOCYTES ABSOLUTE: 0.8 THOU/MM3 (ref 0.4–1.3)
MONOCYTES NFR BLD AUTO: 6.6 %
NEUTROPHILS NFR BLD AUTO: 58.2 %
NRBC BLD AUTO-RTO: 0 /100 WBC
PLATELET # BLD AUTO: 406 THOU/MM3 (ref 130–400)
PMV BLD AUTO: 9.3 FL (ref 9.4–12.4)
RBC # BLD AUTO: 4.18 MILL/MM3 (ref 4.2–5.4)
SEGMENTED NEUTROPHILS ABSOLUTE COUNT: 6.8 THOU/MM3 (ref 1.8–7.7)
WBC # BLD AUTO: 11.6 THOU/MM3 (ref 4.8–10.8)

## 2023-11-17 PROCEDURE — 36415 COLL VENOUS BLD VENIPUNCTURE: CPT | Performed by: STUDENT IN AN ORGANIZED HEALTH CARE EDUCATION/TRAINING PROGRAM

## 2023-11-20 ENCOUNTER — OFFICE VISIT (OUTPATIENT)
Dept: FAMILY MEDICINE CLINIC | Age: 48
End: 2023-11-20
Payer: COMMERCIAL

## 2023-11-20 VITALS
HEIGHT: 69 IN | BODY MASS INDEX: 43.4 KG/M2 | OXYGEN SATURATION: 98 % | RESPIRATION RATE: 16 BRPM | WEIGHT: 293 LBS | HEART RATE: 100 BPM | DIASTOLIC BLOOD PRESSURE: 76 MMHG | SYSTOLIC BLOOD PRESSURE: 126 MMHG

## 2023-11-20 DIAGNOSIS — Z23 INFLUENZA VACCINE NEEDED: ICD-10-CM

## 2023-11-20 DIAGNOSIS — K59.00 CONSTIPATION, UNSPECIFIED CONSTIPATION TYPE: ICD-10-CM

## 2023-11-20 DIAGNOSIS — D72.829 LEUKOCYTOSIS, UNSPECIFIED TYPE: Primary | ICD-10-CM

## 2023-11-20 DIAGNOSIS — F41.9 ANXIETY: ICD-10-CM

## 2023-11-20 DIAGNOSIS — N92.0 MENORRHAGIA WITH REGULAR CYCLE: ICD-10-CM

## 2023-11-20 DIAGNOSIS — K21.9 GASTROESOPHAGEAL REFLUX DISEASE, UNSPECIFIED WHETHER ESOPHAGITIS PRESENT: ICD-10-CM

## 2023-11-20 DIAGNOSIS — E55.9 VITAMIN D DEFICIENCY: ICD-10-CM

## 2023-11-20 DIAGNOSIS — G43.009 MIGRAINE WITHOUT AURA AND WITHOUT STATUS MIGRAINOSUS, NOT INTRACTABLE: ICD-10-CM

## 2023-11-20 PROCEDURE — 1036F TOBACCO NON-USER: CPT | Performed by: STUDENT IN AN ORGANIZED HEALTH CARE EDUCATION/TRAINING PROGRAM

## 2023-11-20 PROCEDURE — 90471 IMMUNIZATION ADMIN: CPT | Performed by: STUDENT IN AN ORGANIZED HEALTH CARE EDUCATION/TRAINING PROGRAM

## 2023-11-20 PROCEDURE — G8427 DOCREV CUR MEDS BY ELIG CLIN: HCPCS | Performed by: STUDENT IN AN ORGANIZED HEALTH CARE EDUCATION/TRAINING PROGRAM

## 2023-11-20 PROCEDURE — 99214 OFFICE O/P EST MOD 30 MIN: CPT | Performed by: STUDENT IN AN ORGANIZED HEALTH CARE EDUCATION/TRAINING PROGRAM

## 2023-11-20 PROCEDURE — 36415 COLL VENOUS BLD VENIPUNCTURE: CPT | Performed by: STUDENT IN AN ORGANIZED HEALTH CARE EDUCATION/TRAINING PROGRAM

## 2023-11-20 PROCEDURE — G8417 CALC BMI ABV UP PARAM F/U: HCPCS | Performed by: STUDENT IN AN ORGANIZED HEALTH CARE EDUCATION/TRAINING PROGRAM

## 2023-11-20 PROCEDURE — 90674 CCIIV4 VAC NO PRSV 0.5 ML IM: CPT | Performed by: STUDENT IN AN ORGANIZED HEALTH CARE EDUCATION/TRAINING PROGRAM

## 2023-11-20 PROCEDURE — G8482 FLU IMMUNIZE ORDER/ADMIN: HCPCS | Performed by: STUDENT IN AN ORGANIZED HEALTH CARE EDUCATION/TRAINING PROGRAM

## 2023-11-20 RX ORDER — ZINC GLUCONATE 50 MG
50 TABLET ORAL DAILY
COMMUNITY

## 2023-11-20 RX ORDER — TOPIRAMATE 100 MG/1
TABLET, FILM COATED ORAL
COMMUNITY
Start: 2023-10-03

## 2023-11-20 RX ORDER — LEVONORGESTREL 52 MG/1
52 INTRAUTERINE DEVICE INTRAUTERINE
COMMUNITY
Start: 2023-09-12

## 2023-11-20 ASSESSMENT — ENCOUNTER SYMPTOMS
ABDOMINAL PAIN: 0
SHORTNESS OF BREATH: 0
COUGH: 0
DIARRHEA: 0
RHINORRHEA: 0
VOMITING: 0
CONSTIPATION: 1
NAUSEA: 0
ANAL BLEEDING: 1

## 2023-11-20 NOTE — PROGRESS NOTES
After obtaining consent, and per orders of Dr. Sirena Quiros, injection of Flucelvax given in Right deltoid by Marycruz Durant MA. Patient instructed to remain in clinic for 20 minutes afterwards, and to report any adverse reaction to me immediately. Immunizations Administered       Name Date Dose Route    Influenza, FLUCELVAX, (age 10 mo+), MDCK, PF, 0.5mL 11/20/2023 0.5 mL Intramuscular    Site: Deltoid- Right    Lot: [id number]    NDC: 97847-747-76            Pt tolerated injection well. VIS given to pt, vaccine checklist completed.
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
## 2023-11-21 LAB
FERRITIN SERPL IA-MCNC: 77 NG/ML (ref 10–291)
IRON SERPL-MCNC: 63 UG/DL (ref 50–170)
TIBC SERPL-MCNC: 309 UG/DL (ref 171–450)

## 2024-01-19 LAB
BASOPHILS ABSOLUTE: 0 /ΜL
BASOPHILS RELATIVE PERCENT: 0.3 %
BILIRUBIN, URINE: NEGATIVE
BLOOD, URINE: NEGATIVE
BUN BLDV-MCNC: 19 MG/DL
CALCIUM SERPL-MCNC: 8.9 MG/DL
CHLORIDE BLD-SCNC: 106 MMOL/L
CLARITY: CLEAR
CO2: 22 MMOL/L
COLOR: YELLOW
CREAT SERPL-MCNC: 0.75 MG/DL
EGFR: NORMAL
EOSINOPHILS ABSOLUTE: 0.3 /ΜL
EOSINOPHILS RELATIVE PERCENT: 2.3 %
GLUCOSE BLD-MCNC: 95 MG/DL
GLUCOSE URINE: NEGATIVE
HCT VFR BLD CALC: 39 % (ref 36–46)
HEMOGLOBIN: 13.2 G/DL (ref 12–16)
KETONES, URINE: NEGATIVE
LEUKOCYTE ESTERASE, URINE: NEGATIVE
LYMPHOCYTES ABSOLUTE: 3.6 /ΜL
LYMPHOCYTES RELATIVE PERCENT: 31.9 %
MCH RBC QN AUTO: 30 PG
MCHC RBC AUTO-ENTMCNC: 33.8 G/DL
MCV RBC AUTO: 88.9 FL
MONOCYTES ABSOLUTE: 0.7 /ΜL
MONOCYTES RELATIVE PERCENT: 5.9 %
NEUTROPHILS ABSOLUTE: 6.8 /ΜL
NEUTROPHILS RELATIVE PERCENT: 59.6 %
NITRITE, URINE: NEGATIVE
PDW BLD-RTO: 14 %
PH UA: 5.5 (ref 4.5–8)
PLATELET # BLD: 436 K/ΜL
PMV BLD AUTO: 7.4 FL
POTASSIUM SERPL-SCNC: 3.6 MMOL/L
PROTEIN UA: NEGATIVE
RBC # BLD: 4.39 10^6/ΜL
SODIUM BLD-SCNC: 138 MMOL/L
SPECIFIC GRAVITY, URINE: 1.02
TROPONIN: <0.03
UROBILINOGEN, URINE: NORMAL
WBC # BLD: 11.4 10^3/ML

## 2024-05-21 NOTE — PROGRESS NOTES
Subjective:      Review of Systems   Constitutional:  Negative for chills and fever.   Respiratory:  Negative for cough and shortness of breath.    Cardiovascular:  Negative for chest pain.   Gastrointestinal:  Positive for abdominal pain (resolved). Negative for diarrhea, nausea and vomiting.   Genitourinary:  Positive for menstrual problem.   Neurological:  Positive for headaches (chronic migraines, stable).   Psychiatric/Behavioral:  Negative for dysphoric mood, self-injury and suicidal ideas. The patient is not nervous/anxious.      Objective:     Physical Exam  Vitals and nursing note reviewed.   Constitutional:       General: She is not in acute distress.     Appearance: Normal appearance. She is well-developed. She is morbidly obese. She is not ill-appearing.   HENT:      Head: Normocephalic and atraumatic.      Right Ear: Tympanic membrane, ear canal and external ear normal.      Left Ear: Tympanic membrane, ear canal and external ear normal.      Nose: Nose normal.      Mouth/Throat:      Lips: Pink.      Mouth: Mucous membranes are moist.      Pharynx: Oropharynx is clear. Uvula midline.   Eyes:      General: Lids are normal.      Conjunctiva/sclera: Conjunctivae normal.   Cardiovascular:      Rate and Rhythm: Normal rate and regular rhythm.      Heart sounds: Normal heart sounds. No murmur heard.  Pulmonary:      Effort: Pulmonary effort is normal.      Breath sounds: Normal breath sounds and air entry. No wheezing, rhonchi or rales.   Musculoskeletal:      Cervical back: Neck supple.   Lymphadenopathy:      Cervical: No cervical adenopathy.   Skin:     General: Skin is warm and dry.   Neurological:      General: No focal deficit present.      Mental Status: She is alert and oriented to person, place, and time.      Gait: Gait is intact.   Psychiatric:         Mood and Affect: Mood and affect normal.         Behavior: Behavior is cooperative.       /74 (Site: Right Upper Arm, Position:

## 2024-05-22 ENCOUNTER — OFFICE VISIT (OUTPATIENT)
Dept: FAMILY MEDICINE CLINIC | Age: 49
End: 2024-05-22
Payer: COMMERCIAL

## 2024-05-22 VITALS
HEART RATE: 88 BPM | DIASTOLIC BLOOD PRESSURE: 74 MMHG | HEIGHT: 69 IN | BODY MASS INDEX: 43.4 KG/M2 | WEIGHT: 293 LBS | SYSTOLIC BLOOD PRESSURE: 118 MMHG | OXYGEN SATURATION: 97 % | RESPIRATION RATE: 20 BRPM

## 2024-05-22 DIAGNOSIS — F41.9 ANXIETY: ICD-10-CM

## 2024-05-22 DIAGNOSIS — K76.0 FATTY LIVER: ICD-10-CM

## 2024-05-22 DIAGNOSIS — N92.0 MENORRHAGIA WITH REGULAR CYCLE: ICD-10-CM

## 2024-05-22 DIAGNOSIS — Z00.00 ENCOUNTER FOR WELLNESS EXAMINATION IN ADULT: Primary | ICD-10-CM

## 2024-05-22 DIAGNOSIS — G43.009 MIGRAINE WITHOUT AURA AND WITHOUT STATUS MIGRAINOSUS, NOT INTRACTABLE: ICD-10-CM

## 2024-05-22 DIAGNOSIS — Z13.1 DIABETES MELLITUS SCREENING: ICD-10-CM

## 2024-05-22 DIAGNOSIS — R53.82 CHRONIC FATIGUE: ICD-10-CM

## 2024-05-22 DIAGNOSIS — K21.9 GASTROESOPHAGEAL REFLUX DISEASE, UNSPECIFIED WHETHER ESOPHAGITIS PRESENT: ICD-10-CM

## 2024-05-22 DIAGNOSIS — E55.9 VITAMIN D DEFICIENCY: ICD-10-CM

## 2024-05-22 PROCEDURE — 99396 PREV VISIT EST AGE 40-64: CPT | Performed by: STUDENT IN AN ORGANIZED HEALTH CARE EDUCATION/TRAINING PROGRAM

## 2024-05-22 RX ORDER — OMEPRAZOLE 40 MG/1
40 CAPSULE, DELAYED RELEASE ORAL DAILY
Qty: 30 CAPSULE | Refills: 5 | Status: SHIPPED | OUTPATIENT
Start: 2024-05-22

## 2024-05-22 RX ORDER — HYDROCODONE BITARTRATE AND ACETAMINOPHEN 5; 325 MG/1; MG/1
TABLET ORAL
COMMUNITY
Start: 2024-05-13

## 2024-05-22 RX ORDER — CITALOPRAM 20 MG/1
20 TABLET ORAL DAILY
Qty: 30 TABLET | Refills: 5 | Status: SHIPPED | OUTPATIENT
Start: 2024-05-22

## 2024-05-22 SDOH — ECONOMIC STABILITY: FOOD INSECURITY: WITHIN THE PAST 12 MONTHS, THE FOOD YOU BOUGHT JUST DIDN'T LAST AND YOU DIDN'T HAVE MONEY TO GET MORE.: SOMETIMES TRUE

## 2024-05-22 SDOH — ECONOMIC STABILITY: FOOD INSECURITY: WITHIN THE PAST 12 MONTHS, YOU WORRIED THAT YOUR FOOD WOULD RUN OUT BEFORE YOU GOT MONEY TO BUY MORE.: SOMETIMES TRUE

## 2024-05-22 SDOH — ECONOMIC STABILITY: INCOME INSECURITY: HOW HARD IS IT FOR YOU TO PAY FOR THE VERY BASICS LIKE FOOD, HOUSING, MEDICAL CARE, AND HEATING?: SOMEWHAT HARD

## 2024-05-22 ASSESSMENT — PATIENT HEALTH QUESTIONNAIRE - PHQ9
SUM OF ALL RESPONSES TO PHQ QUESTIONS 1-9: 0
SUM OF ALL RESPONSES TO PHQ QUESTIONS 1-9: 0
2. FEELING DOWN, DEPRESSED OR HOPELESS: NOT AT ALL
SUM OF ALL RESPONSES TO PHQ QUESTIONS 1-9: 0
1. LITTLE INTEREST OR PLEASURE IN DOING THINGS: NOT AT ALL
2. FEELING DOWN, DEPRESSED OR HOPELESS: NOT AT ALL
SUM OF ALL RESPONSES TO PHQ9 QUESTIONS 1 & 2: 0
SUM OF ALL RESPONSES TO PHQ QUESTIONS 1-9: 0
1. LITTLE INTEREST OR PLEASURE IN DOING THINGS: NOT AT ALL
SUM OF ALL RESPONSES TO PHQ9 QUESTIONS 1 & 2: 0

## 2024-05-23 ASSESSMENT — ENCOUNTER SYMPTOMS
ABDOMINAL PAIN: 1
SHORTNESS OF BREATH: 0
NAUSEA: 0
DIARRHEA: 0
COUGH: 0
VOMITING: 0

## 2024-05-24 ENCOUNTER — NURSE ONLY (OUTPATIENT)
Dept: FAMILY MEDICINE CLINIC | Age: 49
End: 2024-05-24
Payer: COMMERCIAL

## 2024-05-24 DIAGNOSIS — R53.82 CHRONIC FATIGUE: Primary | ICD-10-CM

## 2024-05-24 DIAGNOSIS — R53.82 CHRONIC FATIGUE: ICD-10-CM

## 2024-05-24 DIAGNOSIS — Z13.1 DIABETES MELLITUS SCREENING: ICD-10-CM

## 2024-05-24 DIAGNOSIS — Z00.00 ENCOUNTER FOR WELLNESS EXAMINATION IN ADULT: ICD-10-CM

## 2024-05-24 DIAGNOSIS — G43.009 MIGRAINE WITHOUT AURA AND WITHOUT STATUS MIGRAINOSUS, NOT INTRACTABLE: ICD-10-CM

## 2024-05-24 DIAGNOSIS — E55.9 VITAMIN D DEFICIENCY: ICD-10-CM

## 2024-05-24 LAB
25(OH)D3 SERPL-MCNC: 36 NG/ML (ref 30–100)
CHOLEST SERPL-MCNC: 220 MG/DL (ref 100–199)
DEPRECATED MEAN GLUCOSE BLD GHB EST-ACNC: 108 MG/DL (ref 70–126)
FOLATE SERPL-MCNC: 11.5 NG/ML (ref 4.8–24.2)
HBA1C MFR BLD HPLC: 5.6 % (ref 4.4–6.4)
HDLC SERPL-MCNC: 40 MG/DL
LDLC SERPL CALC-MCNC: 138 MG/DL
TRIGL SERPL-MCNC: 208 MG/DL (ref 0–199)
TSH SERPL DL<=0.005 MIU/L-ACNC: 2.28 UIU/ML (ref 0.4–4.2)
VIT B12 SERPL-MCNC: 177 PG/ML (ref 211–911)

## 2024-05-24 PROCEDURE — 36415 COLL VENOUS BLD VENIPUNCTURE: CPT | Performed by: NURSE PRACTITIONER

## 2024-05-28 ENCOUNTER — TELEPHONE (OUTPATIENT)
Dept: FAMILY MEDICINE CLINIC | Age: 49
End: 2024-05-28

## 2024-05-28 NOTE — TELEPHONE ENCOUNTER
----- Message from Elizabeth Drake DO sent at 5/26/2024  3:35 PM EDT -----  Please let patient know that her labs showed:    1. Normal hemoglobin A1c (test for diabetes), thyroid function, and vitamin D. I would recommend continuing vitamin D supplement though because I'd prefer if her levels were closer to 50 (it is currently 36).  2. Cholesterol is slightly elevated, but not high enough to require medication for this. Continue working on healthy dietary choices and physical activity as able.  3. Vitamin B12 level is low. I would recommend that we start her on supplementation for this -- if she is open to once weekly vitamin B12 injection x 4 weeks in the office, followed by once monthly injections, I am happy to order this for her. Please let me know. Thanks    Dr. Drake

## 2024-05-29 DIAGNOSIS — E53.8 B12 DEFICIENCY: Primary | ICD-10-CM

## 2024-05-29 RX ORDER — LANOLIN ALCOHOL/MO/W.PET/CERES
1000 CREAM (GRAM) TOPICAL DAILY
Qty: 90 TABLET | Refills: 0 | Status: SHIPPED | OUTPATIENT
Start: 2024-05-29 | End: 2024-08-27

## 2024-05-29 NOTE — TELEPHONE ENCOUNTER
Called and informed patient of results.    Patient is wondering about a medication version of the B12 because she isn't sure how much insurance would cover. Amaya this be okay in replace of the injections?

## 2024-11-19 ENCOUNTER — TELEPHONE (OUTPATIENT)
Dept: FAMILY MEDICINE CLINIC | Age: 49
End: 2024-11-19

## 2024-11-19 NOTE — TELEPHONE ENCOUNTER
Called patient- no answer- left message to return call     will be out of the office on 11/20/24- patient needs rescheduled

## 2024-11-22 ENCOUNTER — OFFICE VISIT (OUTPATIENT)
Dept: FAMILY MEDICINE CLINIC | Age: 49
End: 2024-11-22

## 2024-11-22 VITALS
DIASTOLIC BLOOD PRESSURE: 80 MMHG | TEMPERATURE: 98.4 F | WEIGHT: 293 LBS | OXYGEN SATURATION: 98 % | RESPIRATION RATE: 18 BRPM | SYSTOLIC BLOOD PRESSURE: 134 MMHG | BODY MASS INDEX: 59.39 KG/M2 | HEART RATE: 88 BPM

## 2024-11-22 DIAGNOSIS — R05.1 ACUTE COUGH: Primary | ICD-10-CM

## 2024-11-22 LAB
INFLUENZA VIRUS A RNA: NEGATIVE
INFLUENZA VIRUS B RNA: NEGATIVE
Lab: NORMAL
QC PASS/FAIL: NORMAL
SARS-COV-2 RDRP RESP QL NAA+PROBE: NEGATIVE

## 2024-11-22 RX ORDER — FLUTICASONE PROPIONATE 50 MCG
SPRAY, SUSPENSION (ML) NASAL
Qty: 16 G | Refills: 0 | Status: SHIPPED | OUTPATIENT
Start: 2024-11-22

## 2024-11-22 RX ORDER — BROMPHENIRAMINE MALEATE, PSEUDOEPHEDRINE HYDROCHLORIDE, AND DEXTROMETHORPHAN HYDROBROMIDE 2; 30; 10 MG/5ML; MG/5ML; MG/5ML
10 SYRUP ORAL 4 TIMES DAILY PRN
Qty: 118 ML | Refills: 0 | Status: SHIPPED | OUTPATIENT
Start: 2024-11-22 | End: 2024-11-27

## 2024-11-22 RX ORDER — AZITHROMYCIN 250 MG/1
TABLET, FILM COATED ORAL
Qty: 6 TABLET | Refills: 0 | Status: SHIPPED | OUTPATIENT
Start: 2024-11-22 | End: 2024-11-27

## 2024-11-22 NOTE — PROGRESS NOTES
[facility]  [facility]  [address] [place] [address]  [phone]     Poncho Jasso is a 49 y.o. female who presents today for:  Chief Complaint   Patient presents with    Headache     Daily     Pharyngitis    Loss of Consciousness     At desk, works from home. Lightheaded.    Generalized Body Aches    Cough     X 5 days, productive        Assessment/Plan:     Poncho was seen today for headache, pharyngitis, loss of consciousness, generalized body aches and cough.    Diagnoses and all orders for this visit:    Acute cough  -     POCT COVID-19 Rapid, NAAT  -     POCT Influenza A/B DNA (Alere i)  -     azithromycin (ZITHROMAX) 250 MG tablet; 500mg on day 1 followed by 250mg on days 2 - 5  -     fluticasone (FLONASE) 50 MCG/ACT nasal spray; Flonase two puffs per nostril twice daily for one week then once daily for one week.  -     brompheniramine-pseudoephedrine-DM 2-30-10 MG/5ML syrup; Take 10 mLs by mouth 4 times daily as needed for Cough      Cough: Acute/uncontrolled, COVID and flu negative, will treat for atypical pneumonia as above.  Also added Flonase and Bromfed cough syrup for symptomatic control.  Otherwise supportive care recommended.  Follow-up if symptoms do not improve.           No follow-ups on file.      Medications Prescribed:  Orders Placed This Encounter   Medications    azithromycin (ZITHROMAX) 250 MG tablet     Simg on day 1 followed by 250mg on days 2 - 5     Dispense:  6 tablet     Refill:  0    fluticasone (FLONASE) 50 MCG/ACT nasal spray     Sig: Flonase two puffs per nostril twice daily for one week then once daily for one week.     Dispense:  16 g     Refill:  0    brompheniramine-pseudoephedrine-DM 2-30-10 MG/5ML syrup     Sig: Take 10 mLs by mouth 4 times daily as needed for Cough     Dispense:  118 mL     Refill:  0       Future Appointments   Date Time Provider Department Center   2024  2:20 PM Elizabeth Drake,

## 2024-11-27 ENCOUNTER — OFFICE VISIT (OUTPATIENT)
Dept: FAMILY MEDICINE CLINIC | Age: 49
End: 2024-11-27

## 2024-11-27 VITALS
BODY MASS INDEX: 43.4 KG/M2 | DIASTOLIC BLOOD PRESSURE: 84 MMHG | SYSTOLIC BLOOD PRESSURE: 122 MMHG | HEIGHT: 69 IN | OXYGEN SATURATION: 95 % | RESPIRATION RATE: 20 BRPM | WEIGHT: 293 LBS | HEART RATE: 90 BPM

## 2024-11-27 DIAGNOSIS — E78.2 MIXED HYPERLIPIDEMIA: ICD-10-CM

## 2024-11-27 DIAGNOSIS — R79.89 LOW VITAMIN B12 LEVEL: Primary | ICD-10-CM

## 2024-11-27 DIAGNOSIS — E55.9 VITAMIN D DEFICIENCY: ICD-10-CM

## 2024-11-27 DIAGNOSIS — F41.9 ANXIETY: ICD-10-CM

## 2024-11-27 DIAGNOSIS — Z23 INFLUENZA VACCINE NEEDED: ICD-10-CM

## 2024-11-27 DIAGNOSIS — K21.9 GASTROESOPHAGEAL REFLUX DISEASE, UNSPECIFIED WHETHER ESOPHAGITIS PRESENT: ICD-10-CM

## 2024-11-27 RX ORDER — OMEPRAZOLE 40 MG/1
40 CAPSULE, DELAYED RELEASE ORAL DAILY
Qty: 30 CAPSULE | Refills: 5 | Status: SHIPPED | OUTPATIENT
Start: 2024-11-27

## 2024-11-27 RX ORDER — CITALOPRAM HYDROBROMIDE 20 MG/1
20 TABLET ORAL DAILY
Qty: 30 TABLET | Refills: 5 | Status: SHIPPED | OUTPATIENT
Start: 2024-11-27

## 2024-11-27 ASSESSMENT — ENCOUNTER SYMPTOMS
DIARRHEA: 0
COUGH: 1
SORE THROAT: 1
NAUSEA: 0
VOMITING: 0
CONSTIPATION: 0
SINUS PRESSURE: 0
COUGH: 1
SHORTNESS OF BREATH: 0
RHINORRHEA: 0
ABDOMINAL PAIN: 0
NAUSEA: 0
VOMITING: 0
SINUS PAIN: 0
SHORTNESS OF BREATH: 0

## 2024-11-27 NOTE — PROGRESS NOTES
After obtaining consent, and per orders of Dr. Drake, injection of Flucelvax given in Right deltoid by Suha Bean MA. Patient instructed to remain in clinic for 20 minutes afterwards, and to report any adverse reaction to me immediately.    Immunizations Administered       Name Date Dose Route    Influenza, FLUCELVAX, (age 6 mo+) IM, Trivalent PF, 0.5mL 11/27/2024 0.5 mL Intramuscular    Site: Deltoid- Right    Lot: [id number]    NDC: 62324-325-40            Pt tolerated injection well. VIS given to pt, vaccine checklist completed.   
unexpected weight change.   Respiratory:  Positive for cough (improved). Negative for shortness of breath.    Cardiovascular:  Negative for chest pain.   Gastrointestinal:  Negative for abdominal pain, nausea and vomiting.   Neurological:  Positive for headaches. Negative for dizziness and light-headedness.   Psychiatric/Behavioral:  Negative for dysphoric mood, self-injury and suicidal ideas. The patient is not nervous/anxious (stable).      Objective:     Physical Exam  Vitals and nursing note reviewed.   Constitutional:       General: She is not in acute distress.     Appearance: Normal appearance. She is well-developed. She is morbidly obese. She is not ill-appearing.   HENT:      Head: Normocephalic and atraumatic.      Right Ear: Tympanic membrane, ear canal and external ear normal.      Left Ear: Tympanic membrane, ear canal and external ear normal.      Nose: Nose normal.      Mouth/Throat:      Lips: Pink.      Mouth: Mucous membranes are moist.      Pharynx: Oropharynx is clear. Uvula midline.   Eyes:      General: Lids are normal.      Conjunctiva/sclera: Conjunctivae normal.   Cardiovascular:      Rate and Rhythm: Normal rate and regular rhythm.      Heart sounds: Normal heart sounds. No murmur heard.  Pulmonary:      Effort: Pulmonary effort is normal.      Breath sounds: Normal breath sounds and air entry. No wheezing, rhonchi or rales.   Neurological:      General: No focal deficit present.      Mental Status: She is alert and oriented to person, place, and time.      Gait: Gait is intact.   Psychiatric:         Mood and Affect: Mood and affect normal.         Behavior: Behavior is cooperative.       /84 (Site: Left Upper Arm, Position: Sitting)   Pulse 90   Resp 20   Ht 1.753 m (5' 9\")   Wt (!) 182.3 kg (402 lb)   LMP 10/05/2024   SpO2 95%   BMI 59.37 kg/m²     Assessment/Plan:   Poncho was seen today for 6 month follow-up.    Diagnoses and all orders for this visit:    Low vitamin B12

## 2024-12-12 ENCOUNTER — LAB (OUTPATIENT)
Dept: FAMILY MEDICINE CLINIC | Age: 49
End: 2024-12-12
Payer: COMMERCIAL

## 2024-12-12 ENCOUNTER — OFFICE VISIT (OUTPATIENT)
Dept: FAMILY MEDICINE CLINIC | Age: 49
End: 2024-12-12

## 2024-12-12 VITALS
HEART RATE: 85 BPM | BODY MASS INDEX: 59.93 KG/M2 | SYSTOLIC BLOOD PRESSURE: 128 MMHG | OXYGEN SATURATION: 99 % | RESPIRATION RATE: 18 BRPM | DIASTOLIC BLOOD PRESSURE: 82 MMHG | WEIGHT: 293 LBS

## 2024-12-12 DIAGNOSIS — R79.89 LOW VITAMIN B12 LEVEL: ICD-10-CM

## 2024-12-12 DIAGNOSIS — R09.81 CONGESTION OF NASAL SINUS: Primary | ICD-10-CM

## 2024-12-12 PROCEDURE — 36415 COLL VENOUS BLD VENIPUNCTURE: CPT | Performed by: STUDENT IN AN ORGANIZED HEALTH CARE EDUCATION/TRAINING PROGRAM

## 2024-12-12 ASSESSMENT — ENCOUNTER SYMPTOMS
NAUSEA: 0
CONSTIPATION: 0
SHORTNESS OF BREATH: 0
VOMITING: 0
COUGH: 0
DIARRHEA: 0
SORE THROAT: 1

## 2024-12-12 NOTE — PROGRESS NOTES
breath.    Cardiovascular:  Negative for chest pain and leg swelling.   Gastrointestinal:  Negative for constipation, diarrhea, nausea and vomiting.   Genitourinary:  Negative for decreased urine volume and dysuria.   Musculoskeletal:  Positive for myalgias.   Neurological:  Positive for headaches. Negative for dizziness.         Objective:     Vitals:    12/12/24 1306   BP: 128/82   Site: Left Upper Arm   Position: Sitting   Pulse: 85   Resp: 18   SpO2: 99%   Weight: (!) 184.1 kg (405 lb 12.8 oz)       Body mass index is 59.93 kg/m².    Wt Readings from Last 3 Encounters:   12/12/24 (!) 184.1 kg (405 lb 12.8 oz)   11/27/24 (!) 182.3 kg (402 lb)   11/22/24 (!) 182.4 kg (402 lb 3.2 oz)     BP Readings from Last 3 Encounters:   12/12/24 128/82   11/27/24 122/84   11/22/24 134/80       Physical Exam  Constitutional:       Appearance: Normal appearance. She is obese.   HENT:      Head: Normocephalic and atraumatic.      Right Ear: Tympanic membrane, ear canal and external ear normal.      Left Ear: Tympanic membrane, ear canal and external ear normal.      Nose: Congestion present.      Mouth/Throat:      Mouth: Mucous membranes are moist.      Pharynx: Posterior oropharyngeal erythema present. No oropharyngeal exudate.   Eyes:      Conjunctiva/sclera: Conjunctivae normal.   Cardiovascular:      Rate and Rhythm: Normal rate and regular rhythm.      Pulses: Normal pulses.      Heart sounds: Normal heart sounds.   Pulmonary:      Effort: Pulmonary effort is normal.      Breath sounds: Normal breath sounds.   Lymphadenopathy:      Cervical: No cervical adenopathy.   Neurological:      Mental Status: She is alert.           Patient given educational materials - see patient instructions.  Discussed use, benefit, and sideeffects of prescribed medications.  All patient questions answered.  Pt voiced understanding. Reviewed health maintenance.  Instructed to continue current medications, diet and exercise.  Patient agreed with

## 2024-12-12 NOTE — PATIENT INSTRUCTIONS
Flonase two puffs per nostril twice daily for one week then once daily for one week.     1000mg tylenol every 8 hours.     800mg of ibuprofen/advil every 8 hours.

## 2024-12-13 LAB — VIT B12 SERPL-MCNC: 194 PG/ML (ref 211–911)

## 2024-12-16 ENCOUNTER — TELEPHONE (OUTPATIENT)
Dept: FAMILY MEDICINE CLINIC | Age: 49
End: 2024-12-16

## 2024-12-16 NOTE — TELEPHONE ENCOUNTER
----- Message from Dr. Elizabeth Drake, DO sent at 12/13/2024  6:41 AM EST -----  Please let patient know that her vitamin B12 level is mildly improved but still low. Is she open to vitamin B12 injections temporarily? She would get the prescription and then come to our office to have the injections given. Please let me know if she is open to this and I can place order. Thanks    Dr. Drake

## 2024-12-18 NOTE — TELEPHONE ENCOUNTER
Pt states she is dealing with having an infected tooth pulled. Will deal with this after Adriane, was notified to just give the office a call.

## 2024-12-26 ENCOUNTER — TELEPHONE (OUTPATIENT)
Dept: FAMILY MEDICINE CLINIC | Age: 49
End: 2024-12-26

## 2024-12-26 NOTE — TELEPHONE ENCOUNTER
Pt called stating that she would like to start vitamin b12 injections. [facility] in [place].

## 2024-12-27 DIAGNOSIS — E53.8 B12 DEFICIENCY: ICD-10-CM

## 2024-12-27 RX ORDER — CYANOCOBALAMIN 1000 UG/ML
1000 INJECTION, SOLUTION INTRAMUSCULAR; SUBCUTANEOUS WEEKLY
Qty: 4 EACH | Refills: 0 | Status: SHIPPED | OUTPATIENT
Start: 2024-12-27

## 2025-01-09 ENCOUNTER — LAB (OUTPATIENT)
Dept: FAMILY MEDICINE CLINIC | Age: 50
End: 2025-01-09

## 2025-01-09 DIAGNOSIS — E53.8 B12 DEFICIENCY: Primary | ICD-10-CM

## 2025-01-09 RX ORDER — CYANOCOBALAMIN 1000 UG/ML
1000 INJECTION, SOLUTION INTRAMUSCULAR; SUBCUTANEOUS ONCE
Status: COMPLETED | OUTPATIENT
Start: 2025-01-09 | End: 2025-01-09

## 2025-01-09 RX ADMIN — CYANOCOBALAMIN 1000 MCG: 1000 INJECTION, SOLUTION INTRAMUSCULAR; SUBCUTANEOUS at 15:44

## 2025-01-09 NOTE — PROGRESS NOTES
Medication(s) given during visit:    Administrations This Visit       cyanocobalamin injection 1,000 mcg       Admin Date  01/09/2025  15:44 Action  Given Dose  1,000 mcg Route  IntraMUSCular Site  Dorsogluteal Right Documented By  Suha Bean MA    NDC: 7285-7165-52    Lot#: [id number]    : [employer]    Patient Supplied?: Yes                    Patient instructed to remain in clinic for 20 minutes after injection and was advised to report any adverse reaction to me immediately.

## 2025-01-23 ENCOUNTER — LAB (OUTPATIENT)
Dept: FAMILY MEDICINE CLINIC | Age: 50
End: 2025-01-23
Payer: COMMERCIAL

## 2025-01-23 DIAGNOSIS — E53.8 B12 DEFICIENCY: ICD-10-CM

## 2025-01-23 PROCEDURE — 36415 COLL VENOUS BLD VENIPUNCTURE: CPT | Performed by: NURSE PRACTITIONER

## 2025-01-23 RX ORDER — CYANOCOBALAMIN 1000 UG/ML
1000 INJECTION, SOLUTION INTRAMUSCULAR; SUBCUTANEOUS ONCE
Status: COMPLETED | OUTPATIENT
Start: 2025-01-23 | End: 2025-01-23

## 2025-01-23 RX ADMIN — CYANOCOBALAMIN 1000 MCG: 1000 INJECTION, SOLUTION INTRAMUSCULAR; SUBCUTANEOUS at 14:36

## 2025-01-23 NOTE — PROGRESS NOTES
Administrations This Visit       cyanocobalamin injection 1,000 mcg       Admin Date  01/23/2025  14:36 Action  Given Dose  1,000 mcg Route  IntraMUSCular Site  Dorsogluteal Left Documented By  Urmila Antunez MA    NDC: 3726-7560-21    Lot#: [id number]    : [employer]ICAN [employer]    Patient Supplied?: Yes                    Patient instructed to remain in clinic for 20 minutes after injection and was advised to report any adverse reaction to me immediately.      Patient supplied

## 2025-01-24 LAB — VIT B12 SERPL-MCNC: 236 PG/ML (ref 211–911)

## 2025-01-27 ENCOUNTER — TELEPHONE (OUTPATIENT)
Dept: FAMILY MEDICINE CLINIC | Age: 50
End: 2025-01-27

## 2025-01-27 NOTE — TELEPHONE ENCOUNTER
----- Message from Dr. Elizabeth Drake, DO sent at 1/27/2025 12:29 PM EST -----  Please let patient know that her vitamin B12 is now in normal range, but still on the lower end of normal.  Has she completed her vitamin B12 injections that were prescribed?    Elizabeth Drake, DO

## 2025-01-30 ENCOUNTER — LAB (OUTPATIENT)
Dept: FAMILY MEDICINE CLINIC | Age: 50
End: 2025-01-30

## 2025-01-30 DIAGNOSIS — R79.89 LOW VITAMIN B12 LEVEL: Primary | ICD-10-CM

## 2025-01-30 RX ORDER — CYANOCOBALAMIN 1000 UG/ML
1000 INJECTION, SOLUTION INTRAMUSCULAR; SUBCUTANEOUS ONCE
Status: COMPLETED | OUTPATIENT
Start: 2025-01-30 | End: 2025-01-30

## 2025-01-30 RX ADMIN — CYANOCOBALAMIN 1000 MCG: 1000 INJECTION, SOLUTION INTRAMUSCULAR; SUBCUTANEOUS at 14:06

## 2025-01-30 NOTE — PROGRESS NOTES
Medication(s) given during visit:    Administrations This Visit       cyanocobalamin injection 1,000 mcg       Admin Date  01/30/2025  14:06 Action  Given Dose  1,000 mcg Route  IntraMUSCular Site  Dorsogluteal Right Documented By  uSha Bean MA    NDC: 5767-9534-50    Lot#: [id number]    : AMERICAN [employer]RADHA    Patient Supplied?: Yes                    Patient instructed to remain in clinic for 20 minutes after injection and was advised to report any adverse reaction to me immediately.

## 2025-03-05 ENCOUNTER — TELEPHONE (OUTPATIENT)
Dept: FAMILY MEDICINE CLINIC | Age: 50
End: 2025-03-05

## 2025-03-05 ENCOUNTER — OFFICE VISIT (OUTPATIENT)
Dept: FAMILY MEDICINE CLINIC | Age: 50
End: 2025-03-05
Payer: COMMERCIAL

## 2025-03-05 VITALS
HEIGHT: 69 IN | OXYGEN SATURATION: 98 % | WEIGHT: 293 LBS | BODY MASS INDEX: 43.4 KG/M2 | HEART RATE: 86 BPM | SYSTOLIC BLOOD PRESSURE: 126 MMHG | DIASTOLIC BLOOD PRESSURE: 80 MMHG | RESPIRATION RATE: 16 BRPM

## 2025-03-05 DIAGNOSIS — R79.89 LOW VITAMIN B12 LEVEL: ICD-10-CM

## 2025-03-05 DIAGNOSIS — R11.0 NAUSEA: Primary | ICD-10-CM

## 2025-03-05 PROCEDURE — 99213 OFFICE O/P EST LOW 20 MIN: CPT | Performed by: STUDENT IN AN ORGANIZED HEALTH CARE EDUCATION/TRAINING PROGRAM

## 2025-03-05 PROCEDURE — 96372 THER/PROPH/DIAG INJ SC/IM: CPT | Performed by: STUDENT IN AN ORGANIZED HEALTH CARE EDUCATION/TRAINING PROGRAM

## 2025-03-05 RX ORDER — NORETHINDRONE ACETATE AND ETHINYL ESTRADIOL 1MG-20(21)
KIT ORAL
COMMUNITY
Start: 2025-02-27

## 2025-03-05 RX ORDER — CYANOCOBALAMIN 1000 UG/ML
1000 INJECTION, SOLUTION INTRAMUSCULAR; SUBCUTANEOUS ONCE
Status: COMPLETED | OUTPATIENT
Start: 2025-03-05 | End: 2025-03-05

## 2025-03-05 RX ORDER — ONDANSETRON 4 MG/1
4 TABLET, FILM COATED ORAL EVERY 8 HOURS PRN
Qty: 15 TABLET | Refills: 0 | Status: SHIPPED | OUTPATIENT
Start: 2025-03-05

## 2025-03-05 RX ADMIN — CYANOCOBALAMIN 1000 MCG: 1000 INJECTION, SOLUTION INTRAMUSCULAR; SUBCUTANEOUS at 15:10

## 2025-03-05 SDOH — ECONOMIC STABILITY: FOOD INSECURITY: WITHIN THE PAST 12 MONTHS, THE FOOD YOU BOUGHT JUST DIDN'T LAST AND YOU DIDN'T HAVE MONEY TO GET MORE.: NEVER TRUE

## 2025-03-05 SDOH — ECONOMIC STABILITY: FOOD INSECURITY: WITHIN THE PAST 12 MONTHS, YOU WORRIED THAT YOUR FOOD WOULD RUN OUT BEFORE YOU GOT MONEY TO BUY MORE.: NEVER TRUE

## 2025-03-05 ASSESSMENT — PATIENT HEALTH QUESTIONNAIRE - PHQ9
SUM OF ALL RESPONSES TO PHQ QUESTIONS 1-9: 0
2. FEELING DOWN, DEPRESSED OR HOPELESS: NOT AT ALL
1. LITTLE INTEREST OR PLEASURE IN DOING THINGS: NOT AT ALL

## 2025-03-05 NOTE — TELEPHONE ENCOUNTER
Elizabeth Drake,   [facility] Clinical Staff  Can you schedule patient for an ED follow up? We can discuss further workup of her chest pain. Thanks    Dr. Drake

## 2025-03-05 NOTE — PROGRESS NOTES
Medication(s) given during visit:    Administrations This Visit       cyanocobalamin injection 1,000 mcg       Admin Date  03/05/2025  15:10 Action  Given Dose  1,000 mcg Route  IntraMUSCular Site  Dorsogluteal Right Documented By  Fredy Marrero LPN    NDC: 7125-1215-82    Lot#: [id number]    : AMERICAN [employer]    Patient Supplied?: Yes                    Patient instructed to remain in clinic for 20 minutes after injection and was advised to report any adverse reaction to me immediately.    
DECOMPRESSION BICEP TENOTOMY,  DEBRIDEMENT OF ROTATOR CUFF performed by Jt Chapman DO at [facility] OR    SKIN BIOPSY  1998    R forearm    UPPER GASTROINTESTINAL ENDOSCOPY  , 17    Dr. Harkins       Family History   Problem Relation Age of Onset    Arthritis Mother     Asthma Mother     Allergy (Severe) Mother     Cancer Paternal Aunt     Arthritis Maternal Grandmother     Depression Maternal Grandmother     Hearing Loss Maternal Grandmother     Heart Disease Maternal Grandmother     High Blood Pressure Maternal Grandmother     High Cholesterol Maternal Grandmother     Kidney Disease Maternal Grandmother     Cancer Maternal Grandfather     Cancer Paternal Grandmother     Arthritis Paternal Grandfather     Cancer Paternal Grandfather     Diabetes Paternal Grandfather     Miscarriages / Stillbirths Maternal Cousin     High Blood Pressure Father     Atrial Fibrillation Father     Atrial Fibrillation Brother     Diabetes Maternal Aunt     Hearing Loss Maternal Aunt     Heart Attack Maternal Aunt        Social History     Tobacco Use    Smoking status: Former     Current packs/day: 0.00     Average packs/day: 1 pack/day for 1 year (1.0 ttl pk-yrs)     Types: Cigarettes     Start date: 2014     Quit date: 2015     Years since quittin.2     Passive exposure: Past    Smokeless tobacco: Never   Substance Use Topics    Alcohol use: Yes     Comment: rare      Current Outpatient Medications   Medication Sig Dispense Refill    norethindrone-ethinyl estradiol (JUNEL FE 1/20) 1-20 MG-MCG per tablet       ondansetron (ZOFRAN) 4 MG tablet Take 1 tablet by mouth every 8 hours as needed for Nausea or Vomiting 15 tablet 0    citalopram (CELEXA) 20 MG tablet Take 1 tablet by mouth daily 30 tablet 5    omeprazole (PRILOSEC) 40 MG delayed release capsule Take 1 capsule by mouth daily 30 capsule 5    fluticasone (FLONASE) 50 MCG/ACT nasal spray Flonase two puffs per nostril twice daily for one week then once daily

## 2025-03-06 ASSESSMENT — ENCOUNTER SYMPTOMS
CONSTIPATION: 0
NAUSEA: 1
SHORTNESS OF BREATH: 0
ABDOMINAL PAIN: 0
WHEEZING: 0
SORE THROAT: 0
COUGH: 0
DIARRHEA: 0
VOMITING: 0
RHINORRHEA: 0

## 2025-03-24 ENCOUNTER — OFFICE VISIT (OUTPATIENT)
Dept: FAMILY MEDICINE CLINIC | Age: 50
End: 2025-03-24
Payer: COMMERCIAL

## 2025-03-24 VITALS
OXYGEN SATURATION: 97 % | SYSTOLIC BLOOD PRESSURE: 122 MMHG | WEIGHT: 293 LBS | HEIGHT: 69 IN | HEART RATE: 76 BPM | RESPIRATION RATE: 16 BRPM | BODY MASS INDEX: 43.4 KG/M2 | DIASTOLIC BLOOD PRESSURE: 78 MMHG

## 2025-03-24 DIAGNOSIS — E53.8 B12 DEFICIENCY: ICD-10-CM

## 2025-03-24 DIAGNOSIS — Z11.3 SCREEN FOR STD (SEXUALLY TRANSMITTED DISEASE): ICD-10-CM

## 2025-03-24 DIAGNOSIS — R11.0 NAUSEA: Primary | ICD-10-CM

## 2025-03-24 DIAGNOSIS — L30.4 INTERTRIGO: ICD-10-CM

## 2025-03-24 LAB
B-HCG SERPL QL: NEGATIVE
BASOPHILS ABSOLUTE: 0 THOU/MM3 (ref 0–0.1)
BASOPHILS NFR BLD AUTO: 0.4 %
DEPRECATED RDW RBC AUTO: 47.5 FL (ref 35–45)
EOSINOPHIL NFR BLD AUTO: 1.7 %
EOSINOPHILS ABSOLUTE: 0.2 THOU/MM3 (ref 0–0.4)
ERYTHROCYTE [DISTWIDTH] IN BLOOD BY AUTOMATED COUNT: 13.9 % (ref 11.5–14.5)
HCT VFR BLD AUTO: 42.2 % (ref 37–47)
HCV IGG SERPL QL IA: NONREACTIVE
HGB BLD-MCNC: 13.4 GM/DL (ref 12–16)
IMM GRANULOCYTES # BLD AUTO: 0.03 THOU/MM3 (ref 0–0.07)
IMM GRANULOCYTES NFR BLD AUTO: 0.3 %
LYMPHOCYTES ABSOLUTE: 3.4 THOU/MM3 (ref 1–4.8)
LYMPHOCYTES NFR BLD AUTO: 31.1 %
MCH RBC QN AUTO: 29.6 PG (ref 26–33)
MCHC RBC AUTO-ENTMCNC: 31.8 GM/DL (ref 32.2–35.5)
MCV RBC AUTO: 93.2 FL (ref 81–99)
MONOCYTES ABSOLUTE: 0.6 THOU/MM3 (ref 0.4–1.3)
MONOCYTES NFR BLD AUTO: 5.1 %
NEUTROPHILS ABSOLUTE: 6.8 THOU/MM3 (ref 1.8–7.7)
NEUTROPHILS NFR BLD AUTO: 61.4 %
NRBC BLD AUTO-RTO: 0 /100 WBC
PLATELET # BLD AUTO: 441 THOU/MM3 (ref 130–400)
PMV BLD AUTO: 10.3 FL (ref 9.4–12.4)
RBC # BLD AUTO: 4.53 MILL/MM3 (ref 4.2–5.4)
WBC # BLD AUTO: 11 THOU/MM3 (ref 4.8–10.8)

## 2025-03-24 PROCEDURE — 99214 OFFICE O/P EST MOD 30 MIN: CPT | Performed by: STUDENT IN AN ORGANIZED HEALTH CARE EDUCATION/TRAINING PROGRAM

## 2025-03-24 PROCEDURE — 36415 COLL VENOUS BLD VENIPUNCTURE: CPT | Performed by: STUDENT IN AN ORGANIZED HEALTH CARE EDUCATION/TRAINING PROGRAM

## 2025-03-24 RX ORDER — PROMETHAZINE HYDROCHLORIDE 25 MG/1
25 TABLET ORAL EVERY 8 HOURS PRN
Qty: 21 TABLET | Refills: 0 | Status: SHIPPED | OUTPATIENT
Start: 2025-03-24 | End: 2025-03-31

## 2025-03-24 RX ORDER — NYSTATIN 100000 [USP'U]/G
POWDER TOPICAL
Qty: 30 G | Refills: 0 | Status: SHIPPED | OUTPATIENT
Start: 2025-03-24

## 2025-03-24 ASSESSMENT — ENCOUNTER SYMPTOMS
SHORTNESS OF BREATH: 0
CONSTIPATION: 0
COUGH: 0
NAUSEA: 1
DIARRHEA: 0
VOMITING: 0
ABDOMINAL PAIN: 0

## 2025-03-24 NOTE — PROGRESS NOTES
[facility] PROFESSION[facility]  [facility]  [address] [address]  Dept: [phone]  Dept Fax: [phone]  Loc: [phone]    Poncho Jasso is a 49 y.o. female who presents today for her medical conditions/complaints as noted below.     Chief Complaint   Patient presents with    Nausea     Pt states she is still having sx     Other     Would lek a full STD panel- states person she has been seeing was cheating and would like checked out     Rash     Yeast rash under breast        HPI:     Patient presents to the office today for multiple concerns.    States that she has still been having ongoing nausea every day.  Nausea and low appetite started about 3-4 weeks ago and has not improved.  She was initially seen in the ER for this and workup was unremarkable.  At last visit, she was given Zofran for concerns of viral illness, but states that this only helps temporarily/occasionally.  Still having a lot of nausea, and has also noticed increased burping/belching.  Denies significant abdominal pain, but has had lower abdominal cramping related to her current menstrual period.  She is taking OCPs for period regulation through OB/GYN, tried skipping her recent period, but still had her menstrual cycle as normal.  She typically does not have heavier cramping like this.    Patient also reports that she has a red rash under her right breast over the last few days.  States that this has had a yeasty smell and is similar to previous yeast infections that she has had.  Tried OTC powder without significant relief.    Reports that she has been with a new partner over the last 1 month.  Had unprotected intercourse about 9 days ago.  Found out that her partner has been cheating on her and would like to have STD screening performed.  Denies abnormal vaginal discharge or other new symptoms.      Past Medical History:   Diagnosis Date    Asthma     seasonal    Blood circulation,

## 2025-03-25 ENCOUNTER — RESULTS FOLLOW-UP (OUTPATIENT)
Dept: FAMILY MEDICINE CLINIC | Age: 50
End: 2025-03-25

## 2025-03-25 DIAGNOSIS — D75.839 THROMBOCYTOSIS: ICD-10-CM

## 2025-03-25 DIAGNOSIS — D72.829 LEUKOCYTOSIS, UNSPECIFIED TYPE: Primary | ICD-10-CM

## 2025-03-25 LAB
CHLAMYDIA DNA UR QL NAA+PROBE: NEGATIVE
CHLAMYDIA, GC DNA AMP, URINE: NORMAL
HIV 1+2 AB+HIV1 P24 AG SERPL QL IA: NORMAL
N GONORRHOEA DNA UR QL NAA+PROBE: NEGATIVE
RPR SER QL: NONREACTIVE

## 2025-03-26 ENCOUNTER — RESULTS FOLLOW-UP (OUTPATIENT)
Dept: FAMILY MEDICINE CLINIC | Age: 50
End: 2025-03-26

## 2025-03-26 LAB
SOURCE: NORMAL
TRICHOMONAS VAGINALI, MOLECULAR: NEGATIVE

## 2025-03-28 ENCOUNTER — HOSPITAL ENCOUNTER (EMERGENCY)
Age: 50
Discharge: HOME | End: 2025-03-28
Payer: COMMERCIAL

## 2025-03-28 VITALS
DIASTOLIC BLOOD PRESSURE: 84 MMHG | HEIGHT: 69 IN | SYSTOLIC BLOOD PRESSURE: 135 MMHG | HEART RATE: 100 BPM | OXYGEN SATURATION: 97 % | BODY MASS INDEX: 43.4 KG/M2 | WEIGHT: 293 LBS | TEMPERATURE: 97.3 F | RESPIRATION RATE: 16 BRPM

## 2025-03-28 DIAGNOSIS — N30.00 ACUTE CYSTITIS WITHOUT HEMATURIA: Primary | ICD-10-CM

## 2025-03-28 LAB
BILIRUB UR STRIP.AUTO-MCNC: ABNORMAL MG/DL
CHARACTER UR: CLEAR
COLOR, UA: YELLOW
GLUCOSE UR QL STRIP.AUTO: NEGATIVE MG/DL
KETONES UR QL STRIP.AUTO: 40
NITRITE UR QL STRIP.AUTO: NEGATIVE
PH UR STRIP.AUTO: 7 [PH] (ref 5–9)
PROT UR STRIP.AUTO-MCNC: 100 MG/DL
RBC #/AREA URNS HPF: ABNORMAL /[HPF]
SP GR UR STRIP.AUTO: 1.02 (ref 1–1.03)
UROBILINOGEN, URINE: 0.2 EU/DL (ref 0.2–1)
WBC #/AREA URNS HPF: ABNORMAL /[HPF]

## 2025-03-28 PROCEDURE — 99214 OFFICE O/P EST MOD 30 MIN: CPT

## 2025-03-28 PROCEDURE — 99213 OFFICE O/P EST LOW 20 MIN: CPT

## 2025-03-28 PROCEDURE — 81003 URINALYSIS AUTO W/O SCOPE: CPT

## 2025-03-28 PROCEDURE — 87086 URINE CULTURE/COLONY COUNT: CPT

## 2025-03-28 RX ORDER — NITROFURANTOIN 25; 75 MG/1; MG/1
100 CAPSULE ORAL 2 TIMES DAILY
Qty: 10 CAPSULE | Refills: 0 | Status: SHIPPED | OUTPATIENT
Start: 2025-03-28 | End: 2025-04-02

## 2025-03-28 ASSESSMENT — PAIN DESCRIPTION - LOCATION: LOCATION: OTHER (COMMENT)

## 2025-03-28 ASSESSMENT — ENCOUNTER SYMPTOMS
EYES NEGATIVE: 1
DIARRHEA: 0
ABDOMINAL PAIN: 0
RESPIRATORY NEGATIVE: 1
NAUSEA: 1
VOMITING: 0
ALLERGIC/IMMUNOLOGIC NEGATIVE: 1

## 2025-03-28 ASSESSMENT — PAIN DESCRIPTION - DESCRIPTORS: DESCRIPTORS: DISCOMFORT

## 2025-03-28 ASSESSMENT — PAIN - FUNCTIONAL ASSESSMENT: PAIN_FUNCTIONAL_ASSESSMENT: 0-10

## 2025-03-28 NOTE — ED TRIAGE NOTES
Pt c/o urinary frequency and dysuria x 24 hours and concerned she has a UTI. Pt has been having issues with nausea for a month and will be having lab testing and endoscopy next week in regards to nausea. Pt states she is not here for nausea today only for UTI symptoms.

## 2025-03-28 NOTE — DISCHARGE INSTRUCTIONS
Medications as prescribed.  Increase fluid intake.  Can take over-the-counter Motrin or Tylenol as needed for pain and fever.  Follow-up with family doctor in 3 to 5 days.  Go to the emergency room for any abdominal pain, flank pain or any new or worsening symptoms.

## 2025-03-28 NOTE — ED PROVIDER NOTES
[facility] URGENT CARE  Urgent Care Encounter       CHIEF COMPLAINT       Chief Complaint   Patient presents with    Urinary Frequency    Dysuria     Pt concerned she has a UTI onset x 24 hours       Nurses Notes reviewed and I agree except as noted in the HPI.  HISTORY OF PRESENT ILLNESS   Poncho Jasso is a 49 y.o. female who presents to urgent care for fever, dysuria and urinary frequency.  Symptoms started 1 day ago.  Denies over-the-counter medications.  States nausea has been ongoing and is following with gastroenterology.    The history is provided by the patient. No  was used.       REVIEW OF SYSTEMS     Review of Systems   Constitutional: Negative.    HENT: Negative.     Eyes: Negative.    Respiratory: Negative.     Cardiovascular: Negative.    Gastrointestinal:  Positive for nausea. Negative for abdominal pain, diarrhea and vomiting.   Endocrine: Negative.    Genitourinary:  Positive for dysuria and frequency.   Musculoskeletal: Negative.    Skin: Negative.    Allergic/Immunologic: Negative.    Neurological: Negative.    Hematological: Negative.    Psychiatric/Behavioral: Negative.         PAST MEDICAL HISTORY         Diagnosis Date    Asthma     seasonal    Blood circulation, collateral 1995    soccer injury    Ana Maria Fry infection     Gastro-oesophageal reflux disease with hiatal hernia     Headache     Irritable bowel syndrome     Pneumonia        SURGICALHISTORY     Patient  has a past surgical history that includes Cholecystectomy (1998); Endoscopy, colon, diagnostic (1998); skin biopsy (1998); Colonoscopy (2012); Upper gastrointestinal endoscopy (2012, 8/2/16); Breast surgery (Left, 2005); cyst removal; and Shoulder arthroscopy (Right, 5/27/2021).    CURRENT MEDICATIONS       Previous Medications    AIMOVIG 70 MG/ML SOAJ        ALBUTEROL SULFATE  (90 BASE) MCG/ACT INHALER    Inhale 2 puffs into the lungs every 4 hours as needed for Wheezing or Shortness of Breath     Conjunctiva/sclera: Conjunctivae normal.   Cardiovascular:      Rate and Rhythm: Normal rate and regular rhythm.      Heart sounds: Normal heart sounds.   Pulmonary:      Effort: Pulmonary effort is normal.      Breath sounds: Normal breath sounds.   Abdominal:      General: Abdomen is protuberant. Bowel sounds are normal.      Palpations: Abdomen is soft. There is no hepatomegaly or splenomegaly.      Tenderness: There is no abdominal tenderness. There is no right CVA tenderness, left CVA tenderness, guarding or rebound.      Hernia: No hernia is present.   Musculoskeletal:         General: Normal range of motion.      Cervical back: Normal range of motion.   Skin:     General: Skin is warm and dry.      Capillary Refill: Capillary refill takes less than 2 seconds.   Neurological:      General: No focal deficit present.      Mental Status: She is alert and oriented to person, place, and time.   Psychiatric:         Mood and Affect: Mood normal.         Behavior: Behavior normal.         DIAGNOSTIC RESULTS     Labs:  Results for orders placed or performed during the hospital encounter of 03/28/25   Urinalysis   Result Value Ref Range    Glucose, Ur Negative NEGATIVE mg/dl    Bilirubin, Urine Small (A) NEGATIVE    Ketones, Urine 40 NEGATIVE    Specific Gravity, UA 1.020 1.002 - 1.030    Blood, Urine Trace-lysed NEGATIVE    pH, Urine 7.00 5.0 - 9.0    Protein,  (A) NEGATIVE mg/dl    Urobilinogen, Urine 0.20 0.2 - 1.0 eu/dl    Nitrite, Urine Negative NEGATIVE    Leukocyte Esterase, Urine Trace (A) NEGATIVE    Color, UA Yellow STRAW-YELLOW    Character, Urine Clear CLEAR-SL CLOUD       IMAGING:    No orders to display         EKG:      URGENT CARE COURSE:     Vitals:    03/28/25 1539   BP: 135/84   Pulse: 100   Resp: 16   Temp: 97.3 °F (36.3 °C)   TempSrc: Temporal   SpO2: 97%   Weight: (!) 171 kg (377 lb)   Height: 1.753 m (5' 9\")       Medications - No data to display         PROCEDURES:  None    FINAL

## 2025-03-30 LAB
BACTERIA UR CULT: ABNORMAL
ORGANISM: ABNORMAL

## 2025-03-31 ENCOUNTER — OFFICE VISIT (OUTPATIENT)
Dept: FAMILY MEDICINE CLINIC | Age: 50
End: 2025-03-31
Payer: COMMERCIAL

## 2025-03-31 VITALS
HEIGHT: 69 IN | DIASTOLIC BLOOD PRESSURE: 76 MMHG | RESPIRATION RATE: 16 BRPM | HEART RATE: 80 BPM | OXYGEN SATURATION: 98 % | BODY MASS INDEX: 43.4 KG/M2 | WEIGHT: 293 LBS | SYSTOLIC BLOOD PRESSURE: 118 MMHG

## 2025-03-31 DIAGNOSIS — R39.9 UTI SYMPTOMS: Primary | ICD-10-CM

## 2025-03-31 DIAGNOSIS — R10.32 BILATERAL LOWER ABDOMINAL DISCOMFORT: ICD-10-CM

## 2025-03-31 DIAGNOSIS — R10.31 BILATERAL LOWER ABDOMINAL DISCOMFORT: ICD-10-CM

## 2025-03-31 LAB
BILIRUBIN, POC: NORMAL
BLOOD URINE, POC: NORMAL
CLARITY, POC: NORMAL
COLOR, POC: YELLOW
GLUCOSE URINE, POC: NORMAL MG/DL
KETONES, POC: NORMAL MG/DL
LEUKOCYTE EST, POC: NORMAL
NITRITE, POC: NORMAL
PH, POC: 6.5
PROTEIN, POC: NORMAL MG/DL
SPECIFIC GRAVITY, POC: 1.01
UROBILINOGEN, POC: 0.2 MG/DL

## 2025-03-31 PROCEDURE — 81003 URINALYSIS AUTO W/O SCOPE: CPT | Performed by: STUDENT IN AN ORGANIZED HEALTH CARE EDUCATION/TRAINING PROGRAM

## 2025-03-31 PROCEDURE — 99214 OFFICE O/P EST MOD 30 MIN: CPT | Performed by: STUDENT IN AN ORGANIZED HEALTH CARE EDUCATION/TRAINING PROGRAM

## 2025-03-31 RX ORDER — CIPROFLOXACIN 500 MG/1
500 TABLET, FILM COATED ORAL 2 TIMES DAILY
Qty: 10 TABLET | Refills: 0 | Status: ON HOLD | OUTPATIENT
Start: 2025-03-31 | End: 2025-04-06 | Stop reason: HOSPADM

## 2025-03-31 NOTE — PROGRESS NOTES
[facility] PROFESSIONAL SERVS  [facility]  [address] [id number]  Dept: [phone]  Dept Fax: [phone]  Loc: [phone]    Poncho Jasso is a 49 y.o. female who presents today for her medical conditions/complaints as noted below.     Chief Complaint   Patient presents with    Urinary Tract Infection     Pt state she was seen at  on Friday and was given ATB- seen at ER yesterday continued ATB and pushed fluids but sx no better        HPI:     Patient presents to the office today for concerns of UTI.  Patient states that she developed a low-grade temp, darker urine, lower abdominal discomfort, and lower back pain last week.  Went to the urgent care last Friday (3 days ago) and was given Macrobid for suspected UTI.  States that symptoms did not improve and she also developed slight burning with urination, so she was seen at [facility] ED yesterday.  She was told that she likely does have a UTI and was told to continue her Macrobid as prescribed.  States that symptoms have persisted and not improved.  She also feels that she has had incomplete bladder emptying today.  Had a small amount of diarrhea yesterday, but none today and denies any associated vomiting.  Denies associated abnormal vaginal discharge or vaginal itching.    [facility] 3/16/2025.      Past Medical History:   Diagnosis Date    Asthma     seasonal    Blood circulation, collateral 1995    soccer injury    Ana Maria Fry infection     Gastro-oesophageal reflux disease with hiatal hernia     Headache     Irritable bowel syndrome     Pneumonia       Past Surgical History:   Procedure Laterality Date    BREAST SURGERY Left 2005    lupmectomy    CHOLECYSTECTOMY  1998    COLONOSCOPY  2012    CYST REMOVAL      ENDOSCOPY, COLON, DIAGNOSTIC  1998    hiatal hernia & abdominal spasms    SHOULDER ARTHROSCOPY Right 5/27/2021    RIGHT SHOULDER ARTHROSCOPY, DISTAL CALVICLE EXCISION, SUBACROMIAL DECOMPRESSION BICEP

## 2025-04-01 LAB
CANDIDA SPECIES, DNA PROBE: POSITIVE
GARDNERELLA VAGINALIS, DNA PROBE: POSITIVE
SOURCE: ABNORMAL
TRICHOMONAS VAGINALIS DNA: NEGATIVE

## 2025-04-01 ASSESSMENT — ENCOUNTER SYMPTOMS
CONSTIPATION: 0
BACK PAIN: 1
NAUSEA: 1
VOMITING: 0
ABDOMINAL PAIN: 1
DIARRHEA: 1

## 2025-04-02 ENCOUNTER — RESULTS FOLLOW-UP (OUTPATIENT)
Dept: FAMILY MEDICINE CLINIC | Age: 50
End: 2025-04-02

## 2025-04-02 ENCOUNTER — APPOINTMENT (OUTPATIENT)
Dept: ULTRASOUND IMAGING | Age: 50
DRG: 075 | End: 2025-04-02
Attending: STUDENT IN AN ORGANIZED HEALTH CARE EDUCATION/TRAINING PROGRAM
Payer: COMMERCIAL

## 2025-04-02 ENCOUNTER — HOSPITAL ENCOUNTER (INPATIENT)
Age: 50
LOS: 2 days | Discharge: HOME | DRG: 075 | End: 2025-04-06
Attending: STUDENT IN AN ORGANIZED HEALTH CARE EDUCATION/TRAINING PROGRAM | Admitting: STUDENT IN AN ORGANIZED HEALTH CARE EDUCATION/TRAINING PROGRAM
Payer: COMMERCIAL

## 2025-04-02 DIAGNOSIS — N76.0 BACTERIAL VAGINOSIS: Primary | ICD-10-CM

## 2025-04-02 DIAGNOSIS — B96.89 BACTERIAL VAGINOSIS: Primary | ICD-10-CM

## 2025-04-02 DIAGNOSIS — B37.31 VAGINAL YEAST INFECTION: ICD-10-CM

## 2025-04-02 DIAGNOSIS — B00.3 MENINGITIS DUE TO HERPES SIMPLEX VIRUS TYPE 2 (HSV-2): Primary | ICD-10-CM

## 2025-04-02 PROBLEM — E83.51 HYPOCALCEMIA: Status: ACTIVE | Noted: 2025-04-02

## 2025-04-02 PROBLEM — M19.011 ARTHRITIS OF RIGHT SHOULDER REGION: Status: RESOLVED | Noted: 2025-04-02 | Resolved: 2025-04-02

## 2025-04-02 PROBLEM — F41.9 ANXIETY: Status: ACTIVE | Noted: 2025-04-02

## 2025-04-02 PROBLEM — S72.90XA FRACTURE OF FEMUR: Status: RESOLVED | Noted: 2025-04-02 | Resolved: 2025-04-02

## 2025-04-02 PROBLEM — U07.1 COVID-19: Status: RESOLVED | Noted: 2025-04-02 | Resolved: 2025-04-02

## 2025-04-02 PROBLEM — N80.9 ENDOMETRIOSIS: Status: RESOLVED | Noted: 2025-04-02 | Resolved: 2025-04-02

## 2025-04-02 PROBLEM — N30.00 ACUTE CYSTITIS WITHOUT HEMATURIA: Status: ACTIVE | Noted: 2025-04-02

## 2025-04-02 PROBLEM — G43.909 MIGRAINES: Status: ACTIVE | Noted: 2025-04-02

## 2025-04-02 PROBLEM — M67.813 BICEPS TENDONOSIS OF RIGHT SHOULDER: Status: RESOLVED | Noted: 2021-05-27 | Resolved: 2025-04-02

## 2025-04-02 PROBLEM — E83.42 HYPOMAGNESEMIA: Status: ACTIVE | Noted: 2025-04-02

## 2025-04-02 PROBLEM — E27.9: Status: ACTIVE | Noted: 2025-04-02

## 2025-04-02 PROBLEM — R51.9 HEADACHE: Status: ACTIVE | Noted: 2025-04-02

## 2025-04-02 PROBLEM — M54.50 ACUTE BILATERAL LOW BACK PAIN WITHOUT SCIATICA: Status: ACTIVE | Noted: 2025-04-02

## 2025-04-02 PROBLEM — M79.10 MYALGIA: Status: RESOLVED | Noted: 2025-04-02 | Resolved: 2025-04-02

## 2025-04-02 PROBLEM — S46.911A STRAIN OF RIGHT SHOULDER: Status: RESOLVED | Noted: 2025-04-02 | Resolved: 2025-04-02

## 2025-04-02 PROBLEM — M75.100 NONTRAUMATIC ROTATOR CUFF TEAR: Status: RESOLVED | Noted: 2025-04-02 | Resolved: 2025-04-02

## 2025-04-02 PROBLEM — K80.20 CALCULUS OF GALLBLADDER: Status: RESOLVED | Noted: 2025-04-02 | Resolved: 2025-04-02

## 2025-04-02 LAB
ALBUMIN SERPL BCG-MCNC: 3.4 G/DL (ref 3.4–4.9)
ALP SERPL-CCNC: 90 U/L (ref 35–104)
ALT SERPL W/O P-5'-P-CCNC: 13 U/L (ref 10–35)
ANION GAP SERPL CALC-SCNC: 11 MEQ/L (ref 8–16)
AST SERPL-CCNC: 14 U/L (ref 10–35)
BILIRUB CONJ SERPL-MCNC: 0.2 MG/DL (ref 0–0.2)
BILIRUB SERPL-MCNC: 0.3 MG/DL (ref 0.3–1.2)
BUN SERPL-MCNC: 11 MG/DL (ref 8–23)
CA-I BLD ISE-SCNC: 1.05 MMOL/L (ref 1.12–1.32)
CALCIUM SERPL-MCNC: 7.8 MG/DL (ref 8.6–10)
CHLORIDE SERPL-SCNC: 101 MEQ/L (ref 98–111)
CO2 SERPL-SCNC: 26 MEQ/L (ref 22–29)
CORTIS SERPL-MCNC: 8.2 UG/DL
CORTISOL COLLECTION INFO: NORMAL
CREAT SERPL-MCNC: 0.7 MG/DL (ref 0.5–0.9)
EKG ATRIAL RATE: 76 BPM
EKG P AXIS: 36 DEGREES
EKG P-R INTERVAL: 144 MS
EKG Q-T INTERVAL: 408 MS
EKG QRS DURATION: 82 MS
EKG QTC CALCULATION (BAZETT): 459 MS
EKG R AXIS: 21 DEGREES
EKG T AXIS: 35 DEGREES
EKG VENTRICULAR RATE: 76 BPM
GFR SERPL CREATININE-BSD FRML MDRD: > 90 ML/MIN/1.73M2
GLUCOSE SERPL-MCNC: 97 MG/DL (ref 74–109)
MAGNESIUM SERPL-MCNC: 1.6 MG/DL (ref 1.6–2.6)
POTASSIUM SERPL-SCNC: 3.9 MEQ/L (ref 3.5–5.2)
PROT SERPL-MCNC: 6.6 G/DL (ref 6.4–8.3)
PTH-INTACT SERPL-MCNC: 56 PG/ML (ref 15–65)
SODIUM SERPL-SCNC: 138 MEQ/L (ref 135–145)

## 2025-04-02 PROCEDURE — 99223 1ST HOSP IP/OBS HIGH 75: CPT

## 2025-04-02 PROCEDURE — 82248 BILIRUBIN DIRECT: CPT

## 2025-04-02 PROCEDURE — G0378 HOSPITAL OBSERVATION PER HR: HCPCS

## 2025-04-02 PROCEDURE — 96372 THER/PROPH/DIAG INJ SC/IM: CPT

## 2025-04-02 PROCEDURE — 6360000002 HC RX W HCPCS

## 2025-04-02 PROCEDURE — 96366 THER/PROPH/DIAG IV INF ADDON: CPT

## 2025-04-02 PROCEDURE — 93010 ELECTROCARDIOGRAM REPORT: CPT | Performed by: INTERNAL MEDICINE

## 2025-04-02 PROCEDURE — 83735 ASSAY OF MAGNESIUM: CPT

## 2025-04-02 PROCEDURE — 83970 ASSAY OF PARATHORMONE: CPT

## 2025-04-02 PROCEDURE — 36415 COLL VENOUS BLD VENIPUNCTURE: CPT

## 2025-04-02 PROCEDURE — 2500000003 HC RX 250 WO HCPCS: Performed by: STUDENT IN AN ORGANIZED HEALTH CARE EDUCATION/TRAINING PROGRAM

## 2025-04-02 PROCEDURE — 82533 TOTAL CORTISOL: CPT

## 2025-04-02 PROCEDURE — G0379 DIRECT REFER HOSPITAL OBSERV: HCPCS

## 2025-04-02 PROCEDURE — 87086 URINE CULTURE/COLONY COUNT: CPT

## 2025-04-02 PROCEDURE — 96365 THER/PROPH/DIAG IV INF INIT: CPT

## 2025-04-02 PROCEDURE — 6370000000 HC RX 637 (ALT 250 FOR IP)

## 2025-04-02 PROCEDURE — 6360000002 HC RX W HCPCS: Performed by: STUDENT IN AN ORGANIZED HEALTH CARE EDUCATION/TRAINING PROGRAM

## 2025-04-02 PROCEDURE — 80053 COMPREHEN METABOLIC PANEL: CPT

## 2025-04-02 PROCEDURE — 93005 ELECTROCARDIOGRAM TRACING: CPT

## 2025-04-02 PROCEDURE — 76770 US EXAM ABDO BACK WALL COMP: CPT

## 2025-04-02 PROCEDURE — 82330 ASSAY OF CALCIUM: CPT

## 2025-04-02 RX ORDER — FLUCONAZOLE 150 MG/1
TABLET ORAL
Qty: 2 TABLET | Refills: 0 | Status: ON HOLD | OUTPATIENT
Start: 2025-04-02 | End: 2025-04-06 | Stop reason: HOSPADM

## 2025-04-02 RX ORDER — ONDANSETRON 4 MG/1
4 TABLET, ORALLY DISINTEGRATING ORAL EVERY 8 HOURS PRN
Status: DISCONTINUED | OUTPATIENT
Start: 2025-04-02 | End: 2025-04-06 | Stop reason: HOSPADM

## 2025-04-02 RX ORDER — SODIUM CHLORIDE 0.9 % (FLUSH) 0.9 %
5-40 SYRINGE (ML) INJECTION PRN
Status: DISCONTINUED | OUTPATIENT
Start: 2025-04-02 | End: 2025-04-06 | Stop reason: HOSPADM

## 2025-04-02 RX ORDER — HYDROCODONE BITARTRATE AND ACETAMINOPHEN 5; 325 MG/1; MG/1
2 TABLET ORAL EVERY 4 HOURS PRN
Status: DISCONTINUED | OUTPATIENT
Start: 2025-04-02 | End: 2025-04-04

## 2025-04-02 RX ORDER — TOPIRAMATE 100 MG/1
200 TABLET, FILM COATED ORAL DAILY
Status: DISCONTINUED | OUTPATIENT
Start: 2025-04-02 | End: 2025-04-06 | Stop reason: HOSPADM

## 2025-04-02 RX ORDER — ACETAMINOPHEN 325 MG/1
650 TABLET ORAL EVERY 6 HOURS PRN
Status: DISCONTINUED | OUTPATIENT
Start: 2025-04-02 | End: 2025-04-02

## 2025-04-02 RX ORDER — ONDANSETRON 2 MG/ML
4 INJECTION INTRAMUSCULAR; INTRAVENOUS EVERY 6 HOURS PRN
Status: DISCONTINUED | OUTPATIENT
Start: 2025-04-02 | End: 2025-04-06 | Stop reason: HOSPADM

## 2025-04-02 RX ORDER — LIDOCAINE 4 G/G
1 PATCH TOPICAL DAILY
Status: DISCONTINUED | OUTPATIENT
Start: 2025-04-02 | End: 2025-04-06 | Stop reason: HOSPADM

## 2025-04-02 RX ORDER — CITALOPRAM HYDROBROMIDE 20 MG/1
20 TABLET ORAL DAILY
Status: DISCONTINUED | OUTPATIENT
Start: 2025-04-02 | End: 2025-04-06 | Stop reason: HOSPADM

## 2025-04-02 RX ORDER — SODIUM CHLORIDE 9 MG/ML
INJECTION, SOLUTION INTRAVENOUS PRN
Status: DISCONTINUED | OUTPATIENT
Start: 2025-04-02 | End: 2025-04-06 | Stop reason: HOSPADM

## 2025-04-02 RX ORDER — ACETAMINOPHEN 650 MG/1
650 SUPPOSITORY RECTAL EVERY 6 HOURS PRN
Status: DISCONTINUED | OUTPATIENT
Start: 2025-04-02 | End: 2025-04-02

## 2025-04-02 RX ORDER — POLYETHYLENE GLYCOL 3350 17 G/17G
17 POWDER, FOR SOLUTION ORAL DAILY PRN
Status: DISCONTINUED | OUTPATIENT
Start: 2025-04-02 | End: 2025-04-05

## 2025-04-02 RX ORDER — SODIUM CHLORIDE 0.9 % (FLUSH) 0.9 %
5-40 SYRINGE (ML) INJECTION EVERY 12 HOURS SCHEDULED
Status: DISCONTINUED | OUTPATIENT
Start: 2025-04-02 | End: 2025-04-06 | Stop reason: HOSPADM

## 2025-04-02 RX ORDER — POTASSIUM CHLORIDE 7.45 MG/ML
10 INJECTION INTRAVENOUS PRN
Status: DISCONTINUED | OUTPATIENT
Start: 2025-04-02 | End: 2025-04-06 | Stop reason: HOSPADM

## 2025-04-02 RX ORDER — PANTOPRAZOLE SODIUM 40 MG/1
40 TABLET, DELAYED RELEASE ORAL
Status: DISCONTINUED | OUTPATIENT
Start: 2025-04-03 | End: 2025-04-06 | Stop reason: HOSPADM

## 2025-04-02 RX ORDER — MAGNESIUM SULFATE IN WATER 40 MG/ML
2000 INJECTION, SOLUTION INTRAVENOUS PRN
Status: DISCONTINUED | OUTPATIENT
Start: 2025-04-02 | End: 2025-04-06 | Stop reason: HOSPADM

## 2025-04-02 RX ORDER — FLUCONAZOLE 100 MG/1
150 TABLET ORAL ONCE
Status: COMPLETED | OUTPATIENT
Start: 2025-04-02 | End: 2025-04-02

## 2025-04-02 RX ORDER — CALCIUM GLUCONATE 20 MG/ML
2000 INJECTION, SOLUTION INTRAVENOUS ONCE
Status: COMPLETED | OUTPATIENT
Start: 2025-04-02 | End: 2025-04-02

## 2025-04-02 RX ORDER — POTASSIUM CHLORIDE 1500 MG/1
40 TABLET, EXTENDED RELEASE ORAL PRN
Status: DISCONTINUED | OUTPATIENT
Start: 2025-04-02 | End: 2025-04-06 | Stop reason: HOSPADM

## 2025-04-02 RX ORDER — CIPROFLOXACIN 500 MG/1
500 TABLET, FILM COATED ORAL 2 TIMES DAILY
Status: DISCONTINUED | OUTPATIENT
Start: 2025-04-02 | End: 2025-04-06 | Stop reason: HOSPADM

## 2025-04-02 RX ORDER — ENOXAPARIN SODIUM 100 MG/ML
40 INJECTION SUBCUTANEOUS 2 TIMES DAILY
Status: DISCONTINUED | OUTPATIENT
Start: 2025-04-02 | End: 2025-04-06 | Stop reason: HOSPADM

## 2025-04-02 RX ORDER — HYDROCODONE BITARTRATE AND ACETAMINOPHEN 5; 325 MG/1; MG/1
1 TABLET ORAL EVERY 4 HOURS PRN
Status: DISCONTINUED | OUTPATIENT
Start: 2025-04-02 | End: 2025-04-04

## 2025-04-02 RX ORDER — METRONIDAZOLE 500 MG/1
500 TABLET ORAL 2 TIMES DAILY
Qty: 14 TABLET | Refills: 0 | Status: SHIPPED | OUTPATIENT
Start: 2025-04-02 | End: 2025-04-09

## 2025-04-02 RX ADMIN — HYDROCODONE BITARTRATE AND ACETAMINOPHEN 2 TABLET: 5; 325 TABLET ORAL at 18:33

## 2025-04-02 RX ADMIN — CIPROFLOXACIN HYDROCHLORIDE 500 MG: 500 TABLET, FILM COATED ORAL at 20:52

## 2025-04-02 RX ADMIN — TOPIRAMATE 200 MG: 100 TABLET, FILM COATED ORAL at 20:52

## 2025-04-02 RX ADMIN — CITALOPRAM HYDROBROMIDE 20 MG: 20 TABLET ORAL at 20:52

## 2025-04-02 RX ADMIN — FLUCONAZOLE 150 MG: 100 TABLET ORAL at 20:52

## 2025-04-02 RX ADMIN — SODIUM CHLORIDE, PRESERVATIVE FREE 10 ML: 5 INJECTION INTRAVENOUS at 20:55

## 2025-04-02 RX ADMIN — CALCIUM GLUCONATE 2000 MG: 20 INJECTION, SOLUTION INTRAVENOUS at 20:51

## 2025-04-02 RX ADMIN — HYDROCODONE BITARTRATE AND ACETAMINOPHEN 2 TABLET: 5; 325 TABLET ORAL at 23:39

## 2025-04-02 RX ADMIN — ENOXAPARIN SODIUM 40 MG: 100 INJECTION SUBCUTANEOUS at 20:53

## 2025-04-02 ASSESSMENT — PAIN DESCRIPTION - LOCATION
LOCATION: BACK;HIP
LOCATION: BACK;HIP
LOCATION: BACK;HEAD;ABDOMEN

## 2025-04-02 ASSESSMENT — PAIN SCALES - GENERAL
PAINLEVEL_OUTOF10: 10
PAINLEVEL_OUTOF10: 9
PAINLEVEL_OUTOF10: 7
PAINLEVEL_OUTOF10: 4
PAINLEVEL_OUTOF10: 10

## 2025-04-02 ASSESSMENT — PAIN DESCRIPTION - DESCRIPTORS
DESCRIPTORS: ACHING;DISCOMFORT
DESCRIPTORS: ACHING

## 2025-04-02 NOTE — PLAN OF CARE
Problem: Discharge Planning  Goal: Discharge to home or other facility with appropriate resources  Outcome: Progressing  Flowsheets (Taken 4/2/2025 1531)  Discharge to home or other facility with appropriate resources: Identify barriers to discharge with patient and caregiver     Problem: Safety - Adult  Goal: Free from fall injury  Outcome: Progressing     Problem: Pain  Goal: Verbalizes/displays adequate comfort level or baseline comfort level  Outcome: Progressing

## 2025-04-02 NOTE — PROCEDURES
PROCEDURE NOTE  Date: 4/2/2025   Name: Poncho Jasso  YOB: 1975    Procedures  12 lead EKG completed. Results handed to [place] SORIANO.

## 2025-04-03 LAB
ANION GAP SERPL CALC-SCNC: 10 MEQ/L (ref 8–16)
BASOPHILS ABSOLUTE: 0 THOU/MM3 (ref 0–0.1)
BASOPHILS NFR BLD AUTO: 0.3 %
BUN SERPL-MCNC: 9 MG/DL (ref 8–23)
CA-I BLD ISE-SCNC: 1.16 MMOL/L (ref 1.12–1.32)
CALCIUM SERPL-MCNC: 8.3 MG/DL (ref 8.6–10)
CHLORIDE SERPL-SCNC: 102 MEQ/L (ref 98–111)
CO2 SERPL-SCNC: 25 MEQ/L (ref 22–29)
CREAT SERPL-MCNC: 0.7 MG/DL (ref 0.5–0.9)
CRP SERPL-MCNC: 2.9 MG/DL (ref 0–0.5)
DEPRECATED RDW RBC AUTO: 45.7 FL (ref 35–45)
EOSINOPHIL NFR BLD AUTO: 4.8 %
EOSINOPHILS ABSOLUTE: 0.5 THOU/MM3 (ref 0–0.4)
ERYTHROCYTE [DISTWIDTH] IN BLOOD BY AUTOMATED COUNT: 13.3 % (ref 11.5–14.5)
ERYTHROCYTE [SEDIMENTATION RATE] IN BLOOD BY WESTERGREN METHOD: 54 MM/HR (ref 0–20)
GFR SERPL CREATININE-BSD FRML MDRD: > 90 ML/MIN/1.73M2
GLUCOSE SERPL-MCNC: 125 MG/DL (ref 74–109)
HCT VFR BLD AUTO: 38.5 % (ref 37–47)
HGB BLD-MCNC: 12.4 GM/DL (ref 12–16)
IMM GRANULOCYTES # BLD AUTO: 0.03 THOU/MM3 (ref 0–0.07)
IMM GRANULOCYTES NFR BLD AUTO: 0.3 %
LYMPHOCYTES ABSOLUTE: 3.2 THOU/MM3 (ref 1–4.8)
LYMPHOCYTES NFR BLD AUTO: 33.2 %
MAGNESIUM SERPL-MCNC: 1.8 MG/DL (ref 1.6–2.6)
MCH RBC QN AUTO: 30 PG (ref 26–33)
MCHC RBC AUTO-ENTMCNC: 32.2 GM/DL (ref 32.2–35.5)
MCV RBC AUTO: 93.2 FL (ref 81–99)
MONOCYTES ABSOLUTE: 0.7 THOU/MM3 (ref 0.4–1.3)
MONOCYTES NFR BLD AUTO: 7 %
NEUTROPHILS ABSOLUTE: 5.2 THOU/MM3 (ref 1.8–7.7)
NEUTROPHILS NFR BLD AUTO: 54.4 %
NRBC BLD AUTO-RTO: 0 /100 WBC
PLATELET # BLD AUTO: 361 THOU/MM3 (ref 130–400)
PMV BLD AUTO: 9.4 FL (ref 9.4–12.4)
POTASSIUM SERPL-SCNC: 4.1 MEQ/L (ref 3.5–5.2)
RBC # BLD AUTO: 4.13 MILL/MM3 (ref 4.2–5.4)
SODIUM SERPL-SCNC: 137 MEQ/L (ref 135–145)
WBC # BLD AUTO: 9.6 THOU/MM3 (ref 4.8–10.8)

## 2025-04-03 PROCEDURE — 2500000003 HC RX 250 WO HCPCS: Performed by: STUDENT IN AN ORGANIZED HEALTH CARE EDUCATION/TRAINING PROGRAM

## 2025-04-03 PROCEDURE — 80048 BASIC METABOLIC PNL TOTAL CA: CPT

## 2025-04-03 PROCEDURE — 96366 THER/PROPH/DIAG IV INF ADDON: CPT

## 2025-04-03 PROCEDURE — 82330 ASSAY OF CALCIUM: CPT

## 2025-04-03 PROCEDURE — 6360000002 HC RX W HCPCS: Performed by: STUDENT IN AN ORGANIZED HEALTH CARE EDUCATION/TRAINING PROGRAM

## 2025-04-03 PROCEDURE — 6370000000 HC RX 637 (ALT 250 FOR IP)

## 2025-04-03 PROCEDURE — 6370000000 HC RX 637 (ALT 250 FOR IP): Performed by: STUDENT IN AN ORGANIZED HEALTH CARE EDUCATION/TRAINING PROGRAM

## 2025-04-03 PROCEDURE — 96367 TX/PROPH/DG ADDL SEQ IV INF: CPT

## 2025-04-03 PROCEDURE — 36415 COLL VENOUS BLD VENIPUNCTURE: CPT

## 2025-04-03 PROCEDURE — 83735 ASSAY OF MAGNESIUM: CPT

## 2025-04-03 PROCEDURE — 96375 TX/PRO/DX INJ NEW DRUG ADDON: CPT

## 2025-04-03 PROCEDURE — 6360000002 HC RX W HCPCS

## 2025-04-03 PROCEDURE — 99232 SBSQ HOSP IP/OBS MODERATE 35: CPT

## 2025-04-03 PROCEDURE — 85025 COMPLETE CBC W/AUTO DIFF WBC: CPT

## 2025-04-03 PROCEDURE — 96372 THER/PROPH/DIAG INJ SC/IM: CPT

## 2025-04-03 PROCEDURE — 85651 RBC SED RATE NONAUTOMATED: CPT

## 2025-04-03 PROCEDURE — 87040 BLOOD CULTURE FOR BACTERIA: CPT

## 2025-04-03 PROCEDURE — 86140 C-REACTIVE PROTEIN: CPT

## 2025-04-03 PROCEDURE — G0378 HOSPITAL OBSERVATION PER HR: HCPCS

## 2025-04-03 RX ORDER — KETOROLAC TROMETHAMINE 30 MG/ML
15 INJECTION, SOLUTION INTRAMUSCULAR; INTRAVENOUS EVERY 6 HOURS PRN
Status: DISPENSED | OUTPATIENT
Start: 2025-04-03 | End: 2025-04-03

## 2025-04-03 RX ORDER — BUTALBITAL, ACETAMINOPHEN AND CAFFEINE 50; 325; 40 MG/1; MG/1; MG/1
1 TABLET ORAL ONCE
Status: COMPLETED | OUTPATIENT
Start: 2025-04-03 | End: 2025-04-03

## 2025-04-03 RX ORDER — MAGNESIUM SULFATE IN WATER 40 MG/ML
2000 INJECTION, SOLUTION INTRAVENOUS ONCE
Status: COMPLETED | OUTPATIENT
Start: 2025-04-03 | End: 2025-04-03

## 2025-04-03 RX ADMIN — SODIUM CHLORIDE, PRESERVATIVE FREE 10 ML: 5 INJECTION INTRAVENOUS at 10:09

## 2025-04-03 RX ADMIN — SODIUM CHLORIDE, PRESERVATIVE FREE 10 ML: 5 INJECTION INTRAVENOUS at 21:39

## 2025-04-03 RX ADMIN — HYDROCODONE BITARTRATE AND ACETAMINOPHEN 2 TABLET: 5; 325 TABLET ORAL at 05:03

## 2025-04-03 RX ADMIN — KETOROLAC TROMETHAMINE 15 MG: 30 INJECTION, SOLUTION INTRAMUSCULAR at 11:46

## 2025-04-03 RX ADMIN — MAGNESIUM SULFATE HEPTAHYDRATE 2000 MG: 40 INJECTION, SOLUTION INTRAVENOUS at 11:50

## 2025-04-03 RX ADMIN — ENOXAPARIN SODIUM 40 MG: 100 INJECTION SUBCUTANEOUS at 21:30

## 2025-04-03 RX ADMIN — POLYETHYLENE GLYCOL 3350 17 G: 17 POWDER, FOR SOLUTION ORAL at 06:25

## 2025-04-03 RX ADMIN — HYDROCODONE BITARTRATE AND ACETAMINOPHEN 2 TABLET: 5; 325 TABLET ORAL at 16:47

## 2025-04-03 RX ADMIN — BUTALBITAL, ACETAMINOPHEN AND CAFFEINE 1 TABLET: 325; 50; 40 TABLET ORAL at 11:46

## 2025-04-03 RX ADMIN — HYDROCODONE BITARTRATE AND ACETAMINOPHEN 1 TABLET: 5; 325 TABLET ORAL at 10:14

## 2025-04-03 RX ADMIN — HYDROCODONE BITARTRATE AND ACETAMINOPHEN 2 TABLET: 5; 325 TABLET ORAL at 21:32

## 2025-04-03 RX ADMIN — TOPIRAMATE 200 MG: 100 TABLET, FILM COATED ORAL at 10:09

## 2025-04-03 RX ADMIN — CITALOPRAM HYDROBROMIDE 20 MG: 20 TABLET ORAL at 10:09

## 2025-04-03 RX ADMIN — CIPROFLOXACIN HYDROCHLORIDE 500 MG: 500 TABLET, FILM COATED ORAL at 10:09

## 2025-04-03 RX ADMIN — ENOXAPARIN SODIUM 40 MG: 100 INJECTION SUBCUTANEOUS at 10:08

## 2025-04-03 RX ADMIN — CIPROFLOXACIN HYDROCHLORIDE 500 MG: 500 TABLET, FILM COATED ORAL at 21:50

## 2025-04-03 RX ADMIN — PANTOPRAZOLE SODIUM 40 MG: 40 TABLET, DELAYED RELEASE ORAL at 05:04

## 2025-04-03 ASSESSMENT — PAIN SCALES - GENERAL
PAINLEVEL_OUTOF10: 10
PAINLEVEL_OUTOF10: 8
PAINLEVEL_OUTOF10: 4
PAINLEVEL_OUTOF10: 5
PAINLEVEL_OUTOF10: 7
PAINLEVEL_OUTOF10: 1
PAINLEVEL_OUTOF10: 4
PAINLEVEL_OUTOF10: 0

## 2025-04-03 ASSESSMENT — PAIN DESCRIPTION - DESCRIPTORS
DESCRIPTORS: ACHING
DESCRIPTORS: ACHING

## 2025-04-03 ASSESSMENT — PAIN DESCRIPTION - ORIENTATION
ORIENTATION: LOWER
ORIENTATION: LEFT;RIGHT

## 2025-04-03 ASSESSMENT — PAIN DESCRIPTION - LOCATION
LOCATION: HEAD
LOCATION: HIP
LOCATION: HIP;HEAD
LOCATION: BACK

## 2025-04-03 NOTE — CARE COORDINATION
04/03/25 0914   Service Assessment   Patient Orientation Alert and Oriented;Person;Place;Situation;Self   Cognition Alert   History Provided By Patient;Medical Record   Primary Caregiver Self   Accompanied By/Relationship Unaccompanied   Support Systems Family Members   Patient's Healthcare Decision Maker is: Patient Declined (Legal Next of Kin Remains as Decision Maker)   PCP Verified by CM Yes   Last Visit to PCP Within last 3 months   Prior Functional Level Independent in ADLs/IADLs;Bathing;Dressing;Toileting;Cooking;Housework;Shopping;Mobility;Feeding   Current Functional Level Independent in ADLs/IADLs;Bathing;Feeding;Toileting;Dressing;Cooking;Housework;Shopping;Mobility   Can patient return to prior living arrangement Yes   Ability to make needs known: Good   Family able to assist with home care needs: Yes   Would you like for me to discuss the discharge plan with any other family members/significant others, and if so, who? No   Financial Resources Other (Comment)  ([employer])   Community Resources None   CM/SW Referral Other (see comment)  (N/A)   Social/Functional History   Active  Yes   Occupation Full time employment   Discharge Planning   Type of Residence House   Living Arrangements Alone   Current Services Prior To Admission None   Potential Assistance Needed N/A   DME Ordered? No   Potential Assistance Purchasing Medications No   Type of Home Care Services None   Patient expects to be discharged to: House   One/Two Story Residence One story   History of falls? 0   Services At/After Discharge   Transition of Care Consult (CM Consult) Discharge Planning   Services At/After Discharge None   Confirm Follow Up Transport Family   Condition of Participation: Discharge Planning   The Plan for Transition of Care is related to the following treatment goals: feel better, UTI to respond to medications.   Freedom of Choice list was provided with basic dialogue that supports the patient's individualized plan

## 2025-04-03 NOTE — PLAN OF CARE
Problem: Discharge Planning  Goal: Discharge to home or other facility with appropriate resources  4/3/2025 0224 by Chapincito Ruggiero RN  Outcome: Progressing  4/2/2025 1632 by [place] Fernando RN  Outcome: Progressing  Flowsheets (Taken 4/2/2025 1531)  Discharge to home or other facility with appropriate resources: Identify barriers to discharge with patient and caregiver     Problem: Safety - Adult  Goal: Free from fall injury  4/3/2025 0224 by Chapincito Ruggiero RN  Outcome: Progressing  4/2/2025 1632 by Florida Fernando, RN  Outcome: Progressing     Problem: Pain  Goal: Verbalizes/displays adequate comfort level or baseline comfort level  4/3/2025 0224 by Chapincito Ruggiero RN  Outcome: Progressing  4/2/2025 1632 by [place] Fernando, RN  Outcome: Progressing

## 2025-04-03 NOTE — PLAN OF CARE
Problem: Discharge Planning  Goal: Discharge to home or other facility with appropriate resources  4/3/2025 1113 by Jaclyn Wills RN  Outcome: Progressing     Problem: Safety - Adult  Goal: Free from fall injury  4/3/2025 1113 by Jaclyn Wills RN  Outcome: Progressing     Problem: Pain  Goal: Verbalizes/displays adequate comfort level or baseline comfort level  4/3/2025 1113 by Jaclyn Wills RN  Outcome: Progressing  Flowsheets (Taken 4/3/2025 1113)  Verbalizes/displays adequate comfort level or baseline comfort level:   Encourage patient to monitor pain and request assistance   Administer analgesics based on type and severity of pain and evaluate response   Assess pain using appropriate pain scale   Implement non-pharmacological measures as appropriate and evaluate response

## 2025-04-04 ENCOUNTER — APPOINTMENT (OUTPATIENT)
Dept: GENERAL RADIOLOGY | Age: 50
DRG: 075 | End: 2025-04-04
Attending: STUDENT IN AN ORGANIZED HEALTH CARE EDUCATION/TRAINING PROGRAM
Payer: COMMERCIAL

## 2025-04-04 ENCOUNTER — APPOINTMENT (OUTPATIENT)
Dept: INTERVENTIONAL RADIOLOGY/VASCULAR | Age: 50
DRG: 075 | End: 2025-04-04
Payer: COMMERCIAL

## 2025-04-04 PROBLEM — B00.3 MENINGITIS DUE TO HERPES SIMPLEX VIRUS TYPE 2 (HSV-2): Status: ACTIVE | Noted: 2025-04-04

## 2025-04-04 LAB
ANION GAP SERPL CALC-SCNC: 13 MEQ/L (ref 8–16)
BACTERIA UR CULT: ABNORMAL
BUN SERPL-MCNC: 9 MG/DL (ref 8–23)
CALCIUM SERPL-MCNC: 9 MG/DL (ref 8.6–10)
CHARACTER, CSF: CLEAR
CHLORIDE SERPL-SCNC: 101 MEQ/L (ref 98–111)
CMV DNA CSF QL NAA+PROBE: NOT DETECTED
CO2 SERPL-SCNC: 20 MEQ/L (ref 22–29)
COLOR CSF: COLORLESS
CREAT SERPL-MCNC: 0.7 MG/DL (ref 0.5–0.9)
CRYPTOC AG CSF QL: NOT DETECTED
E COLI K1 AG CSF QL: NOT DETECTED
EV RNA CSF QL NAA+PROBE: NOT DETECTED
GFR SERPL CREATININE-BSD FRML MDRD: > 90 ML/MIN/1.73M2
GLUCOSE CSF-MCNC: 53 MG/DL (ref 40–80)
GLUCOSE SERPL-MCNC: 95 MG/DL (ref 74–109)
GP B STREP DNA SPEC QL NAA+PROBE: NOT DETECTED
HAEM INFLU DNA SPEC QL NAA+PROBE: NOT DETECTED
HHV6 DNA CSF QL NAA+PROBE: NOT DETECTED
HSV1 DNA CSF QL NAA+PROBE: NOT DETECTED
HSV2 DNA CSF QL NAA+PROBE: DETECTED
L MONOCYTOG DNA SPEC QL NAA+PROBE: NOT DETECTED
LYMPHOCYTES NFR CSF MANUAL: 93 % (ref 0–90)
MAGNESIUM SERPL-MCNC: 2.2 MG/DL (ref 1.6–2.6)
MONOS+MACROS NFR CSF MANUAL: 6 % (ref 0–45)
N MEN DNA SPEC QL NAA+PROBE: NOT DETECTED
NEUTS SEG NFR CSF MANUAL: 1 % (ref 0–6)
NUC CELL # FLD AUTO: 920 /CUMM (ref 0–5)
ORGANISM: ABNORMAL
PARECHOVIRUS A RNA SPEC QL NAA+PROBE: NOT DETECTED
PATHOLOGIST REVIEW: ABNORMAL
POTASSIUM SERPL-SCNC: 4.2 MEQ/L (ref 3.5–5.2)
PROT CSF-MCNC: 96 MG/DL (ref 15–45)
RBC # CSF MANUAL: 115 /CUMM
S PNEUM DNA SPEC QL NAA+PROBE: NOT DETECTED
SODIUM SERPL-SCNC: 134 MEQ/L (ref 135–145)
SPECIMEN SOURCE: NORMAL
TUBE VOLUME RECEIVED CSF: 2 ML
VZV DNA CSF QL NAA+PROBE: NOT DETECTED

## 2025-04-04 PROCEDURE — 80048 BASIC METABOLIC PNL TOTAL CA: CPT

## 2025-04-04 PROCEDURE — 82945 GLUCOSE OTHER FLUID: CPT

## 2025-04-04 PROCEDURE — 96372 THER/PROPH/DIAG INJ SC/IM: CPT

## 2025-04-04 PROCEDURE — 6360000002 HC RX W HCPCS: Performed by: STUDENT IN AN ORGANIZED HEALTH CARE EDUCATION/TRAINING PROGRAM

## 2025-04-04 PROCEDURE — 88112 CYTOPATH CELL ENHANCE TECH: CPT

## 2025-04-04 PROCEDURE — 6370000000 HC RX 637 (ALT 250 FOR IP)

## 2025-04-04 PROCEDURE — 73523 X-RAY EXAM HIPS BI 5/> VIEWS: CPT

## 2025-04-04 PROCEDURE — 84157 ASSAY OF PROTEIN OTHER: CPT

## 2025-04-04 PROCEDURE — 87075 CULTR BACTERIA EXCEPT BLOOD: CPT

## 2025-04-04 PROCEDURE — 96375 TX/PRO/DX INJ NEW DRUG ADDON: CPT

## 2025-04-04 PROCEDURE — 009U3ZX DRAINAGE OF SPINAL CANAL, PERCUTANEOUS APPROACH, DIAGNOSTIC: ICD-10-PCS | Performed by: RADIOLOGY

## 2025-04-04 PROCEDURE — 1200000000 HC SEMI PRIVATE

## 2025-04-04 PROCEDURE — 87798 DETECT AGENT NOS DNA AMP: CPT

## 2025-04-04 PROCEDURE — 2580000003 HC RX 258

## 2025-04-04 PROCEDURE — 83735 ASSAY OF MAGNESIUM: CPT

## 2025-04-04 PROCEDURE — 2709999900 IR LUMBAR PUNCTURE FOR DIAGNOSIS

## 2025-04-04 PROCEDURE — 62328 DX LMBR SPI PNXR W/FLUOR/CT: CPT

## 2025-04-04 PROCEDURE — 6370000000 HC RX 637 (ALT 250 FOR IP): Performed by: STUDENT IN AN ORGANIZED HEALTH CARE EDUCATION/TRAINING PROGRAM

## 2025-04-04 PROCEDURE — 6360000002 HC RX W HCPCS: Performed by: RADIOLOGY

## 2025-04-04 PROCEDURE — 6360000002 HC RX W HCPCS

## 2025-04-04 PROCEDURE — 86618 LYME DISEASE ANTIBODY: CPT

## 2025-04-04 PROCEDURE — 86788 WEST NILE VIRUS AB IGM: CPT

## 2025-04-04 PROCEDURE — 99233 SBSQ HOSP IP/OBS HIGH 50: CPT

## 2025-04-04 PROCEDURE — 86592 SYPHILIS TEST NON-TREP QUAL: CPT

## 2025-04-04 PROCEDURE — 88305 TISSUE EXAM BY PATHOLOGIST: CPT

## 2025-04-04 PROCEDURE — 36415 COLL VENOUS BLD VENIPUNCTURE: CPT

## 2025-04-04 PROCEDURE — 86789 WEST NILE VIRUS ANTIBODY: CPT

## 2025-04-04 PROCEDURE — 87205 SMEAR GRAM STAIN: CPT

## 2025-04-04 PROCEDURE — 89051 BODY FLUID CELL COUNT: CPT

## 2025-04-04 RX ORDER — LIDOCAINE HYDROCHLORIDE 20 MG/ML
INJECTION, SOLUTION EPIDURAL; INFILTRATION; INTRACAUDAL; PERINEURAL PRN
Status: COMPLETED | OUTPATIENT
Start: 2025-04-04 | End: 2025-04-04

## 2025-04-04 RX ORDER — SODIUM CHLORIDE 9 MG/ML
INJECTION, SOLUTION INTRAVENOUS PRN
Status: DISCONTINUED | OUTPATIENT
Start: 2025-04-04 | End: 2025-04-06 | Stop reason: HOSPADM

## 2025-04-04 RX ORDER — OXYCODONE AND ACETAMINOPHEN 5; 325 MG/1; MG/1
1 TABLET ORAL EVERY 4 HOURS PRN
Refills: 0 | Status: DISCONTINUED | OUTPATIENT
Start: 2025-04-04 | End: 2025-04-06 | Stop reason: HOSPADM

## 2025-04-04 RX ORDER — SODIUM CHLORIDE 0.9 % (FLUSH) 0.9 %
5-40 SYRINGE (ML) INJECTION EVERY 12 HOURS SCHEDULED
Status: DISCONTINUED | OUTPATIENT
Start: 2025-04-04 | End: 2025-04-06 | Stop reason: HOSPADM

## 2025-04-04 RX ORDER — OXYCODONE AND ACETAMINOPHEN 5; 325 MG/1; MG/1
2 TABLET ORAL EVERY 4 HOURS PRN
Refills: 0 | Status: DISCONTINUED | OUTPATIENT
Start: 2025-04-04 | End: 2025-04-06 | Stop reason: HOSPADM

## 2025-04-04 RX ORDER — SODIUM CHLORIDE 0.9 % (FLUSH) 0.9 %
5-40 SYRINGE (ML) INJECTION PRN
Status: DISCONTINUED | OUTPATIENT
Start: 2025-04-04 | End: 2025-04-06 | Stop reason: HOSPADM

## 2025-04-04 RX ADMIN — TOPIRAMATE 200 MG: 100 TABLET, FILM COATED ORAL at 08:26

## 2025-04-04 RX ADMIN — HYDROCODONE BITARTRATE AND ACETAMINOPHEN 2 TABLET: 5; 325 TABLET ORAL at 10:18

## 2025-04-04 RX ADMIN — ENOXAPARIN SODIUM 40 MG: 100 INJECTION SUBCUTANEOUS at 20:49

## 2025-04-04 RX ADMIN — ACYCLOVIR SODIUM 1000 MG: 50 INJECTION, SOLUTION INTRAVENOUS at 17:51

## 2025-04-04 RX ADMIN — ONDANSETRON 4 MG: 2 INJECTION, SOLUTION INTRAMUSCULAR; INTRAVENOUS at 13:01

## 2025-04-04 RX ADMIN — CIPROFLOXACIN HYDROCHLORIDE 500 MG: 500 TABLET, FILM COATED ORAL at 08:26

## 2025-04-04 RX ADMIN — CITALOPRAM HYDROBROMIDE 20 MG: 20 TABLET ORAL at 08:27

## 2025-04-04 RX ADMIN — OXYCODONE AND ACETAMINOPHEN 2 TABLET: 325; 5 TABLET ORAL at 15:47

## 2025-04-04 RX ADMIN — HYDROCODONE BITARTRATE AND ACETAMINOPHEN 2 TABLET: 5; 325 TABLET ORAL at 02:15

## 2025-04-04 RX ADMIN — HYDROCODONE BITARTRATE AND ACETAMINOPHEN 2 TABLET: 5; 325 TABLET ORAL at 06:06

## 2025-04-04 RX ADMIN — CIPROFLOXACIN HYDROCHLORIDE 500 MG: 500 TABLET, FILM COATED ORAL at 20:51

## 2025-04-04 RX ADMIN — PANTOPRAZOLE SODIUM 40 MG: 40 TABLET, DELAYED RELEASE ORAL at 06:06

## 2025-04-04 RX ADMIN — ENOXAPARIN SODIUM 40 MG: 100 INJECTION SUBCUTANEOUS at 08:25

## 2025-04-04 RX ADMIN — OXYCODONE AND ACETAMINOPHEN 2 TABLET: 325; 5 TABLET ORAL at 20:50

## 2025-04-04 RX ADMIN — LIDOCAINE HYDROCHLORIDE 5 ML: 20 INJECTION, SOLUTION EPIDURAL; INFILTRATION; INTRACAUDAL; PERINEURAL at 13:22

## 2025-04-04 RX ADMIN — POLYETHYLENE GLYCOL 3350 17 G: 17 POWDER, FOR SOLUTION ORAL at 20:50

## 2025-04-04 ASSESSMENT — PAIN DESCRIPTION - DESCRIPTORS
DESCRIPTORS: ACHING

## 2025-04-04 ASSESSMENT — PAIN SCALES - GENERAL
PAINLEVEL_OUTOF10: 8
PAINLEVEL_OUTOF10: 10
PAINLEVEL_OUTOF10: 4
PAINLEVEL_OUTOF10: 6
PAINLEVEL_OUTOF10: 10
PAINLEVEL_OUTOF10: 3
PAINLEVEL_OUTOF10: 8

## 2025-04-04 ASSESSMENT — PAIN DESCRIPTION - LOCATION
LOCATION: BACK
LOCATION: HEAD
LOCATION: HEAD
LOCATION: BACK;HEAD
LOCATION: HEAD
LOCATION: HIP;HEAD

## 2025-04-04 ASSESSMENT — PAIN DESCRIPTION - ORIENTATION
ORIENTATION: RIGHT;LEFT
ORIENTATION: MID
ORIENTATION: LOWER

## 2025-04-04 ASSESSMENT — PAIN SCALES - WONG BAKER: WONGBAKER_NUMERICALRESPONSE: NO HURT

## 2025-04-04 NOTE — H&P
Formulation and discussion of sedation / procedure plans, risks, benefits, side effects and alternatives with patient and/or responsible adult completed.    History and Physical reviewed and unchanged.    Electronically signed by Kaden Dorantes MD on 4/4/25 at 1:27 PM EDT   
Last Year: Never true   Transportation Needs: No Transportation Needs (3/5/2025)    PRAPARE - Transportation     Lack of Transportation (Medical): No     Lack of Transportation (Non-Medical): No   Physical Activity: Insufficiently Active (2/27/2023)    Exercise Vital Sign     Days of Exercise per Week: 2 days     Minutes of Exercise per Session: 20 min   Intimate Partner Violence: Not At Risk (2/27/2023)    Humiliation, Afraid, Rape, and Kick questionnaire     Fear of Current or Ex-Partner: No     Emotionally Abused: No     Physically Abused: No     Sexually Abused: No   Housing Stability: Low Risk  (3/5/2025)    Housing Stability Vital Sign     Unable to Pay for Housing in the Last Year: No     Number of Times Moved in the Last Year: 0     Homeless in the Last Year: No      Electronically signed by Malachi Mueller PA-C on 4/2/2025 at 3:38 PM.

## 2025-04-04 NOTE — CARE COORDINATION
4/4/25, 1:48 PM EDT    DISCHARGE ON GOING EVALUATION    Poncho Jasso       [facility] day: 0  Location: 8A-07/007-A Reason for admit: Hypomagnesemia [E83.42]     Procedures: 4/4 LP    Imaging since last note: no new    Barriers to Discharge: Continue PO Cipro. LP today for headaches. XR Hip/Plv ordered.     PCP: Elizabeth Drake DO  No data recorded  Patient Goals/Plan/Treatment Preferences: From home alone. Denied needs.         Potential discharge over the weekend.     4/4/25, 1:52 PM EDT    Patient goals/plan/ treatment preferences discussed by  and .  Patient goals/plan/ treatment preferences reviewed with patient/ family.  Patient/ family verbalize understanding of discharge plan and are in agreement with goal/plan/treatment preferences.  Understanding was demonstrated using the teach back method.  AVS provided by RN at time of discharge, which includes all necessary medical information pertaining to the patients current course of illness, treatment, post-discharge goals of care, and treatment preferences.     Services At/After Discharge: None

## 2025-04-04 NOTE — PLAN OF CARE
Problem: Discharge Planning  Goal: Discharge to home or other facility with appropriate resources  4/4/2025 0805 by Jaclyn Wills, RN  Outcome: Adequate for Discharge  Flowsheets (Taken 4/4/2025 0803)  Discharge to home or other facility with appropriate resources:   Identify barriers to discharge with patient and caregiver   Identify discharge learning needs (meds, wound care, etc)   Arrange for needed discharge resources and transportation as appropriate   Refer to discharge planning if patient needs post-hospital services based on physician order or complex needs related to functional status, cognitive ability or social support system     Problem: Pain  Goal: Verbalizes/displays adequate comfort level or baseline comfort level  4/4/2025 0805 by Jaclyn Wills, RN  Outcome: Progressing  Flowsheets (Taken 4/4/2025 0805)  Verbalizes/displays adequate comfort level or baseline comfort level:   Encourage patient to monitor pain and request assistance   Assess pain using appropriate pain scale   Administer analgesics based on type and severity of pain and evaluate response   Implement non-pharmacological measures as appropriate and evaluate response   Notify Licensed Independent Practitioner if interventions unsuccessful or patient reports new pain   Consider cultural and social influences on pain and pain management

## 2025-04-05 LAB
ANION GAP SERPL CALC-SCNC: 10 MEQ/L (ref 8–16)
BASOPHILS ABSOLUTE: 0 THOU/MM3 (ref 0–0.1)
BASOPHILS NFR BLD AUTO: 0.4 %
BUN SERPL-MCNC: 9 MG/DL (ref 8–23)
CALCIUM SERPL-MCNC: 9 MG/DL (ref 8.6–10)
CHLORIDE SERPL-SCNC: 102 MEQ/L (ref 98–111)
CO2 SERPL-SCNC: 23 MEQ/L (ref 22–29)
CREAT SERPL-MCNC: 0.7 MG/DL (ref 0.5–0.9)
DEPRECATED RDW RBC AUTO: 44.8 FL (ref 35–45)
EOSINOPHIL NFR BLD AUTO: 5.2 %
EOSINOPHILS ABSOLUTE: 0.4 THOU/MM3 (ref 0–0.4)
ERYTHROCYTE [DISTWIDTH] IN BLOOD BY AUTOMATED COUNT: 13.2 % (ref 11.5–14.5)
GFR SERPL CREATININE-BSD FRML MDRD: > 90 ML/MIN/1.73M2
GLUCOSE SERPL-MCNC: 86 MG/DL (ref 74–109)
HCT VFR BLD AUTO: 40.4 % (ref 37–47)
HGB BLD-MCNC: 13 GM/DL (ref 12–16)
IMM GRANULOCYTES # BLD AUTO: 0.01 THOU/MM3 (ref 0–0.07)
IMM GRANULOCYTES NFR BLD AUTO: 0.1 %
LYMPHOCYTES ABSOLUTE: 3 THOU/MM3 (ref 1–4.8)
LYMPHOCYTES NFR BLD AUTO: 41 %
MCH RBC QN AUTO: 29.8 PG (ref 26–33)
MCHC RBC AUTO-ENTMCNC: 32.2 GM/DL (ref 32.2–35.5)
MCV RBC AUTO: 92.7 FL (ref 81–99)
MONOCYTES ABSOLUTE: 0.6 THOU/MM3 (ref 0.4–1.3)
MONOCYTES NFR BLD AUTO: 7.8 %
NEUTROPHILS ABSOLUTE: 3.4 THOU/MM3 (ref 1.8–7.7)
NEUTROPHILS NFR BLD AUTO: 45.5 %
NRBC BLD AUTO-RTO: 0 /100 WBC
PLATELET # BLD AUTO: 365 THOU/MM3 (ref 130–400)
PMV BLD AUTO: 9.4 FL (ref 9.4–12.4)
POTASSIUM SERPL-SCNC: 4.5 MEQ/L (ref 3.5–5.2)
RBC # BLD AUTO: 4.36 MILL/MM3 (ref 4.2–5.4)
SODIUM SERPL-SCNC: 135 MEQ/L (ref 135–145)
WBC # BLD AUTO: 7.4 THOU/MM3 (ref 4.8–10.8)

## 2025-04-05 PROCEDURE — 85025 COMPLETE CBC W/AUTO DIFF WBC: CPT

## 2025-04-05 PROCEDURE — 99232 SBSQ HOSP IP/OBS MODERATE 35: CPT

## 2025-04-05 PROCEDURE — 80048 BASIC METABOLIC PNL TOTAL CA: CPT

## 2025-04-05 PROCEDURE — 6370000000 HC RX 637 (ALT 250 FOR IP)

## 2025-04-05 PROCEDURE — 2580000003 HC RX 258

## 2025-04-05 PROCEDURE — 36415 COLL VENOUS BLD VENIPUNCTURE: CPT

## 2025-04-05 PROCEDURE — 2500000003 HC RX 250 WO HCPCS: Performed by: STUDENT IN AN ORGANIZED HEALTH CARE EDUCATION/TRAINING PROGRAM

## 2025-04-05 PROCEDURE — 2500000003 HC RX 250 WO HCPCS

## 2025-04-05 PROCEDURE — 1200000000 HC SEMI PRIVATE

## 2025-04-05 PROCEDURE — 6360000002 HC RX W HCPCS: Performed by: STUDENT IN AN ORGANIZED HEALTH CARE EDUCATION/TRAINING PROGRAM

## 2025-04-05 PROCEDURE — 6360000002 HC RX W HCPCS

## 2025-04-05 RX ORDER — DOCUSATE SODIUM 100 MG/1
100 CAPSULE, LIQUID FILLED ORAL DAILY
Status: DISCONTINUED | OUTPATIENT
Start: 2025-04-05 | End: 2025-04-06 | Stop reason: HOSPADM

## 2025-04-05 RX ORDER — POLYETHYLENE GLYCOL 3350 17 G/17G
17 POWDER, FOR SOLUTION ORAL 2 TIMES DAILY
Status: DISCONTINUED | OUTPATIENT
Start: 2025-04-05 | End: 2025-04-06 | Stop reason: HOSPADM

## 2025-04-05 RX ORDER — POLYETHYLENE GLYCOL 3350 17 G/17G
17 POWDER, FOR SOLUTION ORAL 2 TIMES DAILY PRN
Status: DISCONTINUED | OUTPATIENT
Start: 2025-04-05 | End: 2025-04-05

## 2025-04-05 RX ADMIN — ACYCLOVIR SODIUM 1000 MG: 50 INJECTION, SOLUTION INTRAVENOUS at 01:36

## 2025-04-05 RX ADMIN — POLYETHYLENE GLYCOL 3350 17 G: 17 POWDER, FOR SOLUTION ORAL at 11:41

## 2025-04-05 RX ADMIN — DOCUSATE SODIUM 100 MG: 100 CAPSULE, LIQUID FILLED ORAL at 11:41

## 2025-04-05 RX ADMIN — OXYCODONE AND ACETAMINOPHEN 2 TABLET: 325; 5 TABLET ORAL at 20:05

## 2025-04-05 RX ADMIN — CIPROFLOXACIN HYDROCHLORIDE 500 MG: 500 TABLET, FILM COATED ORAL at 20:04

## 2025-04-05 RX ADMIN — ACYCLOVIR SODIUM 1000 MG: 50 INJECTION, SOLUTION INTRAVENOUS at 17:15

## 2025-04-05 RX ADMIN — SODIUM CHLORIDE, PRESERVATIVE FREE 10 ML: 5 INJECTION INTRAVENOUS at 20:04

## 2025-04-05 RX ADMIN — POLYETHYLENE GLYCOL 3350 17 G: 17 POWDER, FOR SOLUTION ORAL at 20:05

## 2025-04-05 RX ADMIN — ENOXAPARIN SODIUM 40 MG: 100 INJECTION SUBCUTANEOUS at 08:23

## 2025-04-05 RX ADMIN — OXYCODONE AND ACETAMINOPHEN 2 TABLET: 325; 5 TABLET ORAL at 01:37

## 2025-04-05 RX ADMIN — SODIUM CHLORIDE, PRESERVATIVE FREE 10 ML: 5 INJECTION INTRAVENOUS at 08:24

## 2025-04-05 RX ADMIN — CIPROFLOXACIN HYDROCHLORIDE 500 MG: 500 TABLET, FILM COATED ORAL at 08:23

## 2025-04-05 RX ADMIN — ENOXAPARIN SODIUM 40 MG: 100 INJECTION SUBCUTANEOUS at 20:05

## 2025-04-05 RX ADMIN — PANTOPRAZOLE SODIUM 40 MG: 40 TABLET, DELAYED RELEASE ORAL at 06:50

## 2025-04-05 RX ADMIN — SODIUM CHLORIDE, PRESERVATIVE FREE 10 ML: 5 INJECTION INTRAVENOUS at 08:28

## 2025-04-05 RX ADMIN — CITALOPRAM HYDROBROMIDE 20 MG: 20 TABLET ORAL at 08:24

## 2025-04-05 RX ADMIN — ACYCLOVIR SODIUM 1000 MG: 50 INJECTION, SOLUTION INTRAVENOUS at 08:31

## 2025-04-05 RX ADMIN — TOPIRAMATE 200 MG: 100 TABLET, FILM COATED ORAL at 08:23

## 2025-04-05 RX ADMIN — OXYCODONE AND ACETAMINOPHEN 2 TABLET: 325; 5 TABLET ORAL at 15:09

## 2025-04-05 RX ADMIN — OXYCODONE AND ACETAMINOPHEN 2 TABLET: 325; 5 TABLET ORAL at 08:24

## 2025-04-05 ASSESSMENT — PAIN SCALES - GENERAL
PAINLEVEL_OUTOF10: 8

## 2025-04-05 ASSESSMENT — PAIN DESCRIPTION - LOCATION
LOCATION: HEAD
LOCATION: HEAD;BACK
LOCATION: BACK;HEAD
LOCATION: HEAD

## 2025-04-05 ASSESSMENT — PAIN - FUNCTIONAL ASSESSMENT
PAIN_FUNCTIONAL_ASSESSMENT: ACTIVITIES ARE NOT PREVENTED
PAIN_FUNCTIONAL_ASSESSMENT: ACTIVITIES ARE NOT PREVENTED

## 2025-04-05 ASSESSMENT — PAIN DESCRIPTION - DESCRIPTORS
DESCRIPTORS: ACHING;STABBING;THROBBING
DESCRIPTORS: ACHING;SORE
DESCRIPTORS: ACHING

## 2025-04-05 ASSESSMENT — PAIN DESCRIPTION - ONSET
ONSET: ON-GOING
ONSET: ON-GOING

## 2025-04-05 ASSESSMENT — PAIN SCALES - WONG BAKER: WONGBAKER_NUMERICALRESPONSE: NO HURT

## 2025-04-05 ASSESSMENT — PAIN DESCRIPTION - PAIN TYPE
TYPE: CHRONIC PAIN
TYPE: CHRONIC PAIN

## 2025-04-05 ASSESSMENT — PAIN DESCRIPTION - ORIENTATION
ORIENTATION: LOWER
ORIENTATION: MID

## 2025-04-05 ASSESSMENT — PAIN DESCRIPTION - FREQUENCY
FREQUENCY: CONTINUOUS
FREQUENCY: CONTINUOUS

## 2025-04-05 NOTE — PLAN OF CARE
Problem: Discharge Planning  Goal: Discharge to home or other facility with appropriate resources  Outcome: Progressing  Flowsheets (Taken 4/5/2025 1534)  Discharge to home or other facility with appropriate resources: Identify barriers to discharge with patient and caregiver     Problem: Safety - Adult  Goal: Free from fall injury  Outcome: Progressing  Flowsheets (Taken 4/5/2025 1534)  Free From Fall Injury: Instruct family/caregiver on patient safety     Problem: Pain  Goal: Verbalizes/displays adequate comfort level or baseline comfort level  Outcome: Progressing  Flowsheets (Taken 4/5/2025 1534)  Verbalizes/displays adequate comfort level or baseline comfort level: Encourage patient to monitor pain and request assistance

## 2025-04-06 VITALS
BODY MASS INDEX: 56.22 KG/M2 | RESPIRATION RATE: 18 BRPM | TEMPERATURE: 98.6 F | DIASTOLIC BLOOD PRESSURE: 77 MMHG | HEART RATE: 80 BPM | SYSTOLIC BLOOD PRESSURE: 140 MMHG | OXYGEN SATURATION: 99 % | WEIGHT: 293 LBS

## 2025-04-06 LAB
WNV IGG CSF IA-ACNC: NORMAL
WNV IGM CSF IA-ACNC: NORMAL

## 2025-04-06 PROCEDURE — 6360000002 HC RX W HCPCS: Performed by: STUDENT IN AN ORGANIZED HEALTH CARE EDUCATION/TRAINING PROGRAM

## 2025-04-06 PROCEDURE — 6360000002 HC RX W HCPCS

## 2025-04-06 PROCEDURE — 99239 HOSP IP/OBS DSCHRG MGMT >30: CPT

## 2025-04-06 PROCEDURE — 6370000000 HC RX 637 (ALT 250 FOR IP)

## 2025-04-06 PROCEDURE — 2580000003 HC RX 258

## 2025-04-06 PROCEDURE — 2500000003 HC RX 250 WO HCPCS

## 2025-04-06 RX ORDER — PSEUDOEPHEDRINE HCL 30 MG
100 TABLET ORAL DAILY
Qty: 30 CAPSULE | Refills: 0 | Status: SHIPPED | OUTPATIENT
Start: 2025-04-07

## 2025-04-06 RX ORDER — VALACYCLOVIR HYDROCHLORIDE 1 G/1
1000 TABLET, FILM COATED ORAL 3 TIMES DAILY
Qty: 24 TABLET | Refills: 0 | Status: SHIPPED | OUTPATIENT
Start: 2025-04-06 | End: 2025-04-06

## 2025-04-06 RX ORDER — OXYCODONE AND ACETAMINOPHEN 5; 325 MG/1; MG/1
1 TABLET ORAL EVERY 6 HOURS PRN
Qty: 12 TABLET | Refills: 0 | Status: SHIPPED | OUTPATIENT
Start: 2025-04-06 | End: 2025-04-09

## 2025-04-06 RX ORDER — VALACYCLOVIR HYDROCHLORIDE 1 G/1
1000 TABLET, FILM COATED ORAL 3 TIMES DAILY
Qty: 24 TABLET | Refills: 0 | Status: SHIPPED | OUTPATIENT
Start: 2025-04-06 | End: 2025-04-14

## 2025-04-06 RX ORDER — PSEUDOEPHEDRINE HCL 30 MG
100 TABLET ORAL DAILY
Qty: 30 CAPSULE | Refills: 0 | Status: SHIPPED | OUTPATIENT
Start: 2025-04-07 | End: 2025-04-06

## 2025-04-06 RX ORDER — POLYETHYLENE GLYCOL 3350 17 G/17G
17 POWDER, FOR SOLUTION ORAL 2 TIMES DAILY
Qty: 527 G | Refills: 1 | Status: SHIPPED | OUTPATIENT
Start: 2025-04-06 | End: 2025-04-06

## 2025-04-06 RX ORDER — POLYETHYLENE GLYCOL 3350 17 G/17G
17 POWDER, FOR SOLUTION ORAL 2 TIMES DAILY
Qty: 60 PACKET | Refills: 0 | Status: SHIPPED | OUTPATIENT
Start: 2025-04-06 | End: 2025-05-06

## 2025-04-06 RX ORDER — SODIUM PHOSPHATE, DIBASIC AND SODIUM PHOSPHATE, MONOBASIC 7; 19 G/230ML; G/230ML
1 ENEMA RECTAL
Status: DISCONTINUED | OUTPATIENT
Start: 2025-04-06 | End: 2025-04-06 | Stop reason: HOSPADM

## 2025-04-06 RX ADMIN — ACYCLOVIR SODIUM 1000 MG: 50 INJECTION, SOLUTION INTRAVENOUS at 00:57

## 2025-04-06 RX ADMIN — SODIUM CHLORIDE, PRESERVATIVE FREE 10 ML: 5 INJECTION INTRAVENOUS at 08:49

## 2025-04-06 RX ADMIN — OXYCODONE AND ACETAMINOPHEN 2 TABLET: 325; 5 TABLET ORAL at 00:53

## 2025-04-06 RX ADMIN — ENOXAPARIN SODIUM 40 MG: 100 INJECTION SUBCUTANEOUS at 08:52

## 2025-04-06 RX ADMIN — TOPIRAMATE 200 MG: 100 TABLET, FILM COATED ORAL at 08:49

## 2025-04-06 RX ADMIN — DOCUSATE SODIUM 100 MG: 100 CAPSULE, LIQUID FILLED ORAL at 08:50

## 2025-04-06 RX ADMIN — PANTOPRAZOLE SODIUM 40 MG: 40 TABLET, DELAYED RELEASE ORAL at 06:15

## 2025-04-06 RX ADMIN — POLYETHYLENE GLYCOL 3350 17 G: 17 POWDER, FOR SOLUTION ORAL at 08:49

## 2025-04-06 RX ADMIN — SODIUM PHOSPHATE, DIBASIC AND SODIUM PHOSPHATE, MONOBASIC 1 ENEMA: 7; 19 ENEMA RECTAL at 10:53

## 2025-04-06 RX ADMIN — OXYCODONE AND ACETAMINOPHEN 2 TABLET: 325; 5 TABLET ORAL at 06:15

## 2025-04-06 RX ADMIN — CIPROFLOXACIN HYDROCHLORIDE 500 MG: 500 TABLET, FILM COATED ORAL at 08:50

## 2025-04-06 RX ADMIN — ACYCLOVIR SODIUM 1000 MG: 50 INJECTION, SOLUTION INTRAVENOUS at 08:59

## 2025-04-06 RX ADMIN — CITALOPRAM HYDROBROMIDE 20 MG: 20 TABLET ORAL at 08:50

## 2025-04-06 ASSESSMENT — PAIN DESCRIPTION - DESCRIPTORS
DESCRIPTORS: ACHING
DESCRIPTORS: ACHING

## 2025-04-06 ASSESSMENT — PAIN DESCRIPTION - LOCATION
LOCATION: HEAD
LOCATION: HEAD

## 2025-04-06 ASSESSMENT — PAIN SCALES - WONG BAKER: WONGBAKER_NUMERICALRESPONSE: NO HURT

## 2025-04-06 NOTE — PLAN OF CARE
Problem: Discharge Planning  Goal: Discharge to home or other facility with appropriate resources  4/5/2025 2016 by Lucretia Cardenas RN  Outcome: Progressing  4/5/2025 1534 by Matilda Shelby RN  Outcome: Progressing  Flowsheets (Taken 4/5/2025 1534)  Discharge to home or other facility with appropriate resources: Identify barriers to discharge with patient and caregiver     Problem: Safety - Adult  Goal: Free from fall injury  4/5/2025 2016 by Lucretia Cardenas RN  Outcome: Progressing  4/5/2025 1534 by Matilda Shelby RN  Outcome: Progressing  Flowsheets (Taken 4/5/2025 1534)  Free From Fall Injury: Instruct family/caregiver on patient safety     Problem: Pain  Goal: Verbalizes/displays adequate comfort level or baseline comfort level  4/5/2025 2016 by Lucretia Cardenas RN  Outcome: Progressing  4/5/2025 1534 by Matilda Shelby RN  Outcome: Progressing  Flowsheets (Taken 4/5/2025 1534)  Verbalizes/displays adequate comfort level or baseline comfort level: Encourage patient to monitor pain and request assistance

## 2025-04-06 NOTE — PROGRESS NOTES
Hospitalist Progress Note      Patient:  Poncho Jasso 49 y.o. female       : 1975  Unit/Bed:[facility]    Date of Admission: 2025      ASSESSMENT AND PLAN    Active Problems  UTI with vaginitis involving Candida  Patient is positive for UTI ECG, primary care in ED over the last few days.  Tested positive for Candida vaginitis during primary care visit.  Initially prescribed Flagyl, reports she did not take.  Switched to ciprofloxacin.  Continue Cipro x 7 days  S/p1 dose 150 mg fluconazole  Headache, persistent  History of migraine, reports this is different. Of note, patient with nausea/bodyaches as well  CTh negative for acute process at [facility]  [place], toradol for pain management.    Given pt's persistent HA, +kernig sign will obtain LP after discussing with pt  IR procedure requested with meningitis labs ordered  ESR and CRP elevated  Lumbar BP with radiation into hips  Notes no injury, started in 24 hrs PTA  Tenderness to palpation at SI joint, appears MSK in nature  No known injury  Will obtain pelvic xray given persistent pain  Lidocaine patch, Norco    Resolved Problems  Hypocalcemia: Ionized calcium normalized with calcium replacement.  Hypoalbuminemia resolved  Hypomagnesemia, resolved  Patient found to have hypomagnesemia during workup at [facility].  Found to be 1.1, repeat 1.6 on arrival.  Increased to 2.2   May be cause for poor oral intake given nausea x 1 month per patient.  Patient also on omeprazole outpatient which could be causing hypomagnesemia    Chronic Conditions (reviewed, stable, and home medications resumed, unless otherwise stated)  GERD: On omeprazole, recommend transitioning to H2 blocker at discharge given hypomagnesemia  Migraines: Topiramate  Anxiety: Celexa      LDA: []CVC / []PICC / []Midline / []Tidwell / []Drains / []Mediport / [x]None  Antibiotics: Ciprofloxacin  Steroids: None  Labs (still needed?): [x]Yes / []No  IVF (still needed?): []Yes / 
    Hospitalist Progress Note      Patient:  Poncho Jasso 49 y.o. female       : 1975  [facility]    Date of Admission: 2025      ASSESSMENT AND PLAN    Active Problems  Hypomagnesemia, improved  Patient found to have hypomagnesemia during workup at [facility].  Found to be 1.1, repeat 1.6 on arrival.  Increased to 1.8, give additional 2 g.  Repeat mag level in the a.m.  May be cause for poor oral intake given nausea x 1 month per patient.  Patient also on omeprazole outpatient which could be causing hypomagnesemia  UTI with vaginitis involving Candida  Patient is positive for UTI ECG, primary care in ED over the last few days.  Tested positive for Candida vaginitis during primary care visit.  Initially prescribed Flagyl, reports she did not take.  Switched to ciprofloxacin.  Continue Cipro x 7 days  S/p1 dose 150 mg fluconazole  Headache, persistent  History of migraine, reports this is different. Of note, patient with nausea/bodyaches as well  CTh negative for acute process at [facility]  [place], toradol for pain management.  Percocet times  Low clinical suspicion for meningitis given ongoing x3 days, afebrile, no altered mentation, without leukocytosis and negative neck rigidity  Lumbar BP with radiation into hips  Notes no injury, started in 24 hrs PTA  Tenderness to palpation at SI joint, appears MSK in nature  No known injury  Lidocaine patch, Norco    Resolved Problems  Hypocalcemia: Ionized calcium normalized with calcium replacement.  Hypoalbuminemia resolved    Chronic Conditions (reviewed, stable, and home medications resumed, unless otherwise stated)  GERD: On omeprazole, recommend transitioning to H2 blocker at discharge given hypomagnesemia  Migraines: Topiramate  Anxiety: Celexa      LDA: []CVC / []PICC / []Midline / []Tidwell / []Drains / []Mediport / [x]None  Antibiotics: Ciprofloxacin  Steroids: None  Labs (still needed?): [x]Yes / []No  IVF (still needed?): []Yes / 
    Pharmacist Review and Automatic Dose Adjustment of Prophylactic Enoxaparin    The reviewing pharmacist has made an adjustment to the ordered enoxaparin dose or converted to UFH per the approved [facility] protocol and table as identified below.      Poncho Jasso is a 49 y.o. female.     Estimated Creatinine Clearance: 180 mL/min (based on SCr of 0.66 mg/dL).    Height:   Ht Readings from Last 1 Encounters:   03/31/25 1.753 m (5' 9\")       Weight:  Wt Readings from Last 3 Encounters:   03/31/25 (!) 173.8 kg (383 lb 3.2 oz)   03/28/25 (!) 171 kg (377 lb)   03/24/25 (!) 171 kg (377 lb)         Plan: Based upon the patient's weight and renal function    Ordered: Enoxaparin 40mg SUBQ Daily    Changed/converted to    New Order: Enoxaparin 40mg SUBQ BID    Thank you,  Gracy Romero, [facility]  4/2/2025, 3:32 PM  
1308 Patient received in IR for lumbar puncture.  1312 This procedure has been fully reviewed with the patient and written informed consent has been obtained.  1321 Procedure started with   1325 Samples obtained for testing. 14ml of clear fluid collected.  1326 Procedure completed; patient tolerated well. Dressing to lumbar; no bleeding noted.  1329 Patient on bed; comfort ensured.  1337 Patient taken to 8A via bed/transport. Pt alert and oriented x3; follows commands. Skin pink, warm, and dry. Respirations easy, regular, and nonlabored.  Encouraged patient to lay flat on her back, but she states that she is more comfortable on her side.  She understands the risks and benefits.  
Discharge teaching and instructions for diagnosis/procedure of hypomagnesemia and meningitis completed with patient using teachback method. AVS reviewed. Printed prescriptions given to patient. Patient voiced understanding regarding prescriptions, follow up appointments, and care of self at home. Discharged in a wheelchair to  home with support per self    
Patient made aware several times of importance of remaining on bedrest with head flat x2 hours s/p LP. Education provided on Importance. Patient states \"I don't care, I am in pain\". Offered [place], patient states she does not want it that she has, \"taken it for years for her migraines\". Provider updated. At this time patient is lying on back with HOB flat.  
Poncho Jasso is a 49 y.o. female from [facility] ED with Dr Lopez requesting transfer. Poncho has a PMHx EBV, GERD with hiatal hernia, asthma, IBS. She arrived to their ED today with c/o HA, nausea, body aches. She was (-) flu, (-) Covid, (-) RSV. She is being treated by PCP for a UTI, started with Macrobid and was changed to ciprofloxacin yesterday. CBC normal, Na+ 138, LFT normal, phosphorous normal, magnesium 1.1. They are requesting transfer for hypomagnesemia. Accepted in transfer under Dr Frost/Dr Kinney.   
Spiritual Health History and Assessment/Progress Note  [facility]    Initial Encounter,  ,  ,      Name: Poncho Jasso MRN: [id number]    Age: 49 y.o.     Sex: female   Language: English   Bahai: Presbyterian   Hypomagnesemia     Date: 4/4/2025            Total Time Calculated: 10 min              Spiritual Assessment began in [facility]        Referral/Consult From: Rounding   Encounter Overview/Reason: Initial Encounter  Service Provided For: Patient    [place], Belief, Meaning:   Patient identifies as spiritual and has beliefs or practices that help with coping during difficult times  Family/Friends No family/friends present      Importance and Influence:  Patient has spiritual/personal beliefs that influence decisions regarding their health  Family/Friends No family/friends present    Community:  Patient is connected with a spiritual community and feels well-supported. Support system includes: Children, [place] Community, Friends, and Extended family  Family/Friends No family/friends present    Assessment and Plan of Care:     Patient Interventions include: Facilitated expression of thoughts and feelings, Explored spiritual coping/struggle/distress, Engaged in theological reflection, Affirmed coping skills/support systems, Provided sacramental/Baptist ritual, Facilitated life review and/ or legacy, and Other:    Family/Friends Interventions include: No family/friends present    Patient Plan of Care: Spiritual Care available upon further referral  Family/Friends Plan of Care: No family/friends present    Electronically signed by Chaplain Elyse on 4/4/2025 at 9:45 AM    
Alert and oriented, normal insight and thought content.         Labs/Radiology: See chart or assessment above.     Electronically signed by Ophelia Geiger PA-C on 4/5/2025 at 7:41 AM

## 2025-04-06 NOTE — DISCHARGE SUMMARY
Hospitalist Discharge Summary    Patient: Poncho Jasso  YOB: 1975  MRN: [id number]   Acct: [id number]    Primary Care Physician: Eilzabeth Drake DO    Admit date  4/2/2025    Discharge date:  4/6/25      Chief Complaint on presentation: Headache, nausea, back pain     Initial H&P and Hospital course:  Per H&P 4/02 \"Poncho Jasso is a 49 y.o. female with PMHx of migraines, adrenal disease, chronic nausea, who presents to [facility] with hypomagnesia.  Patient reports a history of migraines.  She says she started to have a headache yesterday however this 1 is significantly worse than previous.  She describes this as frontal pain with radiation onto scalp.  She describes it as a sharp or stabbing 10/10 pain that is constant however does wax and wane.  She states that Tylenol had helped previously.  She endorses light sensitivity as well.  She endorses fever and chills yesterday.  She states that last night she woke up with back and hip pain and describes a \"restless leg x 20 \"pain in her lumbar region that radiated into her hips.  She states that it was a 10/10 at its worst however now it is at a 7/10.  She reports that transition from sitting to standing aggravates slightly however getting in and out of car as well as going up and down stairs increases the pain significantly.  She states that bending over as well as heating pad helps the pain.  She presented to [facility] for said symptoms.  During workup she had CT of the head that revealed no acute intracranial process.  Her initial lab work revealed hypomagnesemia and ER states that they do not have telemetry abilities at hospital.  Admission was requested due to need for telemetry given electrolyte imbalance. \"       Hospital course: Patient was admitted on 4/2 with hypomagnesemia and hypocalcemia.  These electrolytes were corrected.  Patient continued to experience persistent headache and back pain.  Given symptoms patient

## 2025-04-07 ENCOUNTER — TELEPHONE (OUTPATIENT)
Dept: FAMILY MEDICINE CLINIC | Age: 50
End: 2025-04-07

## 2025-04-07 NOTE — TELEPHONE ENCOUNTER
Care Transitions Initial Follow Up Call    Outreach made within 2 business days of discharge: Yes    Patient: Poncho Jasso Patient : 1975   MRN: [id number]  Reason for Admission: HSV Meningitis  Discharge Date: 25       Spoke with: Poncho    Discharge department/facility: [facility] Interactive Patient Contact:  Was patient able to fill all prescriptions: Yes  Was patient instructed to bring all medications to the follow-up visit: Yes  Is patient taking all medications as directed in the discharge summary? Yes  Does patient understand their discharge instructions: Yes  Does patient have questions or concerns that need addressed prior to 7-14 day follow up office visit: no    Additional needs identified to be addressed with provider  No needs identified             Scheduled appointment with PCP within 7-14 days    Follow Up  Future Appointments   Date Time Provider Department Center   2025  2:00 PM Elizabeth Drake DO [facility] DEP       Fredy Marrero LPN

## 2025-04-07 NOTE — TELEPHONE ENCOUNTER
Care Transitions Initial Follow Up Call    Outreach made within 2 business days of discharge: Yes    Patient: Poncho Jasso     Patient : 1975     MRN: [id number]      Reason for Admission:  HSV Meningitis     Discharge Date: 25       Spoke with: called patient/ no answer- left message for patient to return call     Discharge department/facility: [facility] Interactive Patient Contact:  Was patient able to fill all prescriptions:   Was patient instructed to bring all medications to the follow-up visit:   Is patient taking all medications as directed in the discharge summary?   Does patient understand their discharge instructions:   Does patient have questions or concerns that need addressed prior to 7-14 day follow up office visit:     Additional needs identified to be addressed with provider               Scheduled appointment with PCP within 7-14 days    Follow Up  No future appointments.    SANDI GARZA MA

## 2025-04-08 LAB
BACTERIA BLD AEROBE CULT: NORMAL
BACTERIA BLD AEROBE CULT: NORMAL
VDRL CSF QL: NONREACTIVE

## 2025-04-08 NOTE — PROGRESS NOTES
Post-Discharge Transitional Care  Follow Up      Poncho Jasso   YOB: 1975    Date of Office Visit:  4/9/2025  Date of Hospital Admission: 4/2/25  Date of Hospital Discharge: 4/6/25  Risk of hospital readmission (high >=14%. 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  Treated with IV acyclovir and transition to oral Valtrex to complete a total of 10-day course (on day 5/10).  Advised to complete antiviral medication as prescribed.  Patient is unaware of previous HSV infections, so antibody testing completed today to see if this can give us any more information.  Recommended patient to contact her neurologist to update him on recent meningitis diagnosis and ask if she will need suppression therapy moving forward.  She was advised to recheck CBC and CMP again in 1-2 weeks.  Orders:  -     CBC with Auto Differential; Future  -     Comprehensive Metabolic Panel; Future  -     Herpes Simplex Virus (Hsv) II Glycoprotein Antibody IgG; Future    Hypomagnesemia  Comments:  Acute issue, resolved.  Continue magnesium 400 mg nightly.  Recheck CMP and magnesium level in 1-2 weeks.  Orders:  -     Comprehensive Metabolic Panel; Future  -     Magnesium; Future    Acute cystitis without hematuria  Comments:  Acute issue, resolved.  Completed ciprofloxacin while inpatient.    Vaginal yeast infection  Comments:  Acute issue, asymptomatic.  Plans to take dose of Diflucan after finishing antiviral therapy for meningitis.    Hospital discharge follow-up  -     FL DISCHARGE MEDS RECONCILED W/ CURRENT

## 2025-04-09 ENCOUNTER — OFFICE VISIT (OUTPATIENT)
Dept: FAMILY MEDICINE CLINIC | Age: 50
End: 2025-04-09

## 2025-04-09 VITALS
WEIGHT: 293 LBS | HEART RATE: 105 BPM | RESPIRATION RATE: 20 BRPM | SYSTOLIC BLOOD PRESSURE: 120 MMHG | BODY MASS INDEX: 54.79 KG/M2 | DIASTOLIC BLOOD PRESSURE: 70 MMHG | OXYGEN SATURATION: 97 %

## 2025-04-09 DIAGNOSIS — N30.00 ACUTE CYSTITIS WITHOUT HEMATURIA: ICD-10-CM

## 2025-04-09 DIAGNOSIS — Z09 HOSPITAL DISCHARGE FOLLOW-UP: ICD-10-CM

## 2025-04-09 DIAGNOSIS — E83.42 HYPOMAGNESEMIA: ICD-10-CM

## 2025-04-09 DIAGNOSIS — B37.31 VAGINAL YEAST INFECTION: ICD-10-CM

## 2025-04-09 DIAGNOSIS — B00.3 MENINGITIS DUE TO HERPES SIMPLEX VIRUS TYPE 2 (HSV-2): Primary | ICD-10-CM

## 2025-04-09 LAB
BACTERIA CSF CULT: NORMAL
BACTERIA SPEC ANAEROBE CULT: NORMAL
GRAM STN SPEC: NORMAL

## 2025-04-09 ASSESSMENT — ENCOUNTER SYMPTOMS
SHORTNESS OF BREATH: 0
NAUSEA: 1
DIARRHEA: 0
COUGH: 0
BACK PAIN: 1
SORE THROAT: 0
RHINORRHEA: 0
ABDOMINAL PAIN: 0
VOMITING: 0

## 2025-04-12 ENCOUNTER — HOSPITAL ENCOUNTER (EMERGENCY)
Age: 50
Discharge: HOME | End: 2025-04-13
Attending: EMERGENCY MEDICINE
Payer: COMMERCIAL

## 2025-04-12 VITALS
RESPIRATION RATE: 16 BRPM | HEIGHT: 69 IN | SYSTOLIC BLOOD PRESSURE: 153 MMHG | BODY MASS INDEX: 43.4 KG/M2 | OXYGEN SATURATION: 96 % | WEIGHT: 293 LBS | TEMPERATURE: 98.3 F | DIASTOLIC BLOOD PRESSURE: 76 MMHG | HEART RATE: 80 BPM

## 2025-04-12 DIAGNOSIS — R52 GENERALIZED PAIN: Primary | ICD-10-CM

## 2025-04-12 LAB
ANION GAP SERPL CALC-SCNC: 11 MEQ/L (ref 8–16)
BASOPHILS ABSOLUTE: 0 THOU/MM3 (ref 0–0.1)
BASOPHILS NFR BLD AUTO: 0.2 %
BUN SERPL-MCNC: 14 MG/DL (ref 8–23)
CALCIUM SERPL-MCNC: 8.7 MG/DL (ref 8.6–10)
CHLORIDE SERPL-SCNC: 106 MEQ/L (ref 98–111)
CO2 SERPL-SCNC: 21 MEQ/L (ref 22–29)
CREAT SERPL-MCNC: 0.8 MG/DL (ref 0.5–0.9)
DEPRECATED RDW RBC AUTO: 46.9 FL (ref 35–45)
EOSINOPHIL NFR BLD AUTO: 3.2 %
EOSINOPHILS ABSOLUTE: 0.4 THOU/MM3 (ref 0–0.4)
ERYTHROCYTE [DISTWIDTH] IN BLOOD BY AUTOMATED COUNT: 13.9 % (ref 11.5–14.5)
GFR SERPL CREATININE-BSD FRML MDRD: > 90 ML/MIN/1.73M2
GLUCOSE SERPL-MCNC: 105 MG/DL (ref 74–109)
HCT VFR BLD AUTO: 38.5 % (ref 37–47)
HGB BLD-MCNC: 12.3 GM/DL (ref 12–16)
IMM GRANULOCYTES # BLD AUTO: 0.04 THOU/MM3 (ref 0–0.07)
IMM GRANULOCYTES NFR BLD AUTO: 0.3 %
LACTIC ACID, SEPSIS: 1.1 MMOL/L (ref 0.5–1.9)
LYMPHOCYTES ABSOLUTE: 3.9 THOU/MM3 (ref 1–4.8)
LYMPHOCYTES NFR BLD AUTO: 29.9 %
MAGNESIUM SERPL-MCNC: 1.8 MG/DL (ref 1.6–2.6)
MCH RBC QN AUTO: 29.6 PG (ref 26–33)
MCHC RBC AUTO-ENTMCNC: 31.9 GM/DL (ref 32.2–35.5)
MCV RBC AUTO: 92.5 FL (ref 81–99)
MONOCYTES ABSOLUTE: 0.7 THOU/MM3 (ref 0.4–1.3)
MONOCYTES NFR BLD AUTO: 5.5 %
NEUTROPHILS ABSOLUTE: 8 THOU/MM3 (ref 1.8–7.7)
NEUTROPHILS NFR BLD AUTO: 60.9 %
NRBC BLD AUTO-RTO: 0 /100 WBC
OSMOLALITY SERPL CALC.SUM OF ELEC: 276.5 MOSMOL/KG (ref 275–300)
PLATELET # BLD AUTO: 424 THOU/MM3 (ref 130–400)
PMV BLD AUTO: 9.2 FL (ref 9.4–12.4)
POTASSIUM SERPL-SCNC: 4 MEQ/L (ref 3.5–5.2)
RBC # BLD AUTO: 4.16 MILL/MM3 (ref 4.2–5.4)
SODIUM SERPL-SCNC: 138 MEQ/L (ref 135–145)
WBC # BLD AUTO: 13.2 THOU/MM3 (ref 4.8–10.8)

## 2025-04-12 PROCEDURE — 83735 ASSAY OF MAGNESIUM: CPT

## 2025-04-12 PROCEDURE — 85025 COMPLETE CBC W/AUTO DIFF WBC: CPT

## 2025-04-12 PROCEDURE — 83605 ASSAY OF LACTIC ACID: CPT

## 2025-04-12 PROCEDURE — 82248 BILIRUBIN DIRECT: CPT

## 2025-04-12 PROCEDURE — 99283 EMERGENCY DEPT VISIT LOW MDM: CPT

## 2025-04-12 PROCEDURE — 6370000000 HC RX 637 (ALT 250 FOR IP)

## 2025-04-12 PROCEDURE — 36415 COLL VENOUS BLD VENIPUNCTURE: CPT

## 2025-04-12 PROCEDURE — 80053 COMPREHEN METABOLIC PANEL: CPT

## 2025-04-12 RX ORDER — HYDROCODONE BITARTRATE AND ACETAMINOPHEN 5; 325 MG/1; MG/1
1 TABLET ORAL ONCE
Status: COMPLETED | OUTPATIENT
Start: 2025-04-12 | End: 2025-04-12

## 2025-04-12 RX ADMIN — HYDROCODONE BITARTRATE AND ACETAMINOPHEN 1 TABLET: 5; 325 TABLET ORAL at 22:53

## 2025-04-12 ASSESSMENT — PAIN SCALES - GENERAL: PAINLEVEL_OUTOF10: 9

## 2025-04-12 ASSESSMENT — PAIN - FUNCTIONAL ASSESSMENT: PAIN_FUNCTIONAL_ASSESSMENT: 0-10

## 2025-04-12 ASSESSMENT — PAIN DESCRIPTION - LOCATION: LOCATION: BACK;HIP

## 2025-04-13 LAB
ALBUMIN SERPL BCG-MCNC: 3.6 G/DL (ref 3.4–4.9)
ALP SERPL-CCNC: 97 U/L (ref 38–126)
ALT SERPL W/O P-5'-P-CCNC: 14 U/L (ref 10–35)
AST SERPL-CCNC: 16 U/L (ref 10–35)
BILIRUB CONJ SERPL-MCNC: < 0.1 MG/DL (ref 0–0.2)
BILIRUB SERPL-MCNC: < 0.2 MG/DL (ref 0.3–1.2)
BILIRUB UR QL STRIP.AUTO: NEGATIVE
CHARACTER UR: CLEAR
COLOR, UA: YELLOW
GLUCOSE UR QL STRIP.AUTO: NEGATIVE MG/DL
HGB UR QL STRIP.AUTO: NEGATIVE
HSV2 GG IGG SER-ACNC: 2.67 IV
KETONES UR QL STRIP.AUTO: ABNORMAL
NITRITE UR QL STRIP: NEGATIVE
PH UR STRIP.AUTO: 7 [PH] (ref 5–9)
PROT SERPL-MCNC: 6.7 G/DL (ref 6.4–8.3)
PROT UR STRIP.AUTO-MCNC: NEGATIVE MG/DL
SP GR UR REFRACT.AUTO: 1.02 (ref 1–1.03)
UROBILINOGEN, URINE: 0.2 EU/DL (ref 0–1)
WBC #/AREA URNS HPF: NEGATIVE /[HPF]

## 2025-04-13 PROCEDURE — 81003 URINALYSIS AUTO W/O SCOPE: CPT

## 2025-04-13 NOTE — DISCHARGE INSTRUCTIONS
You are seen in the ED for generalized pain.  Continue taking your antiviral as prescribed.  Alternate between Tylenol and ibuprofen for pain management.  Follow-up outpatient with your primary care provider as discussed.  If you notice worsening pain with inability to walk, inability to urinate or defecate, fever greater than 100.4, diffuse rash, do not hesitate to return to the ED and/or call 911.

## 2025-04-13 NOTE — ED PROVIDER NOTES
[facility]  EMERGENCY DEPARTMENT ENCOUNTER          Pt Name: Poncho Jasso  MRN: [id number]  Birthdate 1975  Date of evaluation: 4/12/2025  Treating Resident Physician: Homero Ferrell MD  Supervising Physician: Orestes Hollis DO    History obtained from the patient.     CHIEF COMPLAINT       Chief Complaint   Patient presents with    Back Pain       HISTORY OF PRESENT ILLNESS    Poncho Jasso is a 49 y.o. female recently diagnosed with HSV meningitis initially treated with IV acyclovir and subsequently transition to oral valacyclovir for a total of 10 days (with 2 days remaining as of today) who presents to the emergency department with a chief complaint of back pain with paresthesias rating to the bilateral lower extremities, neck pain and stiffness.  She reports the symptoms are pretty much present prior to diagnosis of meningitis.  She reports symptoms improved after she was discharged on Percocet, but ran out of Percocet on Wednesday.  Since then, she has been having worsening pain.  She is attempted Tylenol and ibuprofen without relief.  She reports being compliant with her medications.  She also complains of urinary retention and constipation, which she attributes to the opioids.  She has been taking some Dulcolax but has not been regular with her bowel movements.  She denies urinary discharge, dysuria, hematuria.  Today, she denies fever, petechial rash, nausea/vomiting, headache, visual changes, focal weakness, illicit or IV drug use.  Review of systems unremarkable except for aforementioned.      Triage notes and Nursing notes were reviewed by myself.  Any discrepancies are addressed above.    PAST MEDICAL HISTORY     Past Medical History:   Diagnosis Date    Arthritis of right shoulder region 04/02/2025    Asthma     seasonal    Blood circulation, collateral 1995    soccer injury    Calculus of gallbladder 04/02/2025    COVID-19 04/02/2025    Endometriosis

## 2025-04-13 NOTE — ED TRIAGE NOTES
Pt presents to the ER with c/o back and hip pain. Pt states she was dx with viral meningitis on 4/2 and d/c on 4/6. Pt states she was sent home with abx and percocet which she has been taking. Pt reports the pain has been getting worse over the last few days. Pt is alert and oriented, ambulatory to room. VSS

## 2025-04-14 ENCOUNTER — TELEPHONE (OUTPATIENT)
Dept: FAMILY MEDICINE CLINIC | Age: 50
End: 2025-04-14

## 2025-04-14 ENCOUNTER — OFFICE VISIT (OUTPATIENT)
Dept: FAMILY MEDICINE CLINIC | Age: 50
End: 2025-04-14
Payer: COMMERCIAL

## 2025-04-14 VITALS
SYSTOLIC BLOOD PRESSURE: 118 MMHG | HEART RATE: 72 BPM | OXYGEN SATURATION: 99 % | DIASTOLIC BLOOD PRESSURE: 72 MMHG | RESPIRATION RATE: 20 BRPM | BODY MASS INDEX: 43.4 KG/M2 | WEIGHT: 293 LBS | HEIGHT: 69 IN

## 2025-04-14 DIAGNOSIS — E55.9 VITAMIN D DEFICIENCY: ICD-10-CM

## 2025-04-14 DIAGNOSIS — B00.3 MENINGITIS DUE TO HERPES SIMPLEX VIRUS TYPE 2 (HSV-2): Primary | ICD-10-CM

## 2025-04-14 DIAGNOSIS — M54.50 ACUTE MIDLINE LOW BACK PAIN, UNSPECIFIED WHETHER SCIATICA PRESENT: ICD-10-CM

## 2025-04-14 DIAGNOSIS — D72.829 LEUKOCYTOSIS, UNSPECIFIED TYPE: ICD-10-CM

## 2025-04-14 DIAGNOSIS — E53.8 B12 DEFICIENCY: ICD-10-CM

## 2025-04-14 DIAGNOSIS — M54.2 NECK PAIN: ICD-10-CM

## 2025-04-14 PROCEDURE — 99214 OFFICE O/P EST MOD 30 MIN: CPT | Performed by: STUDENT IN AN ORGANIZED HEALTH CARE EDUCATION/TRAINING PROGRAM

## 2025-04-14 RX ORDER — OXYCODONE AND ACETAMINOPHEN 5; 325 MG/1; MG/1
1 TABLET ORAL EVERY 8 HOURS PRN
Qty: 12 TABLET | Refills: 0 | Status: SHIPPED | OUTPATIENT
Start: 2025-04-14 | End: 2025-04-18

## 2025-04-14 RX ORDER — VALACYCLOVIR HYDROCHLORIDE 1 G/1
1000 TABLET, FILM COATED ORAL 3 TIMES DAILY
Qty: 12 TABLET | Refills: 0 | Status: SHIPPED | OUTPATIENT
Start: 2025-04-14 | End: 2025-04-18

## 2025-04-14 ASSESSMENT — ENCOUNTER SYMPTOMS
SHORTNESS OF BREATH: 0
COUGH: 0
BACK PAIN: 1

## 2025-04-14 NOTE — TELEPHONE ENCOUNTER
----- Message from Dr. Elizabeth Drake, DO sent at 4/14/2025  1:10 PM EDT -----  Patient follows with Dr. Bradley (Neurology at [facility]) -- can we reach out to his office to see if patient can get an appointment scheduled with him soon since she was recently admitted to [facility] for meningitis and has ongoing pain and new numbness/tingling in her extremities. She tried contacting his office but has not heard back from them. Thanks    Dr. Drake

## 2025-04-14 NOTE — PROGRESS NOTES
[facility] PROFESSIONAL SERVS  [facility]  [address] [address]  Dept: [phone]  Dept Fax: [phone]  Loc: [phone]    Poncho Jasso is a 49 y.o. female who presents today for her medical conditions/complaints as noted below.     Chief Complaint   Patient presents with    ED Follow-up     [facility] on Saturday for pain in back/hip area and neck/head        HPI:     Patient presents to the office today for concerns of persistent pain.  Patient was diagnosed with HSV meningitis during hospital stay at [facility] 4/2 - 4/6.  She was treated with Valtrex (today is day 10/10 of treatment) and was prescribed 3 days of Percocet at discharge for back and neck pain related to this issue.  She states that she has taken her medication as prescribed.  Ran out of pain medication last Wednesday and was in severe pain at the end of last week and over the weekend.  Reports significant pain in the middle of her neck and along both sides, down the midline of her back and into her \"hips\", though this is more so located around her SI joints.  States that her pain also radiates down her legs, worse on the left side.  Has some numbness and tingling down the front of her left leg that is sensitive to touch.  Pain is worse when walking, going up or down stairs, or even when sitting for long periods of time.  States that it is hard to get into a comfortable position.  Pain was so bad over the weekend, she went to [facility] ED 2 days ago for this concern.  Patient did have lab workup that showed mildly elevated WBC at 13.2, but otherwise did not show any signs of infection.  UA negative.  She was given 1 dose of Norco while in the ED, but discharged to home with instructions to follow-up with PCP.  States that her pain is just slightly improved today, but is still persistent.  She has been trying to take ibuprofen, Epsom salt baths, and use a heating pad, but this does not provide significant

## 2025-04-15 ENCOUNTER — RESULTS FOLLOW-UP (OUTPATIENT)
Dept: FAMILY MEDICINE CLINIC | Age: 50
End: 2025-04-15

## 2025-04-15 DIAGNOSIS — E53.8 B12 DEFICIENCY: Primary | ICD-10-CM

## 2025-04-17 LAB — B. BURGDORFERI VLSE1/PEPC10 ABS, CSF: NORMAL

## 2025-04-21 ENCOUNTER — LAB (OUTPATIENT)
Dept: FAMILY MEDICINE CLINIC | Age: 50
End: 2025-04-21
Payer: COMMERCIAL

## 2025-04-21 DIAGNOSIS — B00.3 MENINGITIS DUE TO HERPES SIMPLEX VIRUS TYPE 2 (HSV-2): ICD-10-CM

## 2025-04-21 DIAGNOSIS — E55.9 VITAMIN D DEFICIENCY: ICD-10-CM

## 2025-04-21 DIAGNOSIS — E83.42 HYPOMAGNESEMIA: ICD-10-CM

## 2025-04-21 DIAGNOSIS — D72.829 LEUKOCYTOSIS, UNSPECIFIED TYPE: ICD-10-CM

## 2025-04-21 DIAGNOSIS — E53.8 B12 DEFICIENCY: ICD-10-CM

## 2025-04-21 LAB
25(OH)D3 SERPL-MCNC: 41 NG/ML (ref 30–100)
ALBUMIN SERPL BCG-MCNC: 4 G/DL (ref 3.4–4.9)
ALP SERPL-CCNC: 104 U/L (ref 38–126)
ALT SERPL W/O P-5'-P-CCNC: 16 U/L (ref 10–35)
ANION GAP SERPL CALC-SCNC: 13 MEQ/L (ref 8–16)
AST SERPL-CCNC: 13 U/L (ref 10–35)
BASOPHILS ABSOLUTE: 0.1 THOU/MM3 (ref 0–0.1)
BASOPHILS NFR BLD AUTO: 0.4 %
BILIRUB SERPL-MCNC: < 0.2 MG/DL (ref 0.3–1.2)
BUN SERPL-MCNC: 14 MG/DL (ref 8–23)
CALCIUM SERPL-MCNC: 8.8 MG/DL (ref 8.6–10)
CHLORIDE SERPL-SCNC: 105 MEQ/L (ref 98–111)
CO2 SERPL-SCNC: 20 MEQ/L (ref 22–29)
CREAT SERPL-MCNC: 0.6 MG/DL (ref 0.5–0.9)
DEPRECATED RDW RBC AUTO: 49.4 FL (ref 35–45)
EOSINOPHIL NFR BLD AUTO: 3.3 %
EOSINOPHILS ABSOLUTE: 0.4 THOU/MM3 (ref 0–0.4)
ERYTHROCYTE [DISTWIDTH] IN BLOOD BY AUTOMATED COUNT: 14.6 % (ref 11.5–14.5)
GFR SERPL CREATININE-BSD FRML MDRD: > 90 ML/MIN/1.73M2
GLUCOSE SERPL-MCNC: 96 MG/DL (ref 74–109)
HCT VFR BLD AUTO: 42 % (ref 37–47)
HGB BLD-MCNC: 13.3 GM/DL (ref 12–16)
IMM GRANULOCYTES # BLD AUTO: 0.04 THOU/MM3 (ref 0–0.07)
IMM GRANULOCYTES NFR BLD AUTO: 0.3 %
LYMPHOCYTES ABSOLUTE: 4 THOU/MM3 (ref 1–4.8)
LYMPHOCYTES NFR BLD AUTO: 30.9 %
MAGNESIUM SERPL-MCNC: 2.1 MG/DL (ref 1.6–2.6)
MCH RBC QN AUTO: 29.4 PG (ref 26–33)
MCHC RBC AUTO-ENTMCNC: 31.7 GM/DL (ref 32.2–35.5)
MCV RBC AUTO: 92.7 FL (ref 81–99)
MONOCYTES ABSOLUTE: 0.7 THOU/MM3 (ref 0.4–1.3)
MONOCYTES NFR BLD AUTO: 5.3 %
NEUTROPHILS ABSOLUTE: 7.8 THOU/MM3 (ref 1.8–7.7)
NEUTROPHILS NFR BLD AUTO: 59.8 %
NRBC BLD AUTO-RTO: 0 /100 WBC
PLATELET # BLD AUTO: 498 THOU/MM3 (ref 130–400)
PMV BLD AUTO: 9.2 FL (ref 9.4–12.4)
POTASSIUM SERPL-SCNC: 4.1 MEQ/L (ref 3.5–5.2)
PROT SERPL-MCNC: 7.3 G/DL (ref 6.4–8.3)
RBC # BLD AUTO: 4.53 MILL/MM3 (ref 4.2–5.4)
SODIUM SERPL-SCNC: 138 MEQ/L (ref 135–145)
VIT B12 SERPL-MCNC: 269 PG/ML (ref 232–1245)
WBC # BLD AUTO: 13.1 THOU/MM3 (ref 4.8–10.8)

## 2025-04-21 PROCEDURE — 36415 COLL VENOUS BLD VENIPUNCTURE: CPT | Performed by: STUDENT IN AN ORGANIZED HEALTH CARE EDUCATION/TRAINING PROGRAM

## 2025-04-22 RX ORDER — CYANOCOBALAMIN 1000 UG/ML
1000 INJECTION, SOLUTION INTRAMUSCULAR; SUBCUTANEOUS
Qty: 4 ML | Refills: 0 | Status: SHIPPED | OUTPATIENT
Start: 2025-04-22 | End: 2025-05-14

## 2025-06-17 ENCOUNTER — OFFICE VISIT (OUTPATIENT)
Dept: FAMILY MEDICINE CLINIC | Age: 50
End: 2025-06-17
Payer: COMMERCIAL

## 2025-06-17 ENCOUNTER — RESULTS FOLLOW-UP (OUTPATIENT)
Dept: FAMILY MEDICINE CLINIC | Age: 50
End: 2025-06-17

## 2025-06-17 VITALS
SYSTOLIC BLOOD PRESSURE: 120 MMHG | OXYGEN SATURATION: 97 % | BODY MASS INDEX: 43.4 KG/M2 | WEIGHT: 293 LBS | HEART RATE: 90 BPM | HEIGHT: 69 IN | TEMPERATURE: 98.2 F | DIASTOLIC BLOOD PRESSURE: 80 MMHG | RESPIRATION RATE: 16 BRPM

## 2025-06-17 DIAGNOSIS — R19.7 DIARRHEA OF PRESUMED INFECTIOUS ORIGIN: Primary | ICD-10-CM

## 2025-06-17 DIAGNOSIS — D75.839 THROMBOCYTOSIS: Primary | ICD-10-CM

## 2025-06-17 DIAGNOSIS — K52.9 ACUTE GASTROENTERITIS: ICD-10-CM

## 2025-06-17 DIAGNOSIS — D72.829 LEUKOCYTOSIS, UNSPECIFIED TYPE: ICD-10-CM

## 2025-06-17 DIAGNOSIS — E53.8 B12 DEFICIENCY: ICD-10-CM

## 2025-06-17 LAB
ANION GAP SERPL CALC-SCNC: 14 MEQ/L (ref 8–16)
BASOPHILS ABSOLUTE: 0 THOU/MM3 (ref 0–0.1)
BASOPHILS NFR BLD AUTO: 0.2 %
BUN SERPL-MCNC: 12 MG/DL (ref 8–23)
CALCIUM SERPL-MCNC: 8.9 MG/DL (ref 8.6–10)
CHLORIDE SERPL-SCNC: 104 MEQ/L (ref 98–111)
CO2 SERPL-SCNC: 21 MEQ/L (ref 22–29)
CREAT SERPL-MCNC: 0.7 MG/DL (ref 0.5–0.9)
DEPRECATED RDW RBC AUTO: 49.1 FL (ref 35–45)
EOSINOPHIL NFR BLD AUTO: 2.5 %
EOSINOPHILS ABSOLUTE: 0.3 THOU/MM3 (ref 0–0.4)
ERYTHROCYTE [DISTWIDTH] IN BLOOD BY AUTOMATED COUNT: 14 % (ref 11.5–14.5)
GFR SERPL CREATININE-BSD FRML MDRD: > 90 ML/MIN/1.73M2
GLUCOSE SERPL-MCNC: 114 MG/DL (ref 74–109)
HCT VFR BLD AUTO: 42.9 % (ref 37–47)
HGB BLD-MCNC: 13.5 GM/DL (ref 12–16)
IMM GRANULOCYTES # BLD AUTO: 0.02 THOU/MM3 (ref 0–0.07)
IMM GRANULOCYTES NFR BLD AUTO: 0.2 %
LYMPHOCYTES ABSOLUTE: 3.3 THOU/MM3 (ref 1–4.8)
LYMPHOCYTES NFR BLD AUTO: 28.5 %
MAGNESIUM SERPL-MCNC: 1.6 MG/DL (ref 1.6–2.6)
MCH RBC QN AUTO: 30.1 PG (ref 26–33)
MCHC RBC AUTO-ENTMCNC: 31.5 GM/DL (ref 32.2–35.5)
MCV RBC AUTO: 95.8 FL (ref 81–99)
MONOCYTES ABSOLUTE: 0.6 THOU/MM3 (ref 0.4–1.3)
MONOCYTES NFR BLD AUTO: 5.3 %
NEUTROPHILS ABSOLUTE: 7.3 THOU/MM3 (ref 1.8–7.7)
NEUTROPHILS NFR BLD AUTO: 63.3 %
NRBC BLD AUTO-RTO: 0 /100 WBC
PLATELET # BLD AUTO: 452 THOU/MM3 (ref 130–400)
PMV BLD AUTO: 9.2 FL (ref 9.4–12.4)
POTASSIUM SERPL-SCNC: 3.7 MEQ/L (ref 3.5–5.2)
RBC # BLD AUTO: 4.48 MILL/MM3 (ref 4.2–5.4)
SODIUM SERPL-SCNC: 139 MEQ/L (ref 135–145)
WBC # BLD AUTO: 11.5 THOU/MM3 (ref 4.8–10.8)

## 2025-06-17 PROCEDURE — 99214 OFFICE O/P EST MOD 30 MIN: CPT | Performed by: STUDENT IN AN ORGANIZED HEALTH CARE EDUCATION/TRAINING PROGRAM

## 2025-06-17 PROCEDURE — 36415 COLL VENOUS BLD VENIPUNCTURE: CPT | Performed by: STUDENT IN AN ORGANIZED HEALTH CARE EDUCATION/TRAINING PROGRAM

## 2025-06-17 PROCEDURE — 96372 THER/PROPH/DIAG INJ SC/IM: CPT | Performed by: STUDENT IN AN ORGANIZED HEALTH CARE EDUCATION/TRAINING PROGRAM

## 2025-06-17 RX ORDER — CYANOCOBALAMIN 1000 UG/ML
1000 INJECTION, SOLUTION INTRAMUSCULAR; SUBCUTANEOUS ONCE
Status: COMPLETED | OUTPATIENT
Start: 2025-06-17 | End: 2025-06-17

## 2025-06-17 RX ORDER — HYDROCODONE BITARTRATE AND ACETAMINOPHEN 5; 325 MG/1; MG/1
TABLET ORAL
COMMUNITY
Start: 2025-05-10

## 2025-06-17 RX ADMIN — CYANOCOBALAMIN 1000 MCG: 1000 INJECTION, SOLUTION INTRAMUSCULAR; SUBCUTANEOUS at 11:38

## 2025-06-17 ASSESSMENT — ENCOUNTER SYMPTOMS
VOMITING: 0
ABDOMINAL PAIN: 1
SHORTNESS OF BREATH: 0
DIARRHEA: 1
ANAL BLEEDING: 1
NAUSEA: 1
CONSTIPATION: 0
BLOOD IN STOOL: 0
COUGH: 0

## 2025-06-17 NOTE — PROGRESS NOTES
[facility] PROFESSIONAL SERVS  [facility]  [address] [address]  Dept: [phone]  Dept Fax: [phone]  Loc: [phone]    Poncho Jasso is a 49 y.o. female who presents today for her medical conditions/complaints as noted below.     Chief Complaint   Patient presents with    Diarrhea     Abdominal pain sx started on Friday- got better over the weekend but sx returned      HPI:     Patient presents to the office today for concerns of diarrhea.  States that she developed abdominal cramping and severe diarrhea 4 days ago.  Diarrhea is described as profuse, watery, nonbloody diarrhea, though patient did have some bright red blood on her toilet paper after wiping this morning.  States that she has also had significant nausea, but denies any vomiting.  Symptoms did improve somewhat over the weekend (though still had some diarrhea), but worsened again this morning.  Patient states that she has already had 4 episodes of watery diarrhea this morning.  Appetite has been low; has only eaten some yogurt this weekend.  She denies any abnormal food ingestion.  Denies known sick contacts.  She does have Zofran and Phenergan at home to take as needed for nausea if needed.  She has taken OTC upset stomach medication for her symptoms.  She does take magnesium 400 mg daily.  She is worried because she has had severely low magnesium earlier this year when she was admitted for meningitis.  Would like to have labs rechecked today.      Past Medical History:   Diagnosis Date    Arthritis of right shoulder region 04/02/2025    Asthma     seasonal    Blood circulation, collateral 1995    soccer injury    Calculus of gallbladder 04/02/2025    COVID-19 04/02/2025    Endometriosis 04/02/2025    Ana Maria Fry infection     Fracture of femur (HCC) 04/02/2025    Gastro-oesophageal reflux disease with hiatal hernia     Headache     Irritable bowel syndrome     Myalgia 04/02/2025

## 2025-06-17 NOTE — PROGRESS NOTES
Medication(s) given during visit:    Administrations This Visit       cyanocobalamin injection 1,000 mcg       Admin Date  06/17/2025  11:38 Action  Given Dose  1,000 mcg Route  IntraMUSCular Site  Ventrogluteal Right Documented By  Suha Bean MA    NDC: 95487-853-93    Lot#: [id number]    : Leveler    Patient Supplied?: Yes                    Patient instructed to remain in clinic for 20 minutes after injection and was advised to report any adverse reaction to me immediately.

## 2025-06-24 ENCOUNTER — LAB (OUTPATIENT)
Dept: FAMILY MEDICINE CLINIC | Age: 50
End: 2025-06-24

## 2025-06-24 LAB
ALBUMIN SERPL BCG-MCNC: 3.6 G/DL (ref 3.4–4.9)
ANION GAP SERPL CALC-SCNC: 16 MEQ/L (ref 8–16)
BUN SERPL-MCNC: 10 MG/DL (ref 8–23)
CALCIUM SERPL-MCNC: 9 MG/DL (ref 8.6–10)
CHLORIDE SERPL-SCNC: 109 MEQ/L (ref 98–111)
CO2 SERPL-SCNC: 18 MEQ/L (ref 22–29)
CREAT SERPL-MCNC: 0.8 MG/DL (ref 0.5–0.9)
GFR SERPL CREATININE-BSD FRML MDRD: 90 ML/MIN/1.73M2
GLUCOSE SERPL-MCNC: 90 MG/DL (ref 74–109)
MAGNESIUM SERPL-MCNC: 2 MG/DL (ref 1.6–2.6)
PHOSPHATE SERPL-MCNC: 2.8 MG/DL (ref 2.5–4.5)
POTASSIUM SERPL-SCNC: 4 MEQ/L (ref 3.5–5.2)
SODIUM SERPL-SCNC: 143 MEQ/L (ref 135–145)

## 2025-06-26 ENCOUNTER — RESULTS FOLLOW-UP (OUTPATIENT)
Dept: FAMILY MEDICINE CLINIC | Age: 50
End: 2025-06-26

## 2025-07-10 LAB
ALBUMIN: 3.8 G/DL
ALP BLD-CCNC: 106 U/L
ALT SERPL-CCNC: 15 U/L
ANION GAP SERPL CALCULATED.3IONS-SCNC: 8 MMOL/L
AST SERPL-CCNC: 10 U/L
BASOPHILS ABSOLUTE: NORMAL
BASOPHILS RELATIVE PERCENT: NORMAL
BILIRUB SERPL-MCNC: 0.2 MG/DL (ref 0.1–1.4)
BUN BLDV-MCNC: 13 MG/DL
CALCIUM SERPL-MCNC: 9.1 MG/DL
CHLORIDE BLD-SCNC: 105 MMOL/L
CO2: 24 MMOL/L
CREAT SERPL-MCNC: 0.69 MG/DL
EOSINOPHILS ABSOLUTE: NORMAL
EOSINOPHILS RELATIVE PERCENT: NORMAL
GFR, ESTIMATED: >60
GLUCOSE BLD-MCNC: 94 MG/DL
HCT VFR BLD CALC: 38.7 % (ref 36–46)
HEMOGLOBIN: 12.9 G/DL (ref 12–16)
LYMPHOCYTES ABSOLUTE: NORMAL
LYMPHOCYTES RELATIVE PERCENT: NORMAL
MAGNESIUM: 1.7 MG/DL
MCH RBC QN AUTO: 29.9 PG
MCHC RBC AUTO-ENTMCNC: 33.2 G/DL
MCV RBC AUTO: 90 FL
MONOCYTES ABSOLUTE: NORMAL
MONOCYTES RELATIVE PERCENT: NORMAL
NEUTROPHILS ABSOLUTE: NORMAL
NEUTROPHILS RELATIVE PERCENT: NORMAL
PDW BLD-RTO: 14.1 %
PLATELET # BLD: 472 K/ΜL
PMV BLD AUTO: 7.4 FL
POTASSIUM SERPL-SCNC: 4 MMOL/L
PTH INTACT: 11.9
RBC # BLD: 4.3 10^6/ΜL
SODIUM BLD-SCNC: 137 MMOL/L
TOTAL PROTEIN: 7.3 G/DL (ref 6.4–8.2)
VITAMIN D 25-HYDROXY: 32
VITAMIN D2, 25 HYDROXY: NORMAL
VITAMIN D3,25 HYDROXY: NORMAL
WBC # BLD: 11.5 10^3/ML

## 2025-07-21 LAB — MAGNESIUM: 1.4 MG/DL (ref 1.8–2.5)

## 2025-07-28 ENCOUNTER — OFFICE VISIT (OUTPATIENT)
Dept: FAMILY MEDICINE CLINIC | Age: 50
End: 2025-07-28
Payer: COMMERCIAL

## 2025-07-28 ENCOUNTER — TELEPHONE (OUTPATIENT)
Dept: FAMILY MEDICINE CLINIC | Age: 50
End: 2025-07-28

## 2025-07-28 VITALS
RESPIRATION RATE: 16 BRPM | WEIGHT: 293 LBS | DIASTOLIC BLOOD PRESSURE: 78 MMHG | OXYGEN SATURATION: 98 % | BODY MASS INDEX: 43.4 KG/M2 | SYSTOLIC BLOOD PRESSURE: 126 MMHG | HEART RATE: 84 BPM | HEIGHT: 69 IN

## 2025-07-28 DIAGNOSIS — R79.0 LOW MAGNESIUM LEVELS: Primary | ICD-10-CM

## 2025-07-28 DIAGNOSIS — K21.9 GASTROESOPHAGEAL REFLUX DISEASE, UNSPECIFIED WHETHER ESOPHAGITIS PRESENT: ICD-10-CM

## 2025-07-28 DIAGNOSIS — F41.9 ANXIETY: ICD-10-CM

## 2025-07-28 LAB — MAGNESIUM: 1.6 MG/DL (ref 1.8–2.5)

## 2025-07-28 PROCEDURE — 99214 OFFICE O/P EST MOD 30 MIN: CPT | Performed by: STUDENT IN AN ORGANIZED HEALTH CARE EDUCATION/TRAINING PROGRAM

## 2025-07-28 RX ORDER — FAMOTIDINE 40 MG/1
40 TABLET, FILM COATED ORAL EVERY EVENING
Qty: 30 TABLET | Refills: 1 | Status: SHIPPED | OUTPATIENT
Start: 2025-07-28

## 2025-07-28 RX ORDER — SUCRALFATE 1 G/10ML
1 SUSPENSION ORAL
COMMUNITY
Start: 2025-07-03

## 2025-07-28 RX ORDER — CITALOPRAM HYDROBROMIDE 40 MG/1
40 TABLET ORAL DAILY
Qty: 30 TABLET | Refills: 2 | Status: SHIPPED | OUTPATIENT
Start: 2025-07-28

## 2025-07-28 RX ORDER — CITALOPRAM HYDROBROMIDE 20 MG/1
20 TABLET ORAL DAILY
Qty: 90 TABLET | Refills: 1 | Status: CANCELLED | OUTPATIENT
Start: 2025-07-28

## 2025-07-28 RX ORDER — ESCITALOPRAM OXALATE 20 MG/1
20 TABLET ORAL
COMMUNITY
Start: 2025-06-21 | End: 2025-07-28 | Stop reason: CLARIF

## 2025-07-28 ASSESSMENT — ENCOUNTER SYMPTOMS
SHORTNESS OF BREATH: 0
DIARRHEA: 0
NAUSEA: 0
COUGH: 0
VOMITING: 0
ABDOMINAL PAIN: 0

## 2025-07-28 NOTE — TELEPHONE ENCOUNTER
Spoke with pt regarding magnesium infusions and notified her that office no longer does PA, pt can either go to [facility] outpatient nursing for infusions or be referred to [facility] nephrology and they can order infusions. Pt states she would like to proceed with referral.      Pt states she would also like to switch from omeprazole to another medication for GERD.

## 2025-07-28 NOTE — TELEPHONE ENCOUNTER
Referral placed to nephrology at [facility]. Pepcid sent to patient's pharmacy to trial. Please have her keep me updated. Thanks    Dr. Drake

## 2025-07-28 NOTE — PROGRESS NOTES
[facility] [place] [facility]  [facility]  [address] [address]  Dept: [phone]  Dept Fax: [phone]  Loc: [phone]    Poncho Jasso is a 49 y.o. female who presents today for her medical conditions/complaints as noted below.     Chief Complaint   Patient presents with    ED Follow-up     Low magnesium [facility]     Medication Refill     Citalopram        HPI:     Patient presents to the office today to discuss low magnesium levels.  Patient has been hospitalized twice since 5/25 for hypomagnesemia.  She has also had numerous ER visits at [facility] for low magnesium and has required IV replacement on almost every occasion --typically has symptoms of low magnesium, including shakiness and overall weakness feeling.  Patient was last at [facility] ED on 7/25 and received 2 g IV infusion of magnesium at that time.  She has been seeing a nephrologist at [facility] who reported that she has no issues with her kidneys as far as reabsorption of magnesium.  Nephrology did set her up for magnesium infusions at [facility]; initially, this occurred once weekly, but patient was able to get this changed to twice weekly since she was running low on magnesium in between infusions.  States that her infusions are now only lasting about 2 days before her magnesium runs low again.  She is taking 3000 mg of magnesium daily.  She continues taking omeprazole and Carafate; adamant that she does not want to stop omeprazole due to significant GI upset without this medication.  She was referred to GI by her nephrologist; scheduled for EGD and colonoscopy on 8/15 with Dr. Ferrell.  Nephrology thinks that her low magnesium is due to a GI absorption issue.  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    Patient is interested in having me order magnesium infusions at [facility] since this infusion center has better hours.  She has also not had a great experience with the infusion center at

## 2025-07-29 NOTE — TELEPHONE ENCOUNTER
Pt called office back, voiced to cancel referral, said she took care of it her self.     Referral cancelled

## 2025-08-04 ENCOUNTER — HOSPITAL ENCOUNTER (OUTPATIENT)
Dept: INFUSION THERAPY | Age: 50
Discharge: HOME | End: 2025-08-04
Payer: COMMERCIAL

## 2025-08-04 ENCOUNTER — OFFICE VISIT (OUTPATIENT)
Dept: ONCOLOGY | Age: 50
End: 2025-08-04
Payer: COMMERCIAL

## 2025-08-04 ENCOUNTER — APPOINTMENT (OUTPATIENT)
Dept: CT IMAGING | Age: 50
End: 2025-08-04
Payer: COMMERCIAL

## 2025-08-04 ENCOUNTER — HOSPITAL ENCOUNTER (EMERGENCY)
Age: 50
Discharge: HOME | End: 2025-08-04
Attending: STUDENT IN AN ORGANIZED HEALTH CARE EDUCATION/TRAINING PROGRAM
Payer: COMMERCIAL

## 2025-08-04 ENCOUNTER — APPOINTMENT (OUTPATIENT)
Dept: INTERVENTIONAL RADIOLOGY/VASCULAR | Age: 50
End: 2025-08-04
Payer: COMMERCIAL

## 2025-08-04 VITALS
HEART RATE: 66 BPM | TEMPERATURE: 98.2 F | OXYGEN SATURATION: 100 % | SYSTOLIC BLOOD PRESSURE: 109 MMHG | DIASTOLIC BLOOD PRESSURE: 61 MMHG | RESPIRATION RATE: 13 BRPM

## 2025-08-04 VITALS
DIASTOLIC BLOOD PRESSURE: 65 MMHG | WEIGHT: 293 LBS | BODY MASS INDEX: 43.4 KG/M2 | HEART RATE: 82 BPM | TEMPERATURE: 98.5 F | SYSTOLIC BLOOD PRESSURE: 140 MMHG | OXYGEN SATURATION: 100 % | HEIGHT: 69 IN | RESPIRATION RATE: 20 BRPM

## 2025-08-04 VITALS
HEART RATE: 78 BPM | TEMPERATURE: 98.2 F | DIASTOLIC BLOOD PRESSURE: 65 MMHG | SYSTOLIC BLOOD PRESSURE: 140 MMHG | RESPIRATION RATE: 18 BRPM

## 2025-08-04 DIAGNOSIS — R51.9 NONINTRACTABLE HEADACHE, UNSPECIFIED CHRONICITY PATTERN, UNSPECIFIED HEADACHE TYPE: Primary | ICD-10-CM

## 2025-08-04 DIAGNOSIS — D72.829 LEUKOCYTOSIS, UNSPECIFIED TYPE: Primary | ICD-10-CM

## 2025-08-04 DIAGNOSIS — D75.839 THROMBOCYTOSIS: ICD-10-CM

## 2025-08-04 DIAGNOSIS — Z01.89 ENCOUNTER FOR LUMBAR PUNCTURE: ICD-10-CM

## 2025-08-04 LAB
ALBUMIN SERPL BCG-MCNC: 3.6 G/DL (ref 3.4–4.9)
ALP SERPL-CCNC: 109 U/L (ref 38–126)
ALT SERPL W/O P-5'-P-CCNC: 11 U/L (ref 10–35)
ANION GAP SERPL CALC-SCNC: 8 MEQ/L (ref 8–16)
AST SERPL-CCNC: 14 U/L (ref 10–35)
BACTERIA URNS QL MICRO: ABNORMAL /HPF
BASOPHILS ABSOLUTE: 0 THOU/MM3 (ref 0–0.1)
BASOPHILS NFR BLD AUTO: 0.3 %
BILIRUB CONJ SERPL-MCNC: < 0.1 MG/DL (ref 0–0.2)
BILIRUB SERPL-MCNC: < 0.2 MG/DL (ref 0.3–1.2)
BILIRUB UR QL STRIP.AUTO: NEGATIVE
BUN SERPL-MCNC: 13 MG/DL (ref 8–23)
CALCIUM SERPL-MCNC: 8.7 MG/DL (ref 8.6–10)
CASTS #/AREA URNS LPF: ABNORMAL /LPF
CASTS 2: ABNORMAL /LPF
CHARACTER UR: ABNORMAL
CHLORIDE SERPL-SCNC: 107 MEQ/L (ref 98–111)
CMV DNA CSF QL NAA+PROBE: NOT DETECTED
CO2 SERPL-SCNC: 23 MEQ/L (ref 22–29)
COLOR, UA: YELLOW
CREAT SERPL-MCNC: 0.7 MG/DL (ref 0.5–0.9)
CRYPTOC AG CSF QL: NOT DETECTED
CRYSTALS URNS MICRO: ABNORMAL
DEPRECATED RDW RBC AUTO: 45 FL (ref 35–45)
E COLI K1 AG CSF QL: NOT DETECTED
EOSINOPHIL NFR BLD AUTO: 5.2 %
EOSINOPHILS ABSOLUTE: 0.6 THOU/MM3 (ref 0–0.4)
EPITHELIAL CELLS, UA: ABNORMAL /HPF
ERYTHROCYTE [DISTWIDTH] IN BLOOD BY AUTOMATED COUNT: 13.1 % (ref 11.5–14.5)
EV RNA CSF QL NAA+PROBE: NOT DETECTED
GFR SERPL CREATININE-BSD FRML MDRD: > 90 ML/MIN/1.73M2
GLUCOSE CSF-MCNC: 57 MG/DL (ref 40–80)
GLUCOSE SERPL-MCNC: 99 MG/DL (ref 74–109)
GLUCOSE UR QL STRIP.AUTO: NEGATIVE MG/DL
GP B STREP DNA SPEC QL NAA+PROBE: NOT DETECTED
HAEM INFLU DNA SPEC QL NAA+PROBE: NOT DETECTED
HCT VFR BLD AUTO: 38.7 % (ref 37–47)
HGB BLD-MCNC: 12.2 GM/DL (ref 12–16)
HGB UR QL STRIP.AUTO: NEGATIVE
HHV6 DNA CSF QL NAA+PROBE: NOT DETECTED
HSV1 DNA CSF QL NAA+PROBE: NOT DETECTED
HSV2 DNA CSF QL NAA+PROBE: NOT DETECTED
IMM GRANULOCYTES # BLD AUTO: 0.04 THOU/MM3 (ref 0–0.07)
IMM GRANULOCYTES NFR BLD AUTO: 0.4 %
KETONES UR QL STRIP.AUTO: ABNORMAL
L MONOCYTOG DNA SPEC QL NAA+PROBE: NOT DETECTED
LACTIC ACID, SEPSIS: 0.6 MMOL/L (ref 0.5–1.9)
LACTIC ACID, SEPSIS: 1.1 MMOL/L (ref 0.5–1.9)
LYMPHOCYTES ABSOLUTE: 3.8 THOU/MM3 (ref 1–4.8)
LYMPHOCYTES NFR BLD AUTO: 33.5 %
MAGNESIUM SERPL-MCNC: 2 MG/DL (ref 1.6–2.6)
MCH RBC QN AUTO: 29.8 PG (ref 26–33)
MCHC RBC AUTO-ENTMCNC: 31.5 GM/DL (ref 32.2–35.5)
MCV RBC AUTO: 94.6 FL (ref 81–99)
MISCELLANEOUS 2: ABNORMAL
MONOCYTES ABSOLUTE: 0.6 THOU/MM3 (ref 0.4–1.3)
MONOCYTES NFR BLD AUTO: 5.7 %
N MEN DNA SPEC QL NAA+PROBE: NOT DETECTED
NEUTROPHILS ABSOLUTE: 6.1 THOU/MM3 (ref 1.8–7.7)
NEUTROPHILS NFR BLD AUTO: 54.9 %
NITRITE UR QL STRIP: NEGATIVE
NRBC BLD AUTO-RTO: 0 /100 WBC
NT-PROBNP SERPL IA-MCNC: 93 PG/ML (ref 0–124)
OSMOLALITY SERPL CALC.SUM OF ELEC: 275.8 MOSMOL/KG (ref 275–300)
PARECHOVIRUS A RNA SPEC QL NAA+PROBE: NOT DETECTED
PH UR STRIP.AUTO: 6 [PH] (ref 5–9)
PLATELET # BLD AUTO: 429 THOU/MM3 (ref 130–400)
PMV BLD AUTO: 9.1 FL (ref 9.4–12.4)
POTASSIUM SERPL-SCNC: 3.8 MEQ/L (ref 3.5–5.2)
PROT CSF-MCNC: 51 MG/DL (ref 15–45)
PROT SERPL-MCNC: 6.7 G/DL (ref 6.4–8.3)
PROT UR STRIP.AUTO-MCNC: NEGATIVE MG/DL
RBC # BLD AUTO: 4.09 MILL/MM3 (ref 4.2–5.4)
RBC URINE: ABNORMAL /HPF
RENAL EPI CELLS #/AREA URNS HPF: ABNORMAL /[HPF]
S PNEUM DNA SPEC QL NAA+PROBE: NOT DETECTED
SODIUM SERPL-SCNC: 138 MEQ/L (ref 135–145)
SP GR UR REFRACT.AUTO: 1.02 (ref 1–1.03)
SPECIMEN SOURCE: NORMAL
TROPONIN, HIGH SENSITIVITY: < 6 NG/L (ref 0–12)
UROBILINOGEN, URINE: 0.2 EU/DL (ref 0–1)
VZV DNA CSF QL NAA+PROBE: NOT DETECTED
WBC # BLD AUTO: 11.2 THOU/MM3 (ref 4.8–10.8)
WBC #/AREA URNS HPF: ABNORMAL /HPF
WBC #/AREA URNS HPF: NEGATIVE /[HPF]
YEAST LIKE FUNGI URNS QL MICRO: ABNORMAL

## 2025-08-04 PROCEDURE — 88108 CYTOPATH CONCENTRATE TECH: CPT

## 2025-08-04 PROCEDURE — 81001 URINALYSIS AUTO W/SCOPE: CPT

## 2025-08-04 PROCEDURE — 72125 CT NECK SPINE W/O DYE: CPT

## 2025-08-04 PROCEDURE — 87075 CULTR BACTERIA EXCEPT BLOOD: CPT

## 2025-08-04 PROCEDURE — 83735 ASSAY OF MAGNESIUM: CPT

## 2025-08-04 PROCEDURE — 88112 CYTOPATH CELL ENHANCE TECH: CPT

## 2025-08-04 PROCEDURE — 87529 HSV DNA AMP PROBE: CPT

## 2025-08-04 PROCEDURE — 36415 COLL VENOUS BLD VENIPUNCTURE: CPT

## 2025-08-04 PROCEDURE — 87798 DETECT AGENT NOS DNA AMP: CPT

## 2025-08-04 PROCEDURE — 99211 OFF/OP EST MAY X REQ PHY/QHP: CPT

## 2025-08-04 PROCEDURE — 2709999900 IR LUMBAR PUNCTURE FOR DIAGNOSIS

## 2025-08-04 PROCEDURE — 82945 GLUCOSE OTHER FLUID: CPT

## 2025-08-04 PROCEDURE — 70450 CT HEAD/BRAIN W/O DYE: CPT

## 2025-08-04 PROCEDURE — 62328 DX LMBR SPI PNXR W/FLUOR/CT: CPT

## 2025-08-04 PROCEDURE — 82248 BILIRUBIN DIRECT: CPT

## 2025-08-04 PROCEDURE — 6360000002 HC RX W HCPCS

## 2025-08-04 PROCEDURE — 85025 COMPLETE CBC W/AUTO DIFF WBC: CPT

## 2025-08-04 PROCEDURE — 87040 BLOOD CULTURE FOR BACTERIA: CPT

## 2025-08-04 PROCEDURE — 84157 ASSAY OF PROTEIN OTHER: CPT

## 2025-08-04 PROCEDURE — 93005 ELECTROCARDIOGRAM TRACING: CPT

## 2025-08-04 PROCEDURE — 2580000003 HC RX 258

## 2025-08-04 PROCEDURE — 87205 SMEAR GRAM STAIN: CPT

## 2025-08-04 PROCEDURE — 84484 ASSAY OF TROPONIN QUANT: CPT

## 2025-08-04 PROCEDURE — 99203 OFFICE O/P NEW LOW 30 MIN: CPT | Performed by: NURSE PRACTITIONER

## 2025-08-04 PROCEDURE — 83605 ASSAY OF LACTIC ACID: CPT

## 2025-08-04 PROCEDURE — 80053 COMPREHEN METABOLIC PANEL: CPT

## 2025-08-04 PROCEDURE — 83880 ASSAY OF NATRIURETIC PEPTIDE: CPT

## 2025-08-04 RX ORDER — PROCHLORPERAZINE EDISYLATE 5 MG/ML
10 INJECTION INTRAMUSCULAR; INTRAVENOUS ONCE
Status: COMPLETED | OUTPATIENT
Start: 2025-08-04 | End: 2025-08-04

## 2025-08-04 RX ORDER — SODIUM CHLORIDE 0.9 % (FLUSH) 0.9 %
5-40 SYRINGE (ML) INJECTION EVERY 12 HOURS SCHEDULED
Status: DISCONTINUED | OUTPATIENT
Start: 2025-08-04 | End: 2025-08-05 | Stop reason: HOSPADM

## 2025-08-04 RX ORDER — SODIUM CHLORIDE 0.9 % (FLUSH) 0.9 %
5-40 SYRINGE (ML) INJECTION PRN
Status: DISCONTINUED | OUTPATIENT
Start: 2025-08-04 | End: 2025-08-05 | Stop reason: HOSPADM

## 2025-08-04 RX ORDER — SODIUM CHLORIDE 9 MG/ML
INJECTION, SOLUTION INTRAVENOUS PRN
Status: DISCONTINUED | OUTPATIENT
Start: 2025-08-04 | End: 2025-08-05 | Stop reason: HOSPADM

## 2025-08-04 RX ORDER — KETOROLAC TROMETHAMINE 30 MG/ML
15 INJECTION, SOLUTION INTRAMUSCULAR; INTRAVENOUS ONCE
Status: COMPLETED | OUTPATIENT
Start: 2025-08-04 | End: 2025-08-04

## 2025-08-04 RX ADMIN — PROCHLORPERAZINE EDISYLATE 10 MG: 5 INJECTION INTRAMUSCULAR; INTRAVENOUS at 21:57

## 2025-08-04 RX ADMIN — KETOROLAC TROMETHAMINE 15 MG: 30 INJECTION, SOLUTION INTRAMUSCULAR at 21:57

## 2025-08-04 RX ADMIN — ACYCLOVIR SODIUM 1000 MG: 1000 INJECTION, SOLUTION INTRAVENOUS at 20:52

## 2025-08-04 ASSESSMENT — PAIN - FUNCTIONAL ASSESSMENT: PAIN_FUNCTIONAL_ASSESSMENT: 0-10

## 2025-08-04 ASSESSMENT — PAIN SCALES - GENERAL: PAINLEVEL_OUTOF10: 9

## 2025-08-05 LAB
CHARACTER, CSF: CLEAR
COLOR CSF: COLORLESS
EKG ATRIAL RATE: 70 BPM
EKG P AXIS: 39 DEGREES
EKG P-R INTERVAL: 154 MS
EKG Q-T INTERVAL: 404 MS
EKG QRS DURATION: 84 MS
EKG QTC CALCULATION (BAZETT): 436 MS
EKG R AXIS: 11 DEGREES
EKG T AXIS: 28 DEGREES
EKG VENTRICULAR RATE: 70 BPM
LYMPHOCYTES NFR CSF MANUAL: 64 % (ref 0–90)
MONOS+MACROS NFR CSF MANUAL: 36 % (ref 0–45)
NUC CELL # FLD AUTO: 9 /CUMM (ref 0–5)
PATHOLOGIST REVIEW: ABNORMAL
RBC # CSF MANUAL: 1 /CUMM
TUBE VOLUME RECEIVED CSF: 12.5 ML

## 2025-08-05 PROCEDURE — 93010 ELECTROCARDIOGRAM REPORT: CPT | Performed by: INTERNAL MEDICINE

## 2025-08-09 LAB
BACTERIA BLD AEROBE CULT: NORMAL
BACTERIA BLD AEROBE CULT: NORMAL
BACTERIA CSF CULT: NORMAL
BACTERIA SPEC ANAEROBE CULT: NORMAL
GRAM STN SPEC: NORMAL

## 2025-08-12 RX ORDER — SODIUM CHLORIDE 9 MG/ML
INJECTION, SOLUTION INTRAVENOUS CONTINUOUS
Status: DISCONTINUED | OUTPATIENT
Start: 2025-08-12 | End: 2025-08-15 | Stop reason: HOSPADM

## 2025-08-12 RX ORDER — SODIUM CHLORIDE 0.9 % (FLUSH) 0.9 %
5-40 SYRINGE (ML) INJECTION PRN
Status: DISCONTINUED | OUTPATIENT
Start: 2025-08-12 | End: 2025-08-15 | Stop reason: HOSPADM

## 2025-08-12 RX ORDER — SODIUM CHLORIDE 9 MG/ML
25 INJECTION, SOLUTION INTRAVENOUS PRN
Status: DISCONTINUED | OUTPATIENT
Start: 2025-08-12 | End: 2025-08-15 | Stop reason: HOSPADM

## 2025-08-12 RX ORDER — SODIUM CHLORIDE 0.9 % (FLUSH) 0.9 %
5-40 SYRINGE (ML) INJECTION EVERY 12 HOURS SCHEDULED
Status: DISCONTINUED | OUTPATIENT
Start: 2025-08-12 | End: 2025-08-15 | Stop reason: HOSPADM

## 2025-08-15 ENCOUNTER — ANESTHESIA (OUTPATIENT)
Dept: ENDOSCOPY | Age: 50
End: 2025-08-15
Payer: COMMERCIAL

## 2025-08-15 ENCOUNTER — APPOINTMENT (OUTPATIENT)
Dept: ENDOSCOPY | Age: 50
End: 2025-08-15
Attending: INTERNAL MEDICINE
Payer: COMMERCIAL

## 2025-08-15 ENCOUNTER — HOSPITAL ENCOUNTER (OUTPATIENT)
Age: 50
Setting detail: OUTPATIENT SURGERY
Discharge: HOME | End: 2025-08-15
Attending: INTERNAL MEDICINE | Admitting: INTERNAL MEDICINE
Payer: COMMERCIAL

## 2025-08-15 ENCOUNTER — ANESTHESIA EVENT (OUTPATIENT)
Dept: ENDOSCOPY | Age: 50
End: 2025-08-15
Payer: COMMERCIAL

## 2025-08-15 VITALS
SYSTOLIC BLOOD PRESSURE: 151 MMHG | DIASTOLIC BLOOD PRESSURE: 74 MMHG | OXYGEN SATURATION: 100 % | WEIGHT: 293 LBS | RESPIRATION RATE: 16 BRPM | HEART RATE: 70 BPM | BODY MASS INDEX: 43.4 KG/M2 | HEIGHT: 69 IN | TEMPERATURE: 97.1 F

## 2025-08-15 PROCEDURE — 3700000000 HC ANESTHESIA ATTENDED CARE: Performed by: INTERNAL MEDICINE

## 2025-08-15 PROCEDURE — 7100000010 HC PHASE II RECOVERY - FIRST 15 MIN: Performed by: INTERNAL MEDICINE

## 2025-08-15 PROCEDURE — 3700000001 HC ADD 15 MINUTES (ANESTHESIA): Performed by: INTERNAL MEDICINE

## 2025-08-15 PROCEDURE — 3609010600 HC COLONOSCOPY POLYPECTOMY SNARE/COLD BIOPSY: Performed by: INTERNAL MEDICINE

## 2025-08-15 PROCEDURE — 88305 TISSUE EXAM BY PATHOLOGIST: CPT

## 2025-08-15 PROCEDURE — 7100000011 HC PHASE II RECOVERY - ADDTL 15 MIN: Performed by: INTERNAL MEDICINE

## 2025-08-15 PROCEDURE — 3609012400 HC EGD TRANSORAL BIOPSY SINGLE/MULTIPLE: Performed by: INTERNAL MEDICINE

## 2025-08-15 PROCEDURE — 6360000002 HC RX W HCPCS: Performed by: NURSE ANESTHETIST, CERTIFIED REGISTERED

## 2025-08-15 PROCEDURE — 2580000003 HC RX 258: Performed by: INTERNAL MEDICINE

## 2025-08-15 PROCEDURE — 2709999900 HC NON-CHARGEABLE SUPPLY: Performed by: INTERNAL MEDICINE

## 2025-08-15 RX ORDER — SODIUM CHLORIDE 9 MG/ML
25 INJECTION, SOLUTION INTRAVENOUS PRN
Status: CANCELLED | OUTPATIENT
Start: 2025-08-15

## 2025-08-15 RX ORDER — LIDOCAINE HYDROCHLORIDE 20 MG/ML
INJECTION, SOLUTION INFILTRATION; PERINEURAL
Status: DISCONTINUED | OUTPATIENT
Start: 2025-08-15 | End: 2025-08-15 | Stop reason: SDUPTHER

## 2025-08-15 RX ORDER — FENTANYL CITRATE 50 UG/ML
INJECTION, SOLUTION INTRAMUSCULAR; INTRAVENOUS
Status: DISCONTINUED | OUTPATIENT
Start: 2025-08-15 | End: 2025-08-15 | Stop reason: SDUPTHER

## 2025-08-15 RX ORDER — SODIUM CHLORIDE 0.9 % (FLUSH) 0.9 %
5-40 SYRINGE (ML) INJECTION PRN
Status: CANCELLED | OUTPATIENT
Start: 2025-08-15

## 2025-08-15 RX ORDER — SODIUM CHLORIDE 0.9 % (FLUSH) 0.9 %
5-40 SYRINGE (ML) INJECTION EVERY 12 HOURS SCHEDULED
Status: CANCELLED | OUTPATIENT
Start: 2025-08-15

## 2025-08-15 RX ADMIN — SODIUM CHLORIDE: 9 INJECTION, SOLUTION INTRAVENOUS at 09:50

## 2025-08-15 RX ADMIN — PROPOFOL 100 MG: 10 INJECTION, EMULSION INTRAVENOUS at 09:55

## 2025-08-15 RX ADMIN — PROPOFOL 50 MG: 10 INJECTION, EMULSION INTRAVENOUS at 10:02

## 2025-08-15 RX ADMIN — PROPOFOL 100 MG: 10 INJECTION, EMULSION INTRAVENOUS at 10:34

## 2025-08-15 RX ADMIN — PROPOFOL 100 MG: 10 INJECTION, EMULSION INTRAVENOUS at 10:09

## 2025-08-15 RX ADMIN — PROPOFOL 100 MG: 10 INJECTION, EMULSION INTRAVENOUS at 10:15

## 2025-08-15 RX ADMIN — PROPOFOL 50 MG: 10 INJECTION, EMULSION INTRAVENOUS at 10:22

## 2025-08-15 RX ADMIN — LIDOCAINE HYDROCHLORIDE 100 MG: 20 INJECTION, SOLUTION INFILTRATION; PERINEURAL at 09:55

## 2025-08-15 RX ADMIN — PROPOFOL 100 MG: 10 INJECTION, EMULSION INTRAVENOUS at 10:05

## 2025-08-15 RX ADMIN — FENTANYL CITRATE 100 MCG: 50 INJECTION, SOLUTION INTRAMUSCULAR; INTRAVENOUS at 09:53

## 2025-08-15 RX ADMIN — PROPOFOL 100 MG: 10 INJECTION, EMULSION INTRAVENOUS at 10:40

## 2025-08-15 RX ADMIN — PROPOFOL 100 MG: 10 INJECTION, EMULSION INTRAVENOUS at 09:57

## 2025-08-15 RX ADMIN — PROPOFOL 100 MG: 10 INJECTION, EMULSION INTRAVENOUS at 10:25

## 2025-08-15 ASSESSMENT — PAIN - FUNCTIONAL ASSESSMENT
PAIN_FUNCTIONAL_ASSESSMENT: 0-10
PAIN_FUNCTIONAL_ASSESSMENT: ACTIVITIES ARE NOT PREVENTED

## 2025-08-15 ASSESSMENT — PAIN DESCRIPTION - DESCRIPTORS: DESCRIPTORS: CRAMPING;PRESSURE

## (undated) DEVICE — T-MAX DISPOSABLE FACE MASK 8 PER BOX

## (undated) DEVICE — SHOULDER STABILIZATION KIT,                                    DISPOSABLE 12 PER BOX

## (undated) DEVICE — NEEDLE SPNL L3.5IN PNK HUB S STL REG WALL FIT STYL W/ QNCKE

## (undated) DEVICE — DRAPE,U/SHT,SPLIT,FILM,60X84,STERILE: Brand: MEDLINE

## (undated) DEVICE — BASIC SINGLE BASIN BTC-LF: Brand: MEDLINE INDUSTRIES, INC.

## (undated) DEVICE — BLADE SAW RESECTOR CUT 4MM

## (undated) DEVICE — TAPE SURG W4INXL11YD 2IN PERF LINERLESS NONWOVEN MEDFIX EZ

## (undated) DEVICE — APPLICATOR MEDICATED 26 CC SOLUTION HI LT ORNG CHLORAPREP

## (undated) DEVICE — 3M™ IOBAN™ 2 ANTIMICROBIAL INCISE DRAPE 6650EZ: Brand: IOBAN™ 2

## (undated) DEVICE — [STANDARD 12-FLUTE BARREL BUR, ARTHROSCOPIC SHAVER BLADE,  DO NOT RESTERILIZE,  DO NOT USE IF PACKAGE IS DAMAGED,  KEEP DRY,  KEEP AWAY FROM SUNLIGHT]: Brand: FORMULA

## (undated) DEVICE — Z INACTIVE USE 2660664 SOLUTION IRRIG 3000ML 0.9% SOD CHL USP UROMATIC PLAS CONT

## (undated) DEVICE — SUTURE PROL SZ 3-0 L18IN NONABSORBABLE BLU L30MM FS-1 3/8 8663G

## (undated) DEVICE — [THREADED CANNULA.  DO NOT RESTERILIZE,  DO NOT USE IF PACKAGE IS DAMAGED]: Brand: DRI-LOK

## (undated) DEVICE — [ARTHROSCOPY PUMP,  DO NOT USE IF PACKAGE IS DAMAGED,  KEEP DRY,  KEEP AWAY FROM SUNLIGHT,  PROTECT FROM HEAT AND RADIOACTIVE SOURCES.]: Brand: FLOSTEADY

## (undated) DEVICE — PACK PROCEDURE SURG SET UP SRMC

## (undated) DEVICE — PAD,ABDOMINAL,5"X9",ST,LF,25/BX: Brand: MEDLINE INDUSTRIES, INC.

## (undated) DEVICE — PACK-MAJOR

## (undated) DEVICE — PENCIL SMK EVAC ALL IN 1 DSGN ENH VISIBILITY IMPROVED AIR

## (undated) DEVICE — 35 ML SYRINGE LUER-LOCK TIP: Brand: MONOJECT

## (undated) DEVICE — TUBING, SUCTION, 1/4" X 20', STRAIGHT: Brand: MEDLINE INDUSTRIES, INC.

## (undated) DEVICE — 90-S CRUISE, SUCTION PROBE, NON-BENDABLE, MAX CUT LEVEL 1: Brand: SERFAS ENERGY

## (undated) DEVICE — DRESSING,GAUZE,XEROFORM,CURAD,5"X9",ST: Brand: CURAD

## (undated) DEVICE — Device

## (undated) DEVICE — GLOVE ORANGE PI 7 1/2   MSG9075

## (undated) DEVICE — INTENDED FOR TISSUE SEPARATION, AND OTHER PROCEDURES THAT REQUIRE A SHARP SURGICAL BLADE TO PUNCTURE OR CUT.: Brand: BARD-PARKER ® CARBON RIB-BACK BLADES

## (undated) DEVICE — GLOVE SURG SZ 75 L12IN THK75MIL DK GRN LTX FREE

## (undated) DEVICE — GOWN,SIRUS,NONRNF,SETINSLV,XL,20/CS: Brand: MEDLINE

## (undated) DEVICE — GLOVE ORTHO 8   MSG9480